# Patient Record
Sex: FEMALE | Race: WHITE | NOT HISPANIC OR LATINO | Employment: OTHER | URBAN - METROPOLITAN AREA
[De-identification: names, ages, dates, MRNs, and addresses within clinical notes are randomized per-mention and may not be internally consistent; named-entity substitution may affect disease eponyms.]

---

## 2017-01-08 ENCOUNTER — HOSPITAL ENCOUNTER (EMERGENCY)
Facility: HOSPITAL | Age: 42
Discharge: HOME | End: 2017-01-08
Attending: EMERGENCY MEDICINE | Admitting: EMERGENCY MEDICINE
Payer: COMMERCIAL

## 2017-01-08 VITALS
TEMPERATURE: 96.5 F | RESPIRATION RATE: 20 BRPM | SYSTOLIC BLOOD PRESSURE: 162 MMHG | DIASTOLIC BLOOD PRESSURE: 90 MMHG | WEIGHT: 197 LBS | OXYGEN SATURATION: 97 % | HEART RATE: 88 BPM | BODY MASS INDEX: 31.8 KG/M2

## 2017-01-08 DIAGNOSIS — H57.12 ACUTE LEFT EYE PAIN: Primary | ICD-10-CM

## 2017-01-08 PROCEDURE — 96372 THER/PROPH/DIAG INJ SC/IM: CPT

## 2017-01-08 PROCEDURE — 99283 EMERGENCY DEPT VISIT LOW MDM: CPT

## 2017-01-08 RX ORDER — ONDANSETRON 4 MG/1
4 TABLET, ORALLY DISINTEGRATING ORAL ONCE
Status: COMPLETED | OUTPATIENT
Start: 2017-01-08 | End: 2017-01-08

## 2017-01-08 RX ORDER — HYDROCODONE BITARTRATE AND ACETAMINOPHEN 5; 325 MG/1; MG/1
1 TABLET ORAL EVERY 6 HOURS PRN
Qty: 4 TABLET | Refills: 0 | Status: ON HOLD | OUTPATIENT
Start: 2017-01-08 | End: 2017-01-11

## 2017-01-08 RX ORDER — PHENYLEPHRINE HCL 2.5 %
1 DROPS OPHTHALMIC (EYE) ONCE
Status: COMPLETED | OUTPATIENT
Start: 2017-01-08 | End: 2017-01-08

## 2017-01-08 RX ORDER — TROPICAMIDE 10 MG/ML
1 SOLUTION/ DROPS OPHTHALMIC ONCE
Status: COMPLETED | OUTPATIENT
Start: 2017-01-08 | End: 2017-01-08

## 2017-01-08 RX ORDER — PROPARACAINE HYDROCHLORIDE 5 MG/ML
2 SOLUTION/ DROPS OPHTHALMIC ONCE
Status: COMPLETED | OUTPATIENT
Start: 2017-01-08 | End: 2017-01-08

## 2017-01-08 RX ORDER — IBUPROFEN 600 MG/1
600 TABLET ORAL ONCE
Status: COMPLETED | OUTPATIENT
Start: 2017-01-08 | End: 2017-01-08

## 2017-01-08 RX ORDER — ONDANSETRON 4 MG/1
4 TABLET, FILM COATED ORAL EVERY 8 HOURS PRN
Qty: 10 TABLET | Refills: 0 | Status: SHIPPED | OUTPATIENT
Start: 2017-01-08 | End: 2018-04-23

## 2017-01-08 RX ADMIN — HYDROMORPHONE HYDROCHLORIDE 1 MG: 1 INJECTION, SOLUTION INTRAMUSCULAR; INTRAVENOUS; SUBCUTANEOUS at 20:58

## 2017-01-08 RX ADMIN — PROPARACAINE HYDROCHLORIDE 2 DROP: 5 SOLUTION/ DROPS OPHTHALMIC at 20:40

## 2017-01-08 RX ADMIN — ONDANSETRON 4 MG: 4 TABLET, ORALLY DISINTEGRATING ORAL at 20:56

## 2017-01-08 RX ADMIN — TROPICAMIDE 1 DROP: 10 SOLUTION/ DROPS OPHTHALMIC at 22:06

## 2017-01-08 RX ADMIN — ONDANSETRON 4 MG: 4 TABLET, ORALLY DISINTEGRATING ORAL at 22:23

## 2017-01-08 RX ADMIN — IBUPROFEN 600 MG: 600 TABLET ORAL at 21:29

## 2017-01-08 RX ADMIN — PHENYLEPHRINE HYDROCHLORIDE 1 DROP: 25 SOLUTION/ DROPS OPHTHALMIC at 22:07

## 2017-01-08 RX ADMIN — FLUORESCEIN SODIUM 1 STRIP: 1 STRIP OPHTHALMIC at 20:40

## 2017-01-09 ENCOUNTER — HOSPITAL ENCOUNTER (INPATIENT)
Facility: HOSPITAL | Age: 42
LOS: 2 days | Discharge: HOME | DRG: 123 | End: 2017-01-11
Attending: EMERGENCY MEDICINE | Admitting: INTERNAL MEDICINE
Payer: COMMERCIAL

## 2017-01-09 DIAGNOSIS — H46.9 OPTIC NEURITIS, LEFT: Primary | ICD-10-CM

## 2017-01-09 DIAGNOSIS — Z09 FOLLOW UP: ICD-10-CM

## 2017-01-09 DIAGNOSIS — G90.521 COMPLEX REGIONAL PAIN SYNDROME OF RIGHT LOWER EXTREMITY: ICD-10-CM

## 2017-01-09 PROBLEM — J45.909 UNCOMPLICATED ASTHMA: Status: ACTIVE | Noted: 2017-01-09

## 2017-01-09 LAB
ANION GAP SERPL CALCULATED.3IONS-SCNC: 9 MMOL/L (ref 4–13)
BASOPHILS # BLD AUTO: 0.02 THOUSANDS/ΜL (ref 0–0.1)
BASOPHILS NFR BLD AUTO: 0 % (ref 0–1)
BUN SERPL-MCNC: 10 MG/DL (ref 5–25)
CALCIUM SERPL-MCNC: 9 MG/DL (ref 8.3–10.1)
CHLORIDE SERPL-SCNC: 104 MMOL/L (ref 100–108)
CO2 SERPL-SCNC: 27 MMOL/L (ref 21–32)
CREAT SERPL-MCNC: 1.04 MG/DL (ref 0.6–1.3)
EOSINOPHIL # BLD AUTO: 0.01 THOUSAND/ΜL (ref 0–0.61)
EOSINOPHIL NFR BLD AUTO: 0 % (ref 0–6)
ERYTHROCYTE [DISTWIDTH] IN BLOOD BY AUTOMATED COUNT: 13.6 % (ref 11.6–15.1)
GFR SERPL CREATININE-BSD FRML MDRD: 58.4 ML/MIN/1.73SQ M
GLUCOSE SERPL-MCNC: 100 MG/DL (ref 65–140)
HCT VFR BLD AUTO: 43 % (ref 34.8–46.1)
HGB BLD-MCNC: 13.9 G/DL (ref 11.5–15.4)
LYMPHOCYTES # BLD AUTO: 1.27 THOUSANDS/ΜL (ref 0.6–4.47)
LYMPHOCYTES NFR BLD AUTO: 17 % (ref 14–44)
MCH RBC QN AUTO: 27.7 PG (ref 26.8–34.3)
MCHC RBC AUTO-ENTMCNC: 32.3 G/DL (ref 31.4–37.4)
MCV RBC AUTO: 86 FL (ref 82–98)
MONOCYTES # BLD AUTO: 0.34 THOUSAND/ΜL (ref 0.17–1.22)
MONOCYTES NFR BLD AUTO: 5 % (ref 4–12)
NEUTROPHILS # BLD AUTO: 5.69 THOUSANDS/ΜL (ref 1.85–7.62)
NEUTS SEG NFR BLD AUTO: 78 % (ref 43–75)
PLATELET # BLD AUTO: 277 THOUSANDS/UL (ref 149–390)
PLATELET # BLD AUTO: 285 THOUSANDS/UL (ref 149–390)
PMV BLD AUTO: 10.9 FL (ref 8.9–12.7)
PMV BLD AUTO: 11 FL (ref 8.9–12.7)
POTASSIUM SERPL-SCNC: 5.2 MMOL/L (ref 3.5–5.3)
RBC # BLD AUTO: 5.01 MILLION/UL (ref 3.81–5.12)
SODIUM SERPL-SCNC: 140 MMOL/L (ref 136–145)
WBC # BLD AUTO: 7.33 THOUSAND/UL (ref 4.31–10.16)

## 2017-01-09 PROCEDURE — 36415 COLL VENOUS BLD VENIPUNCTURE: CPT | Performed by: EMERGENCY MEDICINE

## 2017-01-09 PROCEDURE — 85049 AUTOMATED PLATELET COUNT: CPT | Performed by: INTERNAL MEDICINE

## 2017-01-09 PROCEDURE — 85025 COMPLETE CBC W/AUTO DIFF WBC: CPT | Performed by: EMERGENCY MEDICINE

## 2017-01-09 PROCEDURE — 99284 EMERGENCY DEPT VISIT MOD MDM: CPT

## 2017-01-09 PROCEDURE — 80048 BASIC METABOLIC PNL TOTAL CA: CPT | Performed by: EMERGENCY MEDICINE

## 2017-01-09 RX ORDER — ONDANSETRON 2 MG/ML
4 INJECTION INTRAMUSCULAR; INTRAVENOUS EVERY 6 HOURS PRN
Status: DISCONTINUED | OUTPATIENT
Start: 2017-01-09 | End: 2017-01-11 | Stop reason: HOSPADM

## 2017-01-09 RX ORDER — HYDROCODONE BITARTRATE AND ACETAMINOPHEN 5; 325 MG/1; MG/1
1 TABLET ORAL ONCE
Status: COMPLETED | OUTPATIENT
Start: 2017-01-09 | End: 2017-01-09

## 2017-01-09 RX ORDER — TIZANIDINE 2 MG/1
4 TABLET ORAL 3 TIMES DAILY
Status: DISCONTINUED | OUTPATIENT
Start: 2017-01-09 | End: 2017-01-11 | Stop reason: HOSPADM

## 2017-01-09 RX ORDER — ALBUTEROL SULFATE 90 UG/1
2 AEROSOL, METERED RESPIRATORY (INHALATION) AS NEEDED
Status: DISCONTINUED | OUTPATIENT
Start: 2017-01-09 | End: 2017-01-11 | Stop reason: HOSPADM

## 2017-01-09 RX ORDER — ACETAMINOPHEN 325 MG/1
650 TABLET ORAL EVERY 6 HOURS PRN
Status: DISCONTINUED | OUTPATIENT
Start: 2017-01-09 | End: 2017-01-11 | Stop reason: HOSPADM

## 2017-01-09 RX ORDER — HYDROCODONE BITARTRATE AND ACETAMINOPHEN 5; 325 MG/1; MG/1
1 TABLET ORAL EVERY 6 HOURS PRN
Status: DISCONTINUED | OUTPATIENT
Start: 2017-01-09 | End: 2017-01-11 | Stop reason: HOSPADM

## 2017-01-09 RX ORDER — GABAPENTIN 300 MG/1
600 CAPSULE ORAL 3 TIMES DAILY
Status: DISCONTINUED | OUTPATIENT
Start: 2017-01-09 | End: 2017-01-11 | Stop reason: HOSPADM

## 2017-01-09 RX ORDER — PANTOPRAZOLE SODIUM 40 MG/1
40 TABLET, DELAYED RELEASE ORAL
Status: DISCONTINUED | OUTPATIENT
Start: 2017-01-10 | End: 2017-01-11 | Stop reason: HOSPADM

## 2017-01-09 RX ORDER — TRAMADOL HYDROCHLORIDE 50 MG/1
50 TABLET ORAL EVERY 6 HOURS PRN
Status: DISCONTINUED | OUTPATIENT
Start: 2017-01-09 | End: 2017-01-11 | Stop reason: HOSPADM

## 2017-01-09 RX ADMIN — HYDROMORPHONE HYDROCHLORIDE 0.5 MG: 1 INJECTION, SOLUTION INTRAMUSCULAR; INTRAVENOUS; SUBCUTANEOUS at 20:06

## 2017-01-09 RX ADMIN — GABAPENTIN 600 MG: 300 CAPSULE ORAL at 22:32

## 2017-01-09 RX ADMIN — HYDROCODONE BITARTRATE AND ACETAMINOPHEN 1 TABLET: 5; 325 TABLET ORAL at 11:28

## 2017-01-09 RX ADMIN — TRAMADOL HYDROCHLORIDE 50 MG: 50 TABLET, COATED ORAL at 18:29

## 2017-01-09 RX ADMIN — GABAPENTIN 600 MG: 300 CAPSULE ORAL at 15:24

## 2017-01-09 RX ADMIN — METHYLPREDNISOLONE SODIUM SUCCINATE 1000 MG: 1 INJECTION, POWDER, LYOPHILIZED, FOR SOLUTION INTRAMUSCULAR; INTRAVENOUS at 13:17

## 2017-01-09 RX ADMIN — TIZANIDINE 4 MG: 2 TABLET ORAL at 22:32

## 2017-01-09 RX ADMIN — ONDANSETRON 4 MG: 2 INJECTION INTRAMUSCULAR; INTRAVENOUS at 18:34

## 2017-01-09 RX ADMIN — HYDROCODONE BITARTATE AND ACETAMINOPHEN 1 TABLET: 5; 325 TABLET ORAL at 15:25

## 2017-01-09 RX ADMIN — TIZANIDINE 4 MG: 2 TABLET ORAL at 15:24

## 2017-01-10 LAB
ALBUMIN SERPL BCP-MCNC: 3.7 G/DL (ref 3.5–5)
ALP SERPL-CCNC: 87 U/L (ref 46–116)
ALT SERPL W P-5'-P-CCNC: 21 U/L (ref 12–78)
ANION GAP SERPL CALCULATED.3IONS-SCNC: 12 MMOL/L (ref 4–13)
AST SERPL W P-5'-P-CCNC: 12 U/L (ref 5–45)
BILIRUB SERPL-MCNC: 0.3 MG/DL (ref 0.2–1)
BUN SERPL-MCNC: 10 MG/DL (ref 5–25)
CALCIUM SERPL-MCNC: 8.9 MG/DL (ref 8.3–10.1)
CHLORIDE SERPL-SCNC: 105 MMOL/L (ref 100–108)
CO2 SERPL-SCNC: 23 MMOL/L (ref 21–32)
CREAT SERPL-MCNC: 1 MG/DL (ref 0.6–1.3)
ERYTHROCYTE [DISTWIDTH] IN BLOOD BY AUTOMATED COUNT: 13.2 % (ref 11.6–15.1)
GFR SERPL CREATININE-BSD FRML MDRD: >60 ML/MIN/1.73SQ M
GLUCOSE SERPL-MCNC: 163 MG/DL (ref 65–140)
HCT VFR BLD AUTO: 40.6 % (ref 34.8–46.1)
HGB BLD-MCNC: 13 G/DL (ref 11.5–15.4)
MCH RBC QN AUTO: 27 PG (ref 26.8–34.3)
MCHC RBC AUTO-ENTMCNC: 32 G/DL (ref 31.4–37.4)
MCV RBC AUTO: 84 FL (ref 82–98)
PLATELET # BLD AUTO: 288 THOUSANDS/UL (ref 149–390)
PMV BLD AUTO: 11.3 FL (ref 8.9–12.7)
POTASSIUM SERPL-SCNC: 3.9 MMOL/L (ref 3.5–5.3)
PROT SERPL-MCNC: 7.6 G/DL (ref 6.4–8.2)
RBC # BLD AUTO: 4.81 MILLION/UL (ref 3.81–5.12)
SODIUM SERPL-SCNC: 140 MMOL/L (ref 136–145)
WBC # BLD AUTO: 8.5 THOUSAND/UL (ref 4.31–10.16)

## 2017-01-10 PROCEDURE — 80053 COMPREHEN METABOLIC PANEL: CPT | Performed by: INTERNAL MEDICINE

## 2017-01-10 PROCEDURE — 85027 COMPLETE CBC AUTOMATED: CPT | Performed by: INTERNAL MEDICINE

## 2017-01-10 RX ADMIN — ONDANSETRON 4 MG: 2 INJECTION INTRAMUSCULAR; INTRAVENOUS at 08:52

## 2017-01-10 RX ADMIN — PANTOPRAZOLE SODIUM 40 MG: 40 TABLET, DELAYED RELEASE ORAL at 05:11

## 2017-01-10 RX ADMIN — SODIUM CHLORIDE 1000 MG: 0.9 INJECTION, SOLUTION INTRAVENOUS at 12:30

## 2017-01-10 RX ADMIN — TIZANIDINE 4 MG: 2 TABLET ORAL at 15:46

## 2017-01-10 RX ADMIN — ACETAMINOPHEN 650 MG: 325 TABLET ORAL at 17:20

## 2017-01-10 RX ADMIN — TIZANIDINE 4 MG: 2 TABLET ORAL at 08:52

## 2017-01-10 RX ADMIN — HYDROCODONE BITARTATE AND ACETAMINOPHEN 1 TABLET: 5; 325 TABLET ORAL at 15:27

## 2017-01-10 RX ADMIN — ACETAMINOPHEN 650 MG: 325 TABLET ORAL at 08:51

## 2017-01-10 RX ADMIN — HYDROCODONE BITARTATE AND ACETAMINOPHEN 1 TABLET: 5; 325 TABLET ORAL at 21:12

## 2017-01-10 RX ADMIN — GABAPENTIN 600 MG: 300 CAPSULE ORAL at 21:12

## 2017-01-10 RX ADMIN — TIZANIDINE 4 MG: 2 TABLET ORAL at 21:12

## 2017-01-10 RX ADMIN — HYDROCODONE BITARTATE AND ACETAMINOPHEN 1 TABLET: 5; 325 TABLET ORAL at 05:15

## 2017-01-10 RX ADMIN — GABAPENTIN 600 MG: 300 CAPSULE ORAL at 15:45

## 2017-01-10 RX ADMIN — GABAPENTIN 600 MG: 300 CAPSULE ORAL at 08:52

## 2017-01-10 RX ADMIN — ONDANSETRON 4 MG: 2 INJECTION INTRAMUSCULAR; INTRAVENOUS at 21:12

## 2017-01-11 ENCOUNTER — APPOINTMENT (INPATIENT)
Dept: RADIOLOGY | Facility: HOSPITAL | Age: 42
DRG: 123 | End: 2017-01-11
Payer: COMMERCIAL

## 2017-01-11 ENCOUNTER — APPOINTMENT (INPATIENT)
Dept: MRI IMAGING | Facility: HOSPITAL | Age: 42
DRG: 123 | End: 2017-01-11
Payer: COMMERCIAL

## 2017-01-11 VITALS
RESPIRATION RATE: 18 BRPM | HEART RATE: 93 BPM | BODY MASS INDEX: 30.29 KG/M2 | OXYGEN SATURATION: 100 % | TEMPERATURE: 97.9 F | DIASTOLIC BLOOD PRESSURE: 58 MMHG | HEIGHT: 66 IN | SYSTOLIC BLOOD PRESSURE: 109 MMHG | WEIGHT: 188.49 LBS

## 2017-01-11 LAB — ERYTHROCYTE [SEDIMENTATION RATE] IN BLOOD: 10 MM/HOUR (ref 0–20)

## 2017-01-11 PROCEDURE — 71010 HB CHEST X-RAY 1 VIEW FRONTAL: CPT

## 2017-01-11 PROCEDURE — 86618 LYME DISEASE ANTIBODY: CPT | Performed by: PSYCHIATRY & NEUROLOGY

## 2017-01-11 PROCEDURE — 74000 HB X-RAY EXAM OF ABDOMEN (SINGLE ANTEROPOSTERIOR VIEW): CPT

## 2017-01-11 PROCEDURE — 86617 LYME DISEASE ANTIBODY: CPT | Performed by: PSYCHIATRY & NEUROLOGY

## 2017-01-11 PROCEDURE — 82164 ANGIOTENSIN I ENZYME TEST: CPT | Performed by: PSYCHIATRY & NEUROLOGY

## 2017-01-11 PROCEDURE — 85652 RBC SED RATE AUTOMATED: CPT | Performed by: INTERNAL MEDICINE

## 2017-01-11 PROCEDURE — 70553 MRI BRAIN STEM W/O & W/DYE: CPT

## 2017-01-11 PROCEDURE — 83520 IMMUNOASSAY QUANT NOS NONAB: CPT | Performed by: PSYCHIATRY & NEUROLOGY

## 2017-01-11 PROCEDURE — 70543 MRI ORBT/FAC/NCK W/O &W/DYE: CPT

## 2017-01-11 PROCEDURE — 86592 SYPHILIS TEST NON-TREP QUAL: CPT | Performed by: PSYCHIATRY & NEUROLOGY

## 2017-01-11 PROCEDURE — A9585 GADOBUTROL INJECTION: HCPCS | Performed by: INTERNAL MEDICINE

## 2017-01-11 RX ORDER — HYDROCODONE BITARTRATE AND ACETAMINOPHEN 5; 325 MG/1; MG/1
1 TABLET ORAL EVERY 6 HOURS PRN
Qty: 8 TABLET | Refills: 0 | Status: SHIPPED | OUTPATIENT
Start: 2017-01-11 | End: 2017-01-14

## 2017-01-11 RX ORDER — LORAZEPAM 2 MG/ML
0.5 INJECTION INTRAMUSCULAR EVERY 6 HOURS PRN
Status: DISCONTINUED | OUTPATIENT
Start: 2017-01-11 | End: 2017-01-11 | Stop reason: HOSPADM

## 2017-01-11 RX ORDER — METHYLPREDNISOLONE 4 MG/1
6 TABLET ORAL
Status: DISCONTINUED | OUTPATIENT
Start: 2017-01-20 | End: 2017-01-11 | Stop reason: HOSPADM

## 2017-01-11 RX ORDER — PREDNISONE 10 MG/1
TABLET ORAL
Qty: 33 TABLET | Refills: 0 | Status: SHIPPED | OUTPATIENT
Start: 2017-01-11 | End: 2017-03-18

## 2017-01-11 RX ORDER — METHYLPREDNISOLONE 4 MG/1
10 TABLET ORAL
Status: DISCONTINUED | OUTPATIENT
Start: 2017-01-19 | End: 2017-01-11 | Stop reason: HOSPADM

## 2017-01-11 RX ORDER — OMEPRAZOLE 20 MG/1
20 CAPSULE, DELAYED RELEASE ORAL DAILY
Qty: 30 CAPSULE | Refills: 0 | Status: SHIPPED | OUTPATIENT
Start: 2017-01-11 | End: 2018-04-23

## 2017-01-11 RX ORDER — GABAPENTIN 600 MG/1
600 TABLET ORAL
Qty: 150 TABLET | Refills: 0 | Status: SHIPPED | OUTPATIENT
Start: 2017-01-11 | End: 2017-07-18 | Stop reason: HOSPADM

## 2017-01-11 RX ORDER — TIZANIDINE HYDROCHLORIDE 4 MG/1
4 CAPSULE, GELATIN COATED ORAL 3 TIMES DAILY
Qty: 90 CAPSULE | Refills: 0 | Status: SHIPPED | OUTPATIENT
Start: 2017-01-11 | End: 2018-04-23

## 2017-01-11 RX ADMIN — TIZANIDINE 4 MG: 2 TABLET ORAL at 08:25

## 2017-01-11 RX ADMIN — TIZANIDINE 4 MG: 2 TABLET ORAL at 15:37

## 2017-01-11 RX ADMIN — SODIUM CHLORIDE 1000 MG: 0.9 INJECTION, SOLUTION INTRAVENOUS at 08:25

## 2017-01-11 RX ADMIN — HYDROCODONE BITARTATE AND ACETAMINOPHEN 1 TABLET: 5; 325 TABLET ORAL at 08:25

## 2017-01-11 RX ADMIN — LORAZEPAM 0.5 MG: 2 INJECTION INTRAMUSCULAR; INTRAVENOUS at 11:49

## 2017-01-11 RX ADMIN — PANTOPRAZOLE SODIUM 40 MG: 40 TABLET, DELAYED RELEASE ORAL at 05:46

## 2017-01-11 RX ADMIN — GABAPENTIN 600 MG: 300 CAPSULE ORAL at 15:37

## 2017-01-11 RX ADMIN — ONDANSETRON 4 MG: 2 INJECTION INTRAMUSCULAR; INTRAVENOUS at 04:40

## 2017-01-11 RX ADMIN — TRAMADOL HYDROCHLORIDE 50 MG: 50 TABLET, COATED ORAL at 01:06

## 2017-01-11 RX ADMIN — GABAPENTIN 600 MG: 300 CAPSULE ORAL at 08:25

## 2017-01-11 RX ADMIN — GADOBUTROL 8 ML: 604.72 INJECTION INTRAVENOUS at 12:55

## 2017-01-12 LAB
ACE SERPL-CCNC: 20 U/L (ref 14–82)
B BURGDOR IGG SER IA-ACNC: 0.17
B BURGDOR IGM SER IA-ACNC: 1.16
RPR SER QL: NORMAL

## 2017-01-13 LAB
AQP4 H2O CHANNEL AB SERPL IA-ACNC: <1.5 U/ML (ref 0–3)
B BURGDOR IGG PATRN SER IB-IMP: NEGATIVE
B BURGDOR IGM PATRN SER IB-IMP: NEGATIVE
B BURGDOR18KD IGG SER QL IB: NORMAL
B BURGDOR23KD IGG SER QL IB: NORMAL
B BURGDOR23KD IGM SER QL IB: NORMAL
B BURGDOR28KD IGG SER QL IB: NORMAL
B BURGDOR30KD IGG SER QL IB: NORMAL
B BURGDOR39KD IGG SER QL IB: NORMAL
B BURGDOR39KD IGM SER QL IB: NORMAL
B BURGDOR41KD IGG SER QL IB: NORMAL
B BURGDOR41KD IGM SER QL IB: NORMAL
B BURGDOR45KD IGG SER QL IB: NORMAL
B BURGDOR58KD IGG SER QL IB: NORMAL
B BURGDOR66KD IGG SER QL IB: NORMAL
B BURGDOR93KD IGG SER QL IB: NORMAL

## 2017-01-19 ENCOUNTER — APPOINTMENT (EMERGENCY)
Dept: RADIOLOGY | Facility: HOSPITAL | Age: 42
End: 2017-01-19
Payer: COMMERCIAL

## 2017-01-19 ENCOUNTER — HOSPITAL ENCOUNTER (EMERGENCY)
Facility: HOSPITAL | Age: 42
Discharge: HOME | End: 2017-01-19
Attending: EMERGENCY MEDICINE
Payer: COMMERCIAL

## 2017-01-19 VITALS
DIASTOLIC BLOOD PRESSURE: 77 MMHG | HEIGHT: 66 IN | WEIGHT: 197 LBS | TEMPERATURE: 98.6 F | HEART RATE: 83 BPM | OXYGEN SATURATION: 97 % | RESPIRATION RATE: 18 BRPM | SYSTOLIC BLOOD PRESSURE: 135 MMHG | BODY MASS INDEX: 31.66 KG/M2

## 2017-01-19 DIAGNOSIS — G90.521 COMPLEX REGIONAL PAIN SYNDROME OF RIGHT LOWER EXTREMITY: ICD-10-CM

## 2017-01-19 DIAGNOSIS — R55 SYNCOPE: ICD-10-CM

## 2017-01-19 DIAGNOSIS — M79.661 PAIN IN RIGHT LOWER LEG: Primary | ICD-10-CM

## 2017-01-19 LAB
ATRIAL RATE: 77 BPM
P AXIS: 71 DEGREES
PR INTERVAL: 150 MS
QRS AXIS: 79 DEGREES
QRSD INTERVAL: 74 MS
QT INTERVAL: 362 MS
QTC INTERVAL: 409 MS
T WAVE AXIS: 70 DEGREES
VENTRICULAR RATE: 77 BPM

## 2017-01-19 PROCEDURE — 93005 ELECTROCARDIOGRAM TRACING: CPT | Performed by: EMERGENCY MEDICINE

## 2017-01-19 PROCEDURE — 93971 EXTREMITY STUDY: CPT

## 2017-01-19 PROCEDURE — 99284 EMERGENCY DEPT VISIT MOD MDM: CPT

## 2017-01-19 PROCEDURE — 73590 X-RAY EXAM OF LOWER LEG: CPT

## 2017-03-18 ENCOUNTER — APPOINTMENT (EMERGENCY)
Dept: RADIOLOGY | Facility: HOSPITAL | Age: 42
End: 2017-03-18
Payer: COMMERCIAL

## 2017-03-18 ENCOUNTER — HOSPITAL ENCOUNTER (EMERGENCY)
Facility: HOSPITAL | Age: 42
Discharge: HOME | End: 2017-03-18
Attending: EMERGENCY MEDICINE | Admitting: EMERGENCY MEDICINE
Payer: COMMERCIAL

## 2017-03-18 VITALS
OXYGEN SATURATION: 96 % | WEIGHT: 197 LBS | BODY MASS INDEX: 31.8 KG/M2 | HEART RATE: 74 BPM | TEMPERATURE: 97 F | DIASTOLIC BLOOD PRESSURE: 59 MMHG | SYSTOLIC BLOOD PRESSURE: 96 MMHG | RESPIRATION RATE: 18 BRPM

## 2017-03-18 DIAGNOSIS — G90.511 COMPLEX REGIONAL PAIN SYNDROME I OF RIGHT UPPER LIMB: Primary | ICD-10-CM

## 2017-03-18 PROCEDURE — 96374 THER/PROPH/DIAG INJ IV PUSH: CPT

## 2017-03-18 PROCEDURE — 96375 TX/PRO/DX INJ NEW DRUG ADDON: CPT

## 2017-03-18 PROCEDURE — 73030 X-RAY EXAM OF SHOULDER: CPT

## 2017-03-18 PROCEDURE — 99283 EMERGENCY DEPT VISIT LOW MDM: CPT

## 2017-03-18 RX ORDER — HYDROCODONE BITARTRATE AND ACETAMINOPHEN 5; 325 MG/1; MG/1
1 TABLET ORAL 2 TIMES DAILY
COMMUNITY
End: 2017-06-01

## 2017-03-18 RX ORDER — METOCLOPRAMIDE HYDROCHLORIDE 5 MG/ML
10 INJECTION INTRAMUSCULAR; INTRAVENOUS ONCE
Status: COMPLETED | OUTPATIENT
Start: 2017-03-18 | End: 2017-03-18

## 2017-03-18 RX ORDER — MORPHINE SULFATE 15 MG/1
7.5 TABLET ORAL EVERY 6 HOURS PRN
Qty: 3 TABLET | Refills: 0 | Status: SHIPPED | OUTPATIENT
Start: 2017-03-18 | End: 2017-06-01

## 2017-03-18 RX ORDER — LIDOCAINE 50 MG/G
1 PATCH TOPICAL ONCE
Status: DISCONTINUED | OUTPATIENT
Start: 2017-03-18 | End: 2017-03-18 | Stop reason: HOSPADM

## 2017-03-18 RX ORDER — KETOROLAC TROMETHAMINE 30 MG/ML
15 INJECTION, SOLUTION INTRAMUSCULAR; INTRAVENOUS ONCE
Status: COMPLETED | OUTPATIENT
Start: 2017-03-18 | End: 2017-03-18

## 2017-03-18 RX ORDER — LIDOCAINE 50 MG/G
1 PATCH TOPICAL EVERY 24 HOURS
Qty: 30 PATCH | Refills: 0 | Status: SHIPPED | OUTPATIENT
Start: 2017-03-18 | End: 2018-04-23

## 2017-03-18 RX ORDER — DICLOFENAC POTASSIUM 50 MG/1
50 TABLET, FILM COATED ORAL 2 TIMES DAILY
COMMUNITY
End: 2017-06-01

## 2017-03-18 RX ADMIN — LIDOCAINE 1 PATCH: 50 PATCH CUTANEOUS at 14:57

## 2017-03-18 RX ADMIN — METOCLOPRAMIDE 10 MG: 5 INJECTION, SOLUTION INTRAMUSCULAR; INTRAVENOUS at 16:38

## 2017-03-18 RX ADMIN — KETOROLAC TROMETHAMINE 15 MG: 30 INJECTION, SOLUTION INTRAMUSCULAR at 14:57

## 2017-03-18 RX ADMIN — HYDROMORPHONE HYDROCHLORIDE 1 MG: 1 INJECTION, SOLUTION INTRAMUSCULAR; INTRAVENOUS; SUBCUTANEOUS at 15:45

## 2017-04-13 ENCOUNTER — CONVERSION ENCOUNTER (OUTPATIENT)
Dept: RADIOLOGY | Facility: IMAGING CENTER | Age: 42
End: 2017-04-13

## 2017-04-26 ENCOUNTER — APPOINTMENT (OUTPATIENT)
Dept: OCCUPATIONAL THERAPY | Facility: HOSPITAL | Age: 42
End: 2017-04-26
Payer: MEDICARE

## 2017-04-26 ENCOUNTER — ALLSCRIPTS OFFICE VISIT (OUTPATIENT)
Dept: OTHER | Facility: OTHER | Age: 42
End: 2017-04-26

## 2017-04-26 DIAGNOSIS — M65.341 TRIGGER RING FINGER OF RIGHT HAND: ICD-10-CM

## 2017-04-26 PROCEDURE — L3806 WHFO W/JOINT(S) CUSTOM FAB: HCPCS

## 2017-04-26 PROCEDURE — G8990 OTHER PT/OT CURRENT STATUS: HCPCS

## 2017-04-26 PROCEDURE — G8991 OTHER PT/OT GOAL STATUS: HCPCS

## 2017-05-19 ENCOUNTER — GENERIC CONVERSION - ENCOUNTER (OUTPATIENT)
Dept: OTHER | Facility: OTHER | Age: 42
End: 2017-05-19

## 2017-05-29 ENCOUNTER — APPOINTMENT (EMERGENCY)
Dept: RADIOLOGY | Facility: HOSPITAL | Age: 42
End: 2017-05-29

## 2017-05-29 ENCOUNTER — HOSPITAL ENCOUNTER (EMERGENCY)
Facility: HOSPITAL | Age: 42
Discharge: HOME | End: 2017-05-29
Attending: EMERGENCY MEDICINE | Admitting: EMERGENCY MEDICINE

## 2017-05-29 VITALS
SYSTOLIC BLOOD PRESSURE: 103 MMHG | HEART RATE: 87 BPM | TEMPERATURE: 98.4 F | RESPIRATION RATE: 16 BRPM | WEIGHT: 197 LBS | BODY MASS INDEX: 32.82 KG/M2 | HEIGHT: 65 IN | OXYGEN SATURATION: 98 % | DIASTOLIC BLOOD PRESSURE: 62 MMHG

## 2017-05-29 DIAGNOSIS — R10.9 FLANK PAIN: Primary | ICD-10-CM

## 2017-05-29 LAB
ANION GAP SERPL CALCULATED.3IONS-SCNC: 8 MMOL/L (ref 4–13)
BASOPHILS # BLD AUTO: 0.04 THOUSANDS/ΜL (ref 0–0.1)
BASOPHILS NFR BLD AUTO: 0 % (ref 0–1)
BUN SERPL-MCNC: 10 MG/DL (ref 5–25)
CALCIUM SERPL-MCNC: 8.6 MG/DL (ref 8.3–10.1)
CHLORIDE SERPL-SCNC: 111 MMOL/L (ref 100–108)
CO2 SERPL-SCNC: 23 MMOL/L (ref 21–32)
CREAT SERPL-MCNC: 0.94 MG/DL (ref 0.6–1.3)
EOSINOPHIL # BLD AUTO: 0.02 THOUSAND/ΜL (ref 0–0.61)
EOSINOPHIL NFR BLD AUTO: 0 % (ref 0–6)
ERYTHROCYTE [DISTWIDTH] IN BLOOD BY AUTOMATED COUNT: 13.6 % (ref 11.6–15.1)
GFR SERPL CREATININE-BSD FRML MDRD: >60 ML/MIN/1.73SQ M
GLUCOSE SERPL-MCNC: 111 MG/DL (ref 65–140)
HCT VFR BLD AUTO: 37.8 % (ref 34.8–46.1)
HGB BLD-MCNC: 12.4 G/DL (ref 11.5–15.4)
LYMPHOCYTES # BLD AUTO: 1.84 THOUSANDS/ΜL (ref 0.6–4.47)
LYMPHOCYTES NFR BLD AUTO: 19 % (ref 14–44)
MCH RBC QN AUTO: 28.2 PG (ref 26.8–34.3)
MCHC RBC AUTO-ENTMCNC: 32.8 G/DL (ref 31.4–37.4)
MCV RBC AUTO: 86 FL (ref 82–98)
MONOCYTES # BLD AUTO: 0.62 THOUSAND/ΜL (ref 0.17–1.22)
MONOCYTES NFR BLD AUTO: 6 % (ref 4–12)
NEUTROPHILS # BLD AUTO: 7.23 THOUSANDS/ΜL (ref 1.85–7.62)
NEUTS SEG NFR BLD AUTO: 75 % (ref 43–75)
NRBC BLD AUTO-RTO: 0 /100 WBCS
PLATELET # BLD AUTO: 279 THOUSANDS/UL (ref 149–390)
PMV BLD AUTO: 10.3 FL (ref 8.9–12.7)
POTASSIUM SERPL-SCNC: 3.9 MMOL/L (ref 3.5–5.3)
RBC # BLD AUTO: 4.4 MILLION/UL (ref 3.81–5.12)
SODIUM SERPL-SCNC: 142 MMOL/L (ref 136–145)
WBC # BLD AUTO: 9.8 THOUSAND/UL (ref 4.31–10.16)

## 2017-05-29 PROCEDURE — 85025 COMPLETE CBC W/AUTO DIFF WBC: CPT | Performed by: EMERGENCY MEDICINE

## 2017-05-29 PROCEDURE — 99284 EMERGENCY DEPT VISIT MOD MDM: CPT

## 2017-05-29 PROCEDURE — 74176 CT ABD & PELVIS W/O CONTRAST: CPT

## 2017-05-29 PROCEDURE — 96374 THER/PROPH/DIAG INJ IV PUSH: CPT

## 2017-05-29 PROCEDURE — 80048 BASIC METABOLIC PNL TOTAL CA: CPT | Performed by: EMERGENCY MEDICINE

## 2017-05-29 PROCEDURE — 96375 TX/PRO/DX INJ NEW DRUG ADDON: CPT

## 2017-05-29 PROCEDURE — 96361 HYDRATE IV INFUSION ADD-ON: CPT

## 2017-05-29 PROCEDURE — 36415 COLL VENOUS BLD VENIPUNCTURE: CPT | Performed by: EMERGENCY MEDICINE

## 2017-05-29 RX ORDER — KETOROLAC TROMETHAMINE 30 MG/ML
15 INJECTION, SOLUTION INTRAMUSCULAR; INTRAVENOUS ONCE
Status: COMPLETED | OUTPATIENT
Start: 2017-05-29 | End: 2017-05-29

## 2017-05-29 RX ORDER — NAPROXEN 500 MG/1
500 TABLET ORAL 2 TIMES DAILY WITH MEALS
Qty: 14 TABLET | Refills: 0 | Status: SHIPPED | OUTPATIENT
Start: 2017-05-29 | End: 2017-06-01

## 2017-05-29 RX ADMIN — HYDROMORPHONE HYDROCHLORIDE 0.5 MG: 1 INJECTION, SOLUTION INTRAMUSCULAR; INTRAVENOUS; SUBCUTANEOUS at 18:41

## 2017-05-29 RX ADMIN — KETOROLAC TROMETHAMINE 15 MG: 30 INJECTION, SOLUTION INTRAMUSCULAR at 18:41

## 2017-05-29 RX ADMIN — SODIUM CHLORIDE 1000 ML: 0.9 INJECTION, SOLUTION INTRAVENOUS at 18:38

## 2017-06-22 ENCOUNTER — GENERIC CONVERSION - ENCOUNTER (OUTPATIENT)
Dept: OTHER | Facility: OTHER | Age: 42
End: 2017-06-22

## 2017-06-28 ENCOUNTER — ALLSCRIPTS OFFICE VISIT (OUTPATIENT)
Dept: OTHER | Facility: OTHER | Age: 42
End: 2017-06-28

## 2017-07-11 ENCOUNTER — GENERIC CONVERSION - ENCOUNTER (OUTPATIENT)
Dept: OTHER | Facility: OTHER | Age: 42
End: 2017-07-11

## 2017-07-11 RX ORDER — DULOXETIN HYDROCHLORIDE 30 MG/1
30 CAPSULE, DELAYED RELEASE ORAL 2 TIMES DAILY
COMMUNITY

## 2017-07-11 RX ORDER — BACLOFEN 10 MG/1
10 TABLET ORAL 4 TIMES DAILY
COMMUNITY

## 2017-07-17 ENCOUNTER — GENERIC CONVERSION - ENCOUNTER (OUTPATIENT)
Dept: OTHER | Facility: OTHER | Age: 42
End: 2017-07-17

## 2017-07-18 ENCOUNTER — GENERIC CONVERSION - ENCOUNTER (OUTPATIENT)
Dept: PERIOP | Facility: HOSPITAL | Age: 42
End: 2017-07-18

## 2017-07-18 ENCOUNTER — ANESTHESIA EVENT (OUTPATIENT)
Dept: PERIOP | Facility: HOSPITAL | Age: 42
End: 2017-07-18
Payer: MEDICARE

## 2017-07-18 ENCOUNTER — ANESTHESIA (OUTPATIENT)
Dept: PERIOP | Facility: HOSPITAL | Age: 42
End: 2017-07-18
Payer: MEDICARE

## 2017-07-18 ENCOUNTER — HOSPITAL ENCOUNTER (OUTPATIENT)
Facility: HOSPITAL | Age: 42
Setting detail: OUTPATIENT SURGERY
Discharge: HOME | End: 2017-07-18
Attending: NEUROLOGICAL SURGERY | Admitting: NEUROLOGICAL SURGERY
Payer: MEDICARE

## 2017-07-18 VITALS
RESPIRATION RATE: 18 BRPM | TEMPERATURE: 99.1 F | HEART RATE: 84 BPM | BODY MASS INDEX: 32.82 KG/M2 | SYSTOLIC BLOOD PRESSURE: 134 MMHG | HEIGHT: 65 IN | DIASTOLIC BLOOD PRESSURE: 74 MMHG | WEIGHT: 197 LBS | OXYGEN SATURATION: 97 %

## 2017-07-18 PROBLEM — G89.4 CHRONIC PAIN SYNDROME: Status: ACTIVE | Noted: 2017-01-09

## 2017-07-18 PROBLEM — G90.50 COMPLEX REGIONAL PAIN SYNDROME I: Status: ACTIVE | Noted: 2017-07-18

## 2017-07-18 LAB
ABO GROUP BLD: NORMAL
BLD GP AB SCN SERPL QL: NEGATIVE
RH BLD: POSITIVE
SPECIMEN EXPIRATION DATE: NORMAL

## 2017-07-18 PROCEDURE — C1787 PATIENT PROGR, NEUROSTIM: HCPCS | Performed by: NEUROLOGICAL SURGERY

## 2017-07-18 PROCEDURE — 86901 BLOOD TYPING SEROLOGIC RH(D): CPT | Performed by: NEUROLOGICAL SURGERY

## 2017-07-18 PROCEDURE — 86850 RBC ANTIBODY SCREEN: CPT | Performed by: NEUROLOGICAL SURGERY

## 2017-07-18 PROCEDURE — 86900 BLOOD TYPING SEROLOGIC ABO: CPT | Performed by: NEUROLOGICAL SURGERY

## 2017-07-18 PROCEDURE — C1820 GENERATOR NEURO RECHG BAT SY: HCPCS | Performed by: NEUROLOGICAL SURGERY

## 2017-07-18 DEVICE — NEUROSTIM RESTORESENSOR SURESCAN MRI RCHRG: Type: IMPLANTABLE DEVICE | Site: BUTTOCKS | Status: FUNCTIONAL

## 2017-07-18 DEVICE — ACCESSORY KIT NEUROSTIM OCTOPOLAR IN LINE PLUG: Type: IMPLANTABLE DEVICE | Site: BUTTOCKS | Status: FUNCTIONAL

## 2017-07-18 RX ORDER — SCOLOPAMINE TRANSDERMAL SYSTEM 1 MG/1
1 PATCH, EXTENDED RELEASE TRANSDERMAL
Status: DISCONTINUED | OUTPATIENT
Start: 2017-07-18 | End: 2017-07-18 | Stop reason: HOSPADM

## 2017-07-18 RX ORDER — HYDROCODONE BITARTRATE AND ACETAMINOPHEN 5; 325 MG/1; MG/1
2 TABLET ORAL EVERY 4 HOURS PRN
Status: DISCONTINUED | OUTPATIENT
Start: 2017-07-18 | End: 2017-07-18 | Stop reason: HOSPADM

## 2017-07-18 RX ORDER — HYDROCODONE BITARTRATE AND ACETAMINOPHEN 5; 325 MG/1; MG/1
1 TABLET ORAL EVERY 6 HOURS PRN
Status: DISCONTINUED | OUTPATIENT
Start: 2017-07-18 | End: 2017-07-18 | Stop reason: HOSPADM

## 2017-07-18 RX ORDER — HYDROCODONE BITARTRATE AND ACETAMINOPHEN 5; 325 MG/1; MG/1
1-2 TABLET ORAL EVERY 4 HOURS PRN
Qty: 20 TABLET | Refills: 0 | Status: SHIPPED | OUTPATIENT
Start: 2017-07-18 | End: 2017-07-28

## 2017-07-18 RX ORDER — FENTANYL CITRATE 50 UG/ML
INJECTION, SOLUTION INTRAMUSCULAR; INTRAVENOUS AS NEEDED
Status: DISCONTINUED | OUTPATIENT
Start: 2017-07-18 | End: 2017-07-18 | Stop reason: SURG

## 2017-07-18 RX ORDER — MIDAZOLAM HYDROCHLORIDE 1 MG/ML
INJECTION INTRAMUSCULAR; INTRAVENOUS AS NEEDED
Status: DISCONTINUED | OUTPATIENT
Start: 2017-07-18 | End: 2017-07-18 | Stop reason: SURG

## 2017-07-18 RX ORDER — FENTANYL CITRATE/PF 50 MCG/ML
25 SYRINGE (ML) INJECTION
Status: DISCONTINUED | OUTPATIENT
Start: 2017-07-18 | End: 2017-07-18 | Stop reason: HOSPADM

## 2017-07-18 RX ORDER — SODIUM CHLORIDE, SODIUM LACTATE, POTASSIUM CHLORIDE, CALCIUM CHLORIDE 600; 310; 30; 20 MG/100ML; MG/100ML; MG/100ML; MG/100ML
20 INJECTION, SOLUTION INTRAVENOUS CONTINUOUS
Status: DISCONTINUED | OUTPATIENT
Start: 2017-07-18 | End: 2017-07-18 | Stop reason: HOSPADM

## 2017-07-18 RX ORDER — METOCLOPRAMIDE HYDROCHLORIDE 5 MG/ML
10 INJECTION INTRAMUSCULAR; INTRAVENOUS ONCE AS NEEDED
Status: DISCONTINUED | OUTPATIENT
Start: 2017-07-18 | End: 2017-07-18 | Stop reason: HOSPADM

## 2017-07-18 RX ORDER — PROPOFOL 10 MG/ML
INJECTION, EMULSION INTRAVENOUS AS NEEDED
Status: DISCONTINUED | OUTPATIENT
Start: 2017-07-18 | End: 2017-07-18 | Stop reason: SURG

## 2017-07-18 RX ORDER — VANCOMYCIN HYDROCHLORIDE 1 G/200ML
1000 INJECTION, SOLUTION INTRAVENOUS
Status: COMPLETED | OUTPATIENT
Start: 2017-07-18 | End: 2017-07-18

## 2017-07-18 RX ORDER — ONDANSETRON 2 MG/ML
4 INJECTION INTRAMUSCULAR; INTRAVENOUS EVERY 6 HOURS PRN
Status: DISCONTINUED | OUTPATIENT
Start: 2017-07-18 | End: 2017-07-18 | Stop reason: HOSPADM

## 2017-07-18 RX ORDER — LANSOPRAZOLE 30 MG/1
30 CAPSULE, DELAYED RELEASE ORAL DAILY
COMMUNITY
End: 2018-03-28

## 2017-07-18 RX ORDER — ONDANSETRON 2 MG/ML
INJECTION INTRAMUSCULAR; INTRAVENOUS AS NEEDED
Status: DISCONTINUED | OUTPATIENT
Start: 2017-07-18 | End: 2017-07-18 | Stop reason: SURG

## 2017-07-18 RX ADMIN — HYDROCODONE BITARTRATE AND ACETAMINOPHEN 1 TABLET: 5; 325 TABLET ORAL at 14:48

## 2017-07-18 RX ADMIN — MIDAZOLAM HYDROCHLORIDE 2 MG: 1 INJECTION, SOLUTION INTRAMUSCULAR; INTRAVENOUS at 12:49

## 2017-07-18 RX ADMIN — FENTANYL CITRATE 100 MCG: 50 INJECTION, SOLUTION INTRAMUSCULAR; INTRAVENOUS at 12:49

## 2017-07-18 RX ADMIN — SCOPALAMINE 1 PATCH: 1 PATCH, EXTENDED RELEASE TRANSDERMAL at 12:09

## 2017-07-18 RX ADMIN — VANCOMYCIN HYDROCHLORIDE 1000 MG: 1 INJECTION, SOLUTION INTRAVENOUS at 12:01

## 2017-07-18 RX ADMIN — PROPOFOL 150 MG: 10 INJECTION, EMULSION INTRAVENOUS at 12:54

## 2017-07-18 RX ADMIN — FENTANYL CITRATE 25 MCG: 50 INJECTION, SOLUTION INTRAMUSCULAR; INTRAVENOUS at 14:33

## 2017-07-18 RX ADMIN — ONDANSETRON 4 MG: 2 INJECTION INTRAMUSCULAR; INTRAVENOUS at 13:14

## 2017-07-18 RX ADMIN — SODIUM CHLORIDE, SODIUM LACTATE, POTASSIUM CHLORIDE, AND CALCIUM CHLORIDE 20 ML/HR: .6; .31; .03; .02 INJECTION, SOLUTION INTRAVENOUS at 11:08

## 2017-07-24 ENCOUNTER — GENERIC CONVERSION - ENCOUNTER (OUTPATIENT)
Dept: OTHER | Facility: OTHER | Age: 42
End: 2017-07-24

## 2017-08-02 ENCOUNTER — ALLSCRIPTS OFFICE VISIT (OUTPATIENT)
Dept: OTHER | Facility: OTHER | Age: 42
End: 2017-08-02

## 2017-10-16 ENCOUNTER — CONVERSION ENCOUNTER (OUTPATIENT)
Dept: MAMMOGRAPHY | Facility: CLINIC | Age: 42
End: 2017-10-16

## 2017-11-14 ENCOUNTER — CONVERSION ENCOUNTER (OUTPATIENT)
Dept: RADIOLOGY | Facility: IMAGING CENTER | Age: 42
End: 2017-11-14

## 2017-11-29 PROCEDURE — G0145 SCR C/V CYTO,THINLAYER,RESCR: HCPCS | Performed by: FAMILY MEDICINE

## 2017-12-04 ENCOUNTER — LAB REQUISITION (OUTPATIENT)
Dept: LAB | Facility: HOSPITAL | Age: 42
End: 2017-12-04
Payer: MEDICARE

## 2017-12-04 DIAGNOSIS — Z01.419 ENCOUNTER FOR GYNECOLOGICAL EXAMINATION WITHOUT ABNORMAL FINDING: ICD-10-CM

## 2017-12-07 LAB
LAB AP GYN PRIMARY INTERPRETATION: NORMAL
LAB AP LMP: NORMAL
Lab: NORMAL

## 2017-12-26 ENCOUNTER — TRANSCRIBE ORDERS (OUTPATIENT)
Dept: URGENT CARE | Age: 42
End: 2017-12-26

## 2017-12-26 ENCOUNTER — APPOINTMENT (OUTPATIENT)
Dept: URGENT CARE | Age: 42
End: 2017-12-26
Payer: COMMERCIAL

## 2017-12-26 ENCOUNTER — GENERIC CONVERSION - ENCOUNTER (OUTPATIENT)
Dept: OTHER | Facility: OTHER | Age: 42
End: 2017-12-26

## 2017-12-26 ENCOUNTER — APPOINTMENT (OUTPATIENT)
Dept: RADIOLOGY | Age: 42
End: 2017-12-26
Payer: COMMERCIAL

## 2017-12-26 DIAGNOSIS — T14.90XA INJURY: Primary | ICD-10-CM

## 2017-12-26 DIAGNOSIS — T14.90XA INJURY: ICD-10-CM

## 2017-12-26 PROCEDURE — 73130 X-RAY EXAM OF HAND: CPT

## 2017-12-26 PROCEDURE — S9083 URGENT CARE CENTER GLOBAL: HCPCS

## 2017-12-26 PROCEDURE — 99203 OFFICE O/P NEW LOW 30 MIN: CPT

## 2017-12-27 ENCOUNTER — ALLSCRIPTS OFFICE VISIT (OUTPATIENT)
Dept: OTHER | Facility: OTHER | Age: 42
End: 2017-12-27

## 2017-12-28 NOTE — PROGRESS NOTES
Assessment   1  Pain of finger of left hand (729 5) (H31 855)    Plan   Pain of finger of left hand    · * MRI HAND LEFT WO CONTRAST; Status:Need Information - Financial Authorization; Requested for:[id number];     Discussion/Summary      Left middle finger injury MRI I will be ordered and may be suspicion for intrinsic muscle injury given her amount of pain it is hard to distinguish  History of Present Illness   HPI: Patient comes in today with regards to her left middle finger  She was injured at work when she was trying to catch frames from falling  It caused her fingers to be position in a hyper extension position  She had immediate pain and a popping sensation  Pain has been 9/10 since it is a dull stabbing numb tingling pain  It is at the metacarpophalangeal joint region  Of note she does have complex regional pain syndrome on the other hand  She has pain with flexion she went to [place] [address] yesterday where she had x-rays x-rays were negative she is placed in a splint and prescribed prednisone  Review of Systems        Constitutional: No fever, no chills, feels well, no tiredness, no recent weight gain or loss  Eyes: No complaints of eyesight problems, no red eyes  ENT: no loss of hearing, no nosebleeds, no sore throat  Cardiovascular: No complaints of chest pain, no palpitations, no leg claudication or lower extremity edema  Respiratory: no compliants of shortness of breath, no wheezing, no cough  Gastrointestinal: no complaints of abdominal pain, no constipation, no nausea or diarrhea, no vomiting, no bloody stools  Genitourinary: no complaints of dysuria, no incontinence  Musculoskeletal: as noted in HPI  Integumentary: no complaints of skin rash or lesion, no itching or dry skin, no skin wounds  Neurological: no complaints of headache, no confusion, no numbness or tingling, no dizziness        Endocrine: No complaints of muscle weakness, no feelings of weakness, no frequent urination, no excessive thirst       Psychiatric: No suicidal thoughts, no anxiety, no feelings of depression  ROS reviewed  Active Problems   1  Asthma (493 90) (J45 909)   2  Battery end of life of spinal cord stimulator (V53 02) (Z46 2)   3  Chronic pain syndrome (338 4) (G89 4)   4  Encounter for postoperative care (V58 49) (Z48 89)   5  Mononeuritis of upper limb, 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)   6  Post-operative state (V45 89) (Z98 890)   7  RSD (reflex sympathetic dystrophy) (337 20) (G90 50)   8  Trigger ring finger of right hand (727 03) (M65 341)   9  Vitamin B12 deficiency (266 2) (E53 8)   10  Vitamin D deficiency (268 9) (E55 9)    Past Medical History    · History of Chronic Cough     The active problems and past medical history were reviewed and updated today  Surgical History    · History of Tubal Ligation   · History of Tubal Ligation During  Section     The surgical history was reviewed and updated today  Family History   Father    · Family history of Heart Disease (V17 49)     The family history was reviewed and updated today  Social History    · Being A Social Drinker   · History of Current Every Day Smoker (305 1)   · Denied: History of Drug Use   · Former smoker (V15 82) (F24 371)  The social history was reviewed and updated today  Current Meds    1  Baclofen 10 MG Oral Tablet; Therapy: 04XRG6149 to Recorded   2  Cymbalta 30 MG Oral Capsule Delayed Release Particles (DULoxetine HCl); Therapy: 84BPD1770 to Recorded   3  EpiPen 0 3 MG/0 3ML OSIRIS; Therapy: 49TJZ7484 to (Last KK:98DQI4970)  Requested for: 15ENY4385 Ordered   4  Gabapentin 600 MG Oral Tablet; Therapy: 61UTF8228 to Recorded   5  Ibuprofen 800 MG Oral Tablet; Therapy: 54RLY4570 to Recorded   6  Prevacid 30 MG Oral Capsule Delayed Release (Lansoprazole); TAKE 1 CAPSULE     TWICE DAILY;      Therapy: 49KBN9310 to Recorded     The medication list was reviewed and updated today  Allergies   1  Codeine Sulfate TABS   2  Demerol SOLN   3  Iodine Crystals   4  Latex Gloves MISC   5  Penicillins   6  Percocet TABS   7  Shellfish-derived Products  8  Bee sting    Vitals    Recorded: 34VJM6630 10:06AM   Heart Rate 85   Systolic 596   Diastolic 92   Height 5 ft 5 in   Weight 196 lb 2 0 oz   BMI Calculated 32 64   BSA Calculated 1 96     Physical Exam        Constitutional - General appearance: Normal       Musculoskeletal - Upper extremity compartments: Normal       Cardiovascular - Pulses: Normal       Lymphatic - Palpation of lymph nodes in other areas: Normal       Skin - Skin and subcutaneous tissue: Normal       Neurologic - Sensation: Normal -- Upper extremity peripheral neuro exam: Normal       Psychiatric - Orientation to person, place, and time: Normal -- Mood and affect: Normal       Eyes      Conjunctiva and lids: Normal        Results/Data   * XR HAND 3+ VIEW LEFT 57Etu2998 11:04AM [facility]      Test Name Result Flag Reference   XR HAND 3+ VW LEFT (Report)     LEFT HAND           INDICATION: T14 90XA: Injury, unspecified, initial encounter  History taken directly from the electronic ordering system  Injury 3rd MCP joint            COMPARISON: None           VIEWS: 3           IMAGES: 3           For the purposes of institution wide universal language the following terms will apply: (thumb=1st digit/finger, index finger=2nd digit/finger, long finger=3rd digit/finger, ring=4th digit/finger and small finger=5th digit/finger)           FINDINGS:           There is no acute fracture or dislocation  No degenerative changes  No lytic or blastic lesions are seen  Soft tissues are unremarkable  IMPRESSION:           No acute osseous abnormality                  Workstation performed: [id number]           Signed by:      Jen Will MD      12/26/17      I personally reviewed the films/images/results in the office today  My interpretation follows  X-ray Review No osseous deformity  Future Appointments      Date/Time Provider Specialty Site   02/15/2018 08:00 AM JUSTA Tsang   Neurosurgery [facility] NEUROSURGICAL [facility]     Signatures    Electronically signed by : Chris Singh DO; Dec 27 2017 10:45AM EST                       (Author)

## 2018-01-03 ENCOUNTER — TRANSCRIBE ORDERS (OUTPATIENT)
Dept: ADMINISTRATIVE | Facility: HOSPITAL | Age: 43
End: 2018-01-03

## 2018-01-03 DIAGNOSIS — S69.92XA INJURY OF LEFT HAND, INITIAL ENCOUNTER: ICD-10-CM

## 2018-01-03 DIAGNOSIS — R52 PAIN: Primary | ICD-10-CM

## 2018-01-03 LAB
ALBUMIN SERPL-MCNC: 4.5 G/DL (ref 3.6–5.1)
ALBUMIN/GLOB SERPL: 1.5 (CALC) (ref 1–2.5)
ALP SERPL-CCNC: 97 U/L (ref 33–115)
ALT SERPL-CCNC: 16 U/L (ref 6–29)
APPEARANCE UR: CLEAR
AST SERPL-CCNC: 15 U/L (ref 10–30)
BASOPHILS # BLD AUTO: 76 CELLS/UL (ref 0–200)
BASOPHILS NFR BLD AUTO: 0.6 %
BILIRUB SERPL-MCNC: 0.4 MG/DL (ref 0.2–1.2)
BILIRUB UR QL STRIP: NEGATIVE
BUN SERPL-MCNC: 16 MG/DL (ref 7–25)
BUN/CREAT SERPL: NORMAL (CALC) (ref 6–22)
CALCIUM SERPL-MCNC: 10 MG/DL (ref 8.6–10.2)
CHLORIDE SERPL-SCNC: 103 MMOL/L (ref 98–110)
CHOLEST SERPL-MCNC: 220 MG/DL
CHOLEST/HDLC SERPL: 2.8 (CALC)
CO2 SERPL-SCNC: 23 MMOL/L (ref 20–31)
COLOR UR: YELLOW
CREAT SERPL-MCNC: 1.09 MG/DL (ref 0.5–1.1)
EOSINOPHIL # BLD AUTO: 140 CELLS/UL (ref 15–500)
EOSINOPHIL NFR BLD AUTO: 1.1 %
ERYTHROCYTE [DISTWIDTH] IN BLOOD BY AUTOMATED COUNT: 13.4 % (ref 11–15)
GLOBULIN SER CALC-MCNC: 3.1 G/DL (CALC) (ref 1.9–3.7)
GLUCOSE SERPL-MCNC: 94 MG/DL (ref 65–99)
GLUCOSE UR QL STRIP: NEGATIVE
HCT VFR BLD AUTO: 42 % (ref 35–45)
HDLC SERPL-MCNC: 80 MG/DL
HGB BLD-MCNC: 13.8 G/DL (ref 11.7–15.5)
HGB UR QL STRIP: NEGATIVE
KETONES UR QL STRIP: NEGATIVE
LDLC SERPL CALC-MCNC: 105 MG/DL (CALC)
LEUKOCYTE ESTERASE UR QL STRIP: NEGATIVE
LYMPHOCYTES # BLD AUTO: 3035 CELLS/UL (ref 850–3900)
LYMPHOCYTES NFR BLD AUTO: 23.9 %
MAGNESIUM SERPL-MCNC: 2.3 MG/DL (ref 1.5–2.5)
MCH RBC QN AUTO: 27.4 PG (ref 27–33)
MCHC RBC AUTO-ENTMCNC: 32.9 G/DL (ref 32–36)
MCV RBC AUTO: 83.5 FL (ref 80–100)
MONOCYTES # BLD AUTO: 826 CELLS/UL (ref 200–950)
MONOCYTES NFR BLD AUTO: 6.5 %
NEUTROPHILS # BLD AUTO: 8623 CELLS/UL (ref 1500–7800)
NEUTROPHILS NFR BLD AUTO: 67.9 %
NITRITE UR QL STRIP: NEGATIVE
NONHDLC SERPL-MCNC: 140 MG/DL (CALC)
PH UR STRIP: 6.5 [PH] (ref 5–8)
PLATELET # BLD AUTO: 320 THOUSAND/UL (ref 140–400)
PMV BLD REES-ECKER: 12 FL (ref 7.5–12.5)
POTASSIUM SERPL-SCNC: 4.6 MMOL/L (ref 3.5–5.3)
PROT SERPL-MCNC: 7.6 G/DL (ref 6.1–8.1)
PROT UR QL STRIP: NEGATIVE
RBC # BLD AUTO: 5.03 MILLION/UL (ref 3.8–5.1)
SL AMB EGFR AFRICAN AMERICAN: 73 ML/MIN/1.73M2
SL AMB EGFR NON AFRICAN AMERICAN: 63 ML/MIN/1.73M2
SODIUM SERPL-SCNC: 140 MMOL/L (ref 135–146)
SP GR UR STRIP: 1.02 (ref 1–1.03)
TRIGL SERPL-MCNC: 228 MG/DL
TSH SERPL-ACNC: 0.93 MIU/L
WBC # BLD AUTO: 12.7 THOUSAND/UL (ref 3.8–10.8)

## 2018-01-11 NOTE — MISCELLANEOUS
Message  Pt reports she is noticing green on her incision  Questioned her if she is having drainage, and she reports very little  She reports no other s/s of infection with the exception of the area being tender  This is her new IPG insertion site  Pt sent pictures that were reviewed by Jana Ying  Incision looks great with minimal redness, and drainage mentioned, appears to be more like a serous drainage that has dried on the staples  Pt notified and instructed to clean to area well with soap and water and pat dry  She was encouraged to call with any future concerns        Signatures   Electronically signed by : Pili Louise, ; Jul 24 2017  2:02PM EST                       (Author)

## 2018-01-11 NOTE — MISCELLANEOUS
Message   Recorded as Task   Date: 02/04/2016 11:11 AM, Created By: Marti Max   Task Name: Miscellaneous   Assigned To: Edin Kidd   Regarding Patient: Lamar Luke, Status: In Progress   Comment:    Alexandra Rodriguez - 04 Feb 2016 11:11 AM     TASK CREATED  Caller: Self; Other; [phone] (Home); [phone] (Work)  Patient called, would like a call back  Interested in education on stim  Renate Moore - 05 Feb 2016 10:37 AM     TASK EDITED  Pt is not a pt here at [facility]  I believe she would need to have a consult with one of our providers  Tiburcio Yost - 05 Feb 2016 11:09 AM     TASK IN PROGRESS   Andreia Busby - 05 Feb 2016 11:14 AM     TASK REASSIGNED: Previously Assigned To CARLTON Koo - 05 Feb 2016 11:18 AM     TASK EDITED  Spoke with pt and she is signed up for the 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)   3  Vitamin B12 deficiency (266 2) (E53 8)   4  Vitamin D deficiency (268 9) (E55 9)    Current Meds   1  Benzonatate 100 MG Oral Capsule; Therapy: 82Rma7899 to (Last Rx:71Hkd6372)  Requested for: 05Nme5905 Ordered   2  EpiPen 0 3 MG/0 3ML OSIRIS; Therapy: 62VSQ8507 to (Last JY:07OWX3475)  Requested for: 11QYL2471 Ordered   3  Fluticasone Propionate 50 MCG/ACT Nasal Suspension; Therapy: 16Vtz0394 to (Last Rx:01Wjm3686)  Requested for: 12Bdn7907 Ordered   4  Ketoconazole 200 MG Oral Tablet; Therapy: 57SHP1590 to (Last Artist Itzel)  Requested for: 09Vis2829 Ordered   5  Oxistat 1 % External Cream;   Therapy: 11Akd4846 to (Last Rx:81Dll3077)  Requested for: 40RPB6862 Ordered   6  PredniSONE 10 MG Oral Tablet; Therapy: 87USX7322 to (Last Rx:08Jun2011)  Requested for: 31ZVT7361 Ordered   7  Sulfamethoxazole-TMP -160 MG TABS;    Therapy: 25TWV7057 to (Last IE:24MWK2606) Requested for: 67QYU2203 Ordered    Allergies    1  Codeine Sulfate TABS   2  Demerol SOLN   3  Iodine Crystals   4  Latex Gloves MISC   5  Penicillins   6  Percocet TABS    7   Bee sting    Signatures   Electronically signed by : Sakshi Johnson, ; Feb 5 2016 11:19AM EST                       (Author)

## 2018-01-12 NOTE — MISCELLANEOUS
Message  S/w pt on telephone POD 2  She reports that she is doing very well overall and she denies any incisional issues or fevers  She has some incisional pain, which she expected  She is slowly increasing the time in between her pain medication doses  She is very happy overall and wanted me to tell Dr Dragan Matias that "he made her year"  Verified date/time/location of her upcoming POV and advised her to call the office with any further questions or concerns, or if any incisional issues or fevers would arise  Went over incision care with patient and she had no further questions at this time  She was appreciative for the call        Signatures   Electronically signed by : Kiki Cameron RN; Jul 20 2017  1:37PM EST                       (Author)

## 2018-01-12 NOTE — MISCELLANEOUS
Message   Recorded as Task   Date: 07/17/2017 11:53 AM, Created By: Yudelka Bonner   Task Name: Care Coordination   Assigned To: Yudelka Bonner   Regarding Patient: Leo Tim, Status: Active   CommentLevonnaden Racer - 17 Jul 2017 11:53 AM     TASK CREATED  LMOM for patient to call back to go over pre-op instructions  Yudelka Bonner - 17 Jul 2017 1:39 PM     TASK EDITED  S/w patient that is scheduled for surgery tomorrow 7/18/17  Verified allergies and went over pre-op instructions, including bathing and NPO status  Patient currently denies taking any blood thinning medications  Answered any questions she may have had at this time  Confirmed pharmacy on file and sent over post-op antibiotic and explained to patient when to start taking it  She was appreciative  Due to patient's percocet allergy, OK to prescribe morphine sulfate per Dr [facility]  Patient has taken this in the past without reaction          Signatures   Electronically signed by : Marlys Dewitt RN; Jul 17 2017  1:40PM EST                       (Author)

## 2018-01-13 VITALS
DIASTOLIC BLOOD PRESSURE: 84 MMHG | SYSTOLIC BLOOD PRESSURE: 121 MMHG | HEART RATE: 109 BPM | BODY MASS INDEX: 32.51 KG/M2 | HEIGHT: 65 IN | WEIGHT: 195.13 LBS

## 2018-01-13 VITALS
HEART RATE: 93 BPM | BODY MASS INDEX: 31.65 KG/M2 | HEIGHT: 65 IN | WEIGHT: 190 LBS | DIASTOLIC BLOOD PRESSURE: 87 MMHG | SYSTOLIC BLOOD PRESSURE: 116 MMHG

## 2018-01-14 VITALS
RESPIRATION RATE: 96 BRPM | HEIGHT: 65 IN | SYSTOLIC BLOOD PRESSURE: 118 MMHG | DIASTOLIC BLOOD PRESSURE: 81 MMHG | WEIGHT: 196.13 LBS | BODY MASS INDEX: 32.68 KG/M2

## 2018-01-15 NOTE — MISCELLANEOUS
Message   Recorded as Task   Date: 07/11/2017 10:44 AM, Created By: Asif Bishop   Task Name: Call Back   Assigned To: Barton First   Regarding Patient: Leo Gill, Status: Active   Comment:    Triny Martin - 11 Jul 2017 10:44 AM     TASK CREATED  Pt questions if she may take a newly prescribed medication, Cymbalta prior to surgery  She did not see it on the list to hold    She may take        Signatures   Electronically signed by : Delphine Frost, ; Jul 11 2017 10:45AM EST                       (Author)

## 2018-01-16 ENCOUNTER — GENERIC CONVERSION - ENCOUNTER (OUTPATIENT)
Dept: OTHER | Facility: OTHER | Age: 43
End: 2018-01-16

## 2018-01-16 ENCOUNTER — HOSPITAL ENCOUNTER (OUTPATIENT)
Dept: RADIOLOGY | Facility: HOSPITAL | Age: 43
Discharge: HOME | End: 2018-01-16
Payer: OTHER MISCELLANEOUS

## 2018-01-16 DIAGNOSIS — S69.92XA INJURY OF LEFT HAND, INITIAL ENCOUNTER: ICD-10-CM

## 2018-01-16 DIAGNOSIS — R52 PAIN: ICD-10-CM

## 2018-01-16 PROCEDURE — 73218 MRI UPPER EXTREMITY W/O DYE: CPT

## 2018-01-18 NOTE — PROGRESS NOTES
Assessment    1  Encounter for postoperative care (V58 49) (Z48 89)   2  RSD (reflex sympathetic dystrophy) (337 20) (G90 50)   3  Chronic pain syndrome (338 4) (G89 4)   4  Battery end of life of spinal cord stimulator (V53 02) (Z46 2)    Discussion/Summary    Very pleasant 43year old female returns for two-week postoperative follow-up, status post IPG generator change right buttocks  she reports overall she is very pleased with surgical outcome, she reports her stimulator is operational and providing pain relief  She will also meet with the representative/technician for further programming and training following visit today  Wound care was reviewed with the patient, specifically she is to observe for redness, swelling, increased pain, drainage or fever, should these be observed she understands she is too call and/or return immediately for reassessment  Additionally the patient understands she may shower, wash with soap and water, pat wound dry, she also understands she is to avoid immersion in water such as swimming, hot tub use or tub bath, and may resume immersion in water in approximately 2 weeks  Activity levels were also reviewed with the patient in detail, she is to lift no greater than 10 pounds, she is advised she may occasionally bend, ambulation is encouraged as tolerated, she is advised she may gradually return to all usual activities over the next 2-3 weeks  She also understands should there be any significant change in her neurologic status; she is to call and/or return immediately for reassessment  Continue follow-up with her pain management physician Dr Karan Orozco is also advised as needed for any new programming issues, or other pain management issues  These findings, impressions and recommendations are reviewed in great detail with the patient, she expressed understanding and agreement, her questions were answered completely and to hier satisfaction   Follow up has been scheduled  The patient was counseled regarding instructions for management, patient and family education, importance of compliance with treatment  The patient has the current Goals: Continued control of her chronic pain syndrome  Patient is able to Self-Care  Chief Complaint  Operative follow-up, 2 weeks, status post IPG generator change      Post-Op  Post-Op Lumbar/Sacral Spine:   Sushil Muñiz is status post on 7/18/17   1  Removal of a right back implantable pulse generator Â   2  Placement of a new right buttock implantable pulse generator through separate incision  3  Electronic analysis complex programming spinal cord stimulator system postoperative period approximately 1 hour      History of Present Illness: The patient reports nausea, lower extremity weakness, lower extremity numbness, lower extremity pain and walking/standing/sitting intolerance, but no fever, no back pain and normal bowel and bladder function  Physical Examination:   Surgical incision site is clean, dry and intact (the incision site had intact staples and had no drainage )  The surrounding skin findings included normal appearance and Trace of erythema at the puncture sites from the surgical clips, no obvious signs of infection  (Surgical sites healing nicely)  Evaluation of the back demonstrates no warmth, no erythema, no swelling, no induration, no ecchymosis and no tenderness  The gait was wide-based ataxic  Lower Extremity DVT Assessment: no signs or symptoms suggestive of deep vein thrombosis, no calf swelling and no palpable cord in calf  Assessment:   Post-op, the patient is doing well, with good pain control, without signs of an infection and without evidence of a cerebrospinal fluid leak  Plan: To Do For Next Visit:  Further follow-up as needed  Review of Systems    Constitutional: feeling tired     Eyes: No complaints of eye pain, no red eyes, no eyesight problems, no discharge, no dry eyes, no itching of eyes  ENT: no complaints of earache, no loss of hearing, no nose bleeds, no nasal discharge, no sore throat, no hoarseness  Cardiovascular: No complaints of slow heart rate, no fast heart rate, no chest pain, no palpitations, no leg claudication, no lower extremity edema  Respiratory: No complaints of shortness of breath, no wheezing, no cough, no SOB on exertion, no orthopnea, no PND  Gastrointestinal: No complaints of abdominal pain, no constipation, no nausea or vomiting, no diarrhea, no bloody stools  Genitourinary: No complaints of dysuria, no incontinence, no pelvic pain, no dysmenorrhea, no vaginal discharge or bleeding  Musculoskeletal: arthralgias, limb pain and joint stiffness  Integumentary: skin wound and incisions  Neurological: numbness, tingling, limb weakness and difficulty walking, but as noted in HPI  Psychiatric: depression  Endocrine: muscle weakness  Hematologic/Lymphatic: No complaints of swollen glands, no swollen glands in the neck, does not bleed easily, does not bruise easily  ROS reviewed  Active Problems     1  Asthma (493 90) (J45 909)   2  Battery end of life of spinal cord stimulator (V53 02) (Z46 2)   3  Mononeuritis of upper limb, 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)   4  Post-operative state (V45 89) (Z98 890)   5  RSD (reflex sympathetic dystrophy) (337 20) (G90 50)   6  Trigger ring finger of right hand (727 03) (M65 341)   7  Vitamin B12 deficiency (266 2) (E53 8)   8  Vitamin D deficiency (268 9) (E55 9)    Chronic pain syndrome (338 4) (G89 4)          Social History    · Being A Social Drinker   · History of Current Every Day Smoker (305 1)   · Denied: History of Drug Use   · Former smoker (V15 82) (H92 424)    Current Meds   1  Baclofen 10 MG Oral Tablet; Therapy: 11ATW7009 to Recorded   2  Cymbalta 30 MG Oral Capsule Delayed Release Particles; Therapy: 23IOK0694 to Recorded   3  EpiPen 0 3 MG/0 3ML OSIRIS;    Therapy: 13GOV0461 to (Last CI:23CQM9020)  Requested for: 16NTA8929 Ordered   4  Gabapentin 600 MG Oral Tablet; Therapy: 31VGZ8571 to Recorded   5  Ibuprofen 800 MG Oral Tablet; Therapy: 72TKU6208 to Recorded   6  Prevacid 30 MG Oral Capsule Delayed Release; TAKE 1 CAPSULE TWICE DAILY; Therapy: 74ZYZ5831 to Recorded    Allergies    1  Codeine Sulfate TABS   2  Demerol SOLN   3  Iodine Crystals   4  Latex Gloves MISC   5  Penicillins   6  Percocet TABS   7  Shellfish-derived Products    8  Bee sting    Vitals   Recorded: 60Mqd6619 09:50AM   Temperature 98 5 F   Heart Rate 92   Respiration 16   Systolic 991   Diastolic 77   Height 5 ft 5 in   Weight 193 lb 6 4 oz   BMI Calculated 32 18   BSA Calculated 1 95     Physical Exam     Respiratory Respiratory effort: Normal   Auscultation of lungs: Clear to auscultation bilaterally  Cardiovascular Auscultation of heart: Rate is regular  Rhythm is regular  No heart murmur appreciated  Procedure  Procedure: suture removal  The wound was located on the right buttock and left buttock (Right buttocks Ã2 (5 + 5))  The wound was Combined approximately 10 cm in length  Wound Exam: well healed with no sign of infection  Procedure Note: Approximately 18 staples were removed  Patient Status:  the patient tolerated the procedure well  Complications:  there were no complications  Signatures   Electronically signed by : LUZ MARAI Martinez;  Aug  2 2017 10:25AM EST                       (Author)    Electronically signed by : JUSTA Perez ; Aug  8 2017 10:33AM EST                       (Author)

## 2018-01-18 NOTE — MISCELLANEOUS
Message   Recorded as Task   Date: 05/19/2017 03:26 PM, Created By: [facility]   Task Name: Miscellaneous   Assigned To: Andreia Redmond   Regarding Patient: Hernan Cain, Status: In Progress   Comment:    Andreia Redmond - 19 May 2017 3:26 PM     TASK CREATED    Received complaint from Britt Alcantara, she received a call from patient:  Kely Moraleseting that she is having all sorts of problems  It took her since March to find a doctor that accepts her insurance  Dr Yumi Lorenzo referred her to Dr Declan Rodrigez  Told her he was the best    She does not think so  She wants to know how someone can look at a chart and say, I cant help you  She feels she is being discriminated, treated very poorly and she wants to file an official complaint  Before hanging up, she started to cry  I did review her intake and documents we received from [facility]  There were multiple converstaions from staff that the patient was nasty and obnoxious when calling the office  Dr Declan Rodrigez did not want patient scheduled  I spoke with pt, listened to her concerns, that she felt she was being discriminated against due to her diagnosis and that she did not ask for this and feels she is being pushed aside due to her condition  I advised that we will not be seeing her due to her behavior  Pt does not understand how we can take their word against her  States she was always nice to them, never raised her voice or cursed  Was very respectful  States she is raising her son to be respectful and she would never do that  States she always followed their policies and when she went to the ER always had the doctors contact them  I again advised that we were not going to be seeing her  She stated that she is going to tyrone Dr Declan Rodrigez and "Yamini Dempsey goes to f  h " Pt was screaming this as she hung up  Andreia Redmond - 19 May 2017 3:26 PM     TASK IN PROGRESS        Active Problems    1   Asthma (493 90) (J45 909)   2  Mononeuritis of upper limb, 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)   3  RSD (reflex sympathetic dystrophy) (337 20) (G90 50)   4  Trigger ring finger of right hand (727 03) (M65 341)   5  Vitamin B12 deficiency (266 2) (E53 8)   6  Vitamin D deficiency (268 9) (E55 9)    Current Meds   1  Benzonatate 100 MG Oral Capsule; Therapy: 46Lkr2707 to (Last Rx:23Emj6363)  Requested for: 30Bks1492 Ordered   2  EpiPen 0 3 MG/0 3ML OSIRIS; Therapy: 38DPA2270 to (Last TH:14UHZ7103)  Requested for: 21TUE1504 Ordered   3  Fluticasone Propionate 50 MCG/ACT Nasal Suspension; Therapy: 86Xqf8040 to (Last Rx:34Fuh6503)  Requested for: 55Eme7448 Ordered   4  Ketoconazole 200 MG Oral Tablet; Therapy: 89OVM3777 to (Last Dhruv Coad)  Requested for: 2011 Ordered   5  MethylPREDNISolone 4 MG Oral Tablet Therapy Pack; TAKE as instructed; Therapy: 20BQC0955 to (Last Rx:2017) Ordered   6  Oxistat 1 % External Cream (Oxiconazole Nitrate); Therapy: 12BVS4202 to (Last Dhruv Coad)  Requested for: 2011 Ordered   7  PredniSONE 10 MG Oral Tablet; Therapy: 50XJL0940 to (Last Rx:2011)  Requested for: 13GQN4060 Ordered   8  Sulfamethoxazole-TMP -160 MG TABS; Therapy: 76CMW9349 to (Last C45RPB7851)  Requested for: 74NFW2069 Ordered    Allergies    1  Codeine Sulfate TABS   2  Demerol SOLN   3  Iodine Crystals   4  Latex Gloves MISC   5  Penicillins   6  Percocet TABS    7  Bee sting    Signatures   Electronically signed by :  Brit Espinosa, ; May 19 2017  3:26PM EST                       (Author)

## 2018-01-19 ENCOUNTER — GENERIC CONVERSION - ENCOUNTER (OUTPATIENT)
Dept: OTHER | Facility: OTHER | Age: 43
End: 2018-01-19

## 2018-01-20 DIAGNOSIS — M71.30: ICD-10-CM

## 2018-01-20 DIAGNOSIS — M79.645 PAIN OF FINGER OF LEFT HAND: ICD-10-CM

## 2018-01-22 VITALS
WEIGHT: 184 LBS | SYSTOLIC BLOOD PRESSURE: 141 MMHG | HEART RATE: 109 BPM | RESPIRATION RATE: 18 BRPM | BODY MASS INDEX: 30.66 KG/M2 | HEIGHT: 65 IN | DIASTOLIC BLOOD PRESSURE: 95 MMHG | TEMPERATURE: 99.2 F

## 2018-01-22 VITALS
TEMPERATURE: 98.5 F | HEART RATE: 92 BPM | HEIGHT: 65 IN | DIASTOLIC BLOOD PRESSURE: 77 MMHG | RESPIRATION RATE: 16 BRPM | SYSTOLIC BLOOD PRESSURE: 124 MMHG | BODY MASS INDEX: 32.22 KG/M2 | WEIGHT: 193.4 LBS

## 2018-01-22 VITALS
WEIGHT: 196.13 LBS | DIASTOLIC BLOOD PRESSURE: 92 MMHG | HEIGHT: 65 IN | HEART RATE: 85 BPM | BODY MASS INDEX: 32.68 KG/M2 | SYSTOLIC BLOOD PRESSURE: 132 MMHG

## 2018-01-24 VITALS
WEIGHT: 196 LBS | DIASTOLIC BLOOD PRESSURE: 84 MMHG | BODY MASS INDEX: 32.65 KG/M2 | HEIGHT: 65 IN | HEART RATE: 88 BPM | SYSTOLIC BLOOD PRESSURE: 119 MMHG

## 2018-01-25 ENCOUNTER — HOSPITAL ENCOUNTER (OUTPATIENT)
Dept: RADIOLOGY | Facility: HOSPITAL | Age: 43
Discharge: HOME | End: 2018-01-25
Attending: ORTHOPAEDIC SURGERY
Payer: OTHER MISCELLANEOUS

## 2018-01-25 DIAGNOSIS — M71.30: ICD-10-CM

## 2018-01-25 DIAGNOSIS — M79.645 PAIN OF FINGER OF LEFT HAND: ICD-10-CM

## 2018-01-25 PROCEDURE — 20604 DRAIN/INJ JOINT/BURSA W/US: CPT

## 2018-01-25 PROCEDURE — 76881 US COMPL JOINT R-T W/IMG: CPT

## 2018-01-25 RX ORDER — LIDOCAINE HYDROCHLORIDE 10 MG/ML
3 INJECTION, SOLUTION INFILTRATION; PERINEURAL ONCE
Status: COMPLETED | OUTPATIENT
Start: 2018-01-25 | End: 2018-01-25

## 2018-01-25 RX ADMIN — LIDOCAINE HYDROCHLORIDE 3 ML: 10 INJECTION, SOLUTION INFILTRATION; PERINEURAL at 10:27

## 2018-01-29 ENCOUNTER — TELEPHONE (OUTPATIENT)
Dept: OBGYN CLINIC | Facility: HOSPITAL | Age: 43
End: 2018-01-29

## 2018-01-29 NOTE — TELEPHONE ENCOUNTER
Patient states she has some swelling and is tender to touch at the base of the finger and finger is numb  Patient advised that it is normal to have some numbness  Advised ice and elevation 30 min on 30 min off and NSAIDS  No redness or drainage notes  Has good cap refill  F/U is scheduled for 2/26  Please advise

## 2018-01-29 NOTE — PROGRESS NOTES
Ms Sandra Hillman is a pleasant 36 y/o female who presented to [place] [address] on 1/25/18 to under go a left 3rd finger ganglion cyst aspiration  The patient had a successful left 3rd finger ganglion cyst aspiration  The patient called today due to persistent numbness of the left hand  Upon reviewing the patient's procedure notes, there were no procedure complications during or post aspiration  The patient was discharged home in stable condition on 1/25/18  Dr Yamilex Hanley spoke to the patient  From her report since the left 3rd finger ganglion cyst aspiration she has had persistent numbness of her left hand from the, "knucle of the 3rd finger through her palm " She stated she has not experienced these symptoms in the past with her RSD  She believes her symptoms stated post aspiration  She does not feel that her left hand numbness has improved since the aspiration  Dr Yamilex Hanley reviewed the patient's procedure  He discussed with the patient that at this time the affects of the local anesthesia would have worn off  Since the digital nerves are located along the sides of the finger and the injection site was at the volar aspect of the proximal phalanx, he did not feel that there was any nerve involvement during the procedure  Dr Yamilex Hanley  felt that if there was any nerve compromised it would be distal from the site of the injection, not located proximal to the injection site  He discussed in detail with the patient that this did not appear to be a mechanical injury related to the ganglion cyst aspiration  Due to the patient's underlying history of RSD, Dr Yamilex Hanley recommended that the patient be evaluated by her physician ( Dr Margi Joshi ) to determine if her symptoms of numbness along the alignment of the third left finger could possibly be an acute flare of her RSD related to her recent aspiration  The patient's questions and concerns were answered to her satisfaction   The patient verbalized her understanding and concurred that she should speak with   [address] [place] [address] to further investigate the cause of her symptoms  The patient was asked to call us with any additional questions or concerns  The report of the encounter was documented by Kelsy Navarrete PA-C for Dr Tanja Rossi

## 2018-01-29 NOTE — TELEPHONE ENCOUNTER
Patient sees Dr Eric Goodwin    Patient had a ultrasound/fluid removal procedure on 1/25/18 & she is stating that her middle finger, knuckle down is still numb

## 2018-02-23 ENCOUNTER — TELEPHONE (OUTPATIENT)
Dept: OBGYN CLINIC | Facility: HOSPITAL | Age: 43
End: 2018-02-23

## 2018-02-23 NOTE — TELEPHONE ENCOUNTER
Caller: Patients insurance company  Caller is leaving a message for Jeanette Velasquez   As of January 1st, [facility] does not require a referral to see a specialist

## 2018-02-26 ENCOUNTER — OFFICE VISIT (OUTPATIENT)
Dept: OBGYN CLINIC | Facility: CLINIC | Age: 43
End: 2018-02-26
Payer: COMMERCIAL

## 2018-02-26 VITALS
SYSTOLIC BLOOD PRESSURE: 114 MMHG | HEART RATE: 91 BPM | HEIGHT: 65 IN | DIASTOLIC BLOOD PRESSURE: 74 MMHG | WEIGHT: 205 LBS | BODY MASS INDEX: 34.16 KG/M2

## 2018-02-26 DIAGNOSIS — M67.442 GANGLION CYST OF FLEXOR TENDON SHEATH OF FINGER OF LEFT HAND: Primary | ICD-10-CM

## 2018-02-26 DIAGNOSIS — R22.31 MASS OF RIGHT ELBOW: ICD-10-CM

## 2018-02-26 PROCEDURE — 99213 OFFICE O/P EST LOW 20 MIN: CPT | Performed by: ORTHOPAEDIC SURGERY

## 2018-02-26 RX ORDER — IBUPROFEN 800 MG/1
TABLET ORAL EVERY 6 HOURS PRN
COMMUNITY
End: 2019-02-06 | Stop reason: ALTCHOICE

## 2018-02-26 NOTE — PROGRESS NOTES
ASSESSMENT/PLAN:    There are no diagnoses linked to this encounter  Assessment:   37year old female with Volar ganglion cyst  Left long finger flexor tendon - resolved  Lipoma    Plan:   Pt presents today after successful ultra sound guided aspiration of her left long finger tendon sheath ganglion cyst in January  MRI from last month also shows no evidence of nodules or tenosynovitis  Today on exam there is no palpable nodule and no triggering or locking  Besides some subjective tenderness and numbness of the long finger there are no other abnormalities and full ROM  It was also explained to her that a small soft nontender mass on her right elbow is consistent with a lipoma  She was given an order to return to work with no restrictions from a tendon standpoint  She was told otherwise to Resume activities as tolerated    Follow Up:  PRN      _____________________________________________________  CHIEF COMPLAINT:  Chief Complaint   Patient presents with    Left Middle Finger - Pain, Numbness         SUBJECTIVE:  June Javier is a 37y o  year old female who presents for follow up regarding  Left long finger tendon sheath ganglion cyst   She was referred to this office by Dr Jhon Vitale, who saw her for this problem as result of an injury she ha worked attempting After her initial referral to this office, she was sent for ultrasound guided  Aspiration of the cyst which was successfully performed  We she also had MRI of her left hand which was unremarkable for any nodules or tenosynovitis  She still complains today of pain over left long finger MCP joint, numbness all aspects of left long finger, and a subjective sense of her fingers  Locking while trying to extend  She cannot identify any provocative or alleviating factors  She has also noticed a soft, painless, small mass behind her right elbow   And cannot recall exactly when this 1st occurred  She denies any new injury to this hand or finger    She denies any worsening or change in her symptoms       PAST MEDICAL HISTORY:  Past Medical History:   Diagnosis Date    Anxiety     Asthma     Back pain     Chronic narcotic dependence (HCC)     CRPS (complex regional pain syndrome type I)     Depression     Fracture     2015 right knee fracture    Latex allergy     Limb alert care status     RUE, RLE    Mobility impaired     uses cane    Optic neuritis, left     PONV (postoperative nausea and vomiting)     RSD (reflex sympathetic dystrophy)     CRPS RUE/RLE    Shortness of breath     Wears glasses        PAST SURGICAL HISTORY:  Past Surgical History:   Procedure Laterality Date    IMPLANTATION / PLACEMENT EPIDURAL NEUROSTIMULATOR ELECTRODES Right     KNEE ARTHROSCOPY Right     VT IMPLANT SPINAL NEUROSTIM/ Right 7/18/2017    Procedure: PLACEMENT OF NEW SPINAL COLUMN STIMULATOR IN THE RIGHT BUTTOCK THROUGH SEPERATE INCISION WITH TUNNELING OF LEAD; POSSIBLE PLACEMENT OF EXTENSION;  Surgeon: Quinn Hatchet, MD;  Location: [facility];  Service: Neurosurgery    VT REVISE/REMOVE SPINAL NEUROSTIM/ Right 7/18/2017    Procedure: REMOVAL RIGHT FLANK SPINAL COLUMN STIMULATOR IPG;  Surgeon: Quinn Hatchet, MD;  [facility];  Service: Neurosurgery    VT SURG IMPLNT   Lamar Tong [address] N/A 7/14/2016    Procedure: LAMINECTOMY  T10 -6 WITH  PLACEMENT OF  SPINAL CORD STIMULATOR AND PULSE GEBNERATOR  IMPULSE MONITORING ;  Surgeon: Everardo Zarate MD;  Location[facility];  Service: Orthopedics    TUBAL LIGATION      WISDOM TOOTH EXTRACTION         FAMILY HISTORY:  Family History   Problem Relation Age of Onset    Adopted: Yes       SOCIAL HISTORY:  Social History   Substance Use Topics    Smoking status: Former Smoker     Packs/day: 1 00     Years: 5 00     Quit date: 6/22/2010    Smokeless tobacco: Never Used    Alcohol use Yes      Comment: occ       MEDICATIONS:    Current Outpatient Prescriptions:     albuterol (PROVENTIL HFA,VENTOLIN HFA) 90 mcg/act inhaler, Inhale 2 puffs as needed for wheezing , Disp: , Rfl:     baclofen 10 mg tablet, Take 10 mg by mouth 2 (two) times a day, Disp: , Rfl:     DULoxetine (CYMBALTA) 30 mg delayed release capsule, Take 20 mg by mouth daily, Disp: , Rfl:     EPINEPHrine (EPIPEN 2-HERNAN) 0 3 mg/0 3 mL SOAJ, Inject 0 3 mg into the shoulder, thigh, or buttocks once, Disp: , Rfl:     GABAPENTIN PO, Take 1,200 mg by mouth 3 (three) times a day, Disp: , Rfl:     ibuprofen (MOTRIN) 800 mg tablet, Take by mouth every 6 (six) hours as needed for mild pain, Disp: , Rfl:     lansoprazole (PREVACID) 30 mg capsule, Take 30 mg by mouth daily, Disp: , Rfl:     lidocaine (LIDODERM) 5 %, Place 1 patch on the skin every 24 hours for 30 days Remove & Discard patch within 12 hours or as directed by MD, Disp: 30 patch, Rfl: 0    omeprazole (PriLOSEC) 20 mg delayed release capsule, Take 1 capsule by mouth daily for 30 days, Disp: 30 capsule, Rfl: 0    ondansetron (ZOFRAN) 4 mg tablet, Take 1 tablet by mouth every 8 (eight) hours as needed for nausea or vomiting for up to 3 days, Disp: 10 tablet, Rfl: 0    TiZANidine (ZANAFLEX) 4 MG capsule, Take 1 capsule by mouth 3 (three) times a day for 30 days (Patient taking differently: Take 4 mg by mouth 2 (two) times a day  ), Disp: 90 capsule, Rfl: 0    traMADol (ULTRAM) 50 mg tablet, Take 50 mg by mouth every 8 (eight) hours as needed for moderate pain Pt takes 2 tabs=100mg/3x a day, Disp: , Rfl:     ALLERGIES:  Allergies   Allergen Reactions    Bee Venom Anaphylaxis    Codeine Anaphylaxis     Reaction Date: 26Apr2011;   Possible other narcotics; pt may take Narco    Iodinated Diagnostic Agents Anaphylaxis    Latex Anaphylaxis    Other Anaphylaxis     Narcotics  the patient states, "I can only take Dilaudid and Toradol"    Penicillins Anaphylaxis     Anaphylaxis    Robaxin [Methocarbamol] Other (See Comments)     Stroke symptoms    Shellfish-Derived Products Anaphylaxis    Hydrocodone-Guaifenesin     Iodides     Iodine      Reaction Date: 26Apr2011;     Meperidine      Reaction Date: 26Apr2011;     Oxycodone Hcl      Reaction Date: 26Apr2011;     Percocet [Oxycodone-Acetaminophen]      From pre-op orders       REVIEW OF SYSTEMS:  Pertinent items are noted in HPI  A comprehensive review of systems was negative except for:   General: Weight Gain    LABS:  HgA1c: No results found for: HGBA1C  BMP:   Lab Results   Component Value Date    GLUCOSE 111 05/29/2017    CALCIUM 8 6 05/29/2017     05/29/2017    K 3 9 05/29/2017    CO2 23 05/29/2017     (H) 05/29/2017    BUN 10 05/29/2017    CREATININE 0 94 05/29/2017           _____________________________________________________  PHYSICAL EXAMINATION:  General: well developed and well nourished, alert, oriented times 3 and appears comfortable  Psychiatric: Mild mood lability  HEENT: Trachea Midline, No torticollis  Cardiovascular: No discernable arrhythmia  Pulmonary: No wheezing or stridor  Skin: No masses, erthema, lacerations, fluctation, ulcerations  Neurovascular:  Motor Intact to the Median, Ulnar, Radial Nerve, Pulses Intact and sensation intact in left hand except for all aspects of left long finger    MUSCULOSKELETAL EXAMINATION:  LEFT SIDE:  Finger:  No instability, Full ROM, No Triggering  long finger, No evidence of Nodules  long finger, Tenderness over volar aspect of left long finger MCPJ and no palpable recurrence of ganglion cyst  Right elbow: 1-1/2 by 1 cm fatty mass posterior lateral to triceps tendon  _____________________________________________________  STUDIES REVIEWED:  No Studies to review      PROCEDURES PERFORMED:  Procedures  No Procedures performed today

## 2018-03-28 ENCOUNTER — OFFICE VISIT (OUTPATIENT)
Dept: NEUROSURGERY | Facility: CLINIC | Age: 43
End: 2018-03-28
Payer: COMMERCIAL

## 2018-03-28 VITALS
HEART RATE: 100 BPM | BODY MASS INDEX: 34.42 KG/M2 | TEMPERATURE: 99.4 F | HEIGHT: 65 IN | DIASTOLIC BLOOD PRESSURE: 90 MMHG | WEIGHT: 206.6 LBS | SYSTOLIC BLOOD PRESSURE: 138 MMHG | RESPIRATION RATE: 16 BRPM

## 2018-03-28 DIAGNOSIS — M54.16 LUMBAR RADICULOPATHY: ICD-10-CM

## 2018-03-28 DIAGNOSIS — G90.511 COMPLEX REGIONAL PAIN SYNDROME TYPE 1 OF RIGHT UPPER EXTREMITY: ICD-10-CM

## 2018-03-28 DIAGNOSIS — G89.4 CHRONIC PAIN SYNDROME: Primary | ICD-10-CM

## 2018-03-28 PROCEDURE — 99213 OFFICE O/P EST LOW 20 MIN: CPT | Performed by: NEUROLOGICAL SURGERY

## 2018-03-28 RX ORDER — GABAPENTIN 600 MG/1
1200 TABLET ORAL 3 TIMES DAILY
Refills: 0 | COMMUNITY
Start: 2018-03-10 | End: 2018-04-23

## 2018-03-28 NOTE — PROGRESS NOTES
Assessment/Plan:    No problem-specific Assessment & Plan notes found for this encounter  Diagnoses and all orders for this visit:    Chronic pain syndrome    Complex regional pain syndrome type 1 of right upper extremity    Lumbar radiculopathy    Other orders  -     gabapentin (NEURONTIN) 600 MG tablet; Take 1,200 mg by mouth 3 (three) times a day        Summary: This is a 44 yo female with CRPS right side including arm and leg  She has a single Medtronic right sided gutter cervical perc entering at upper thoracic connecting to a right flank IPG without extension  She is now 8 months from replacement with repositioning secondary to malfunction and pain at site  She is doing well with this  She also has a Medtronic tripole paddle covering T9 and T10 with a left buttock IPG  She is reporting features of complete malfunction of this IPG  The device is not holding charge and has difficulty charging to full capacity  She also has pain at the IPG site  After discussion of options we will proceed with replacement with repositioning of the left sided IPG to a more inferior/lateral position  The risks and benefits of surgery were reviewed with the patient and family and they wish to proceed  These risks include, but are not limited to bleeding, infection, device malfunction, and malpositioning  All questions were answered and appropriate contact information was given in case questions arise in the future  They will undergo preoperative clearance and be scheduled for surgery in the near future  Subjective:      Patient ID: Mere Courtney is a 37 y o  female  HPI:    She presents now approximately 8 months from her last generator replacement for her cervical spinal cord stimulator  She is reporting that she has pain at the left lower back generator site that is connected to her thoracic stimulator  She also reports that she is having difficulty charging this device    The device is not maintaining a charge and can take several hours to charge device  Her present pain physician is Dr Raquel Li  From previous office note:   She has full right arm CRPS  She has had a perc cervical SCS system placed in 2014 by Dr Nick Rodríguez with a right flank IPG  She reports that the IPG was turning ON and off when first placed and she also was not obtaining relief with the stimulation  She therefore turned it off for one year  When she turned it back on she had similar problems, but was able to get relief  She has had one year of charging difficulty and it turned off  She reports sharp pain at the IPG site  She also had a Thoracic Tripole paddle placed with left buttock IPG in 2016 by Dr River Merida which works well for her right knee pain  Uses cane at baseline  In review from our previous visit several years ago, This is a pleasant 46yo female with a one year history of progressively worsening right upper extremity CRPS/RSD  She reports it started with tingling in her hand that progressively worsened into a sharp pain involving her hand and forearm most significant on the lateral aspect  More recently she reports the progression up to her shoulder  She has intermittent swelling and redness of her forearm and hand  She has lost function of her 4th and 5th digits  She has no history of trauma  She underwent a successful SCS trial with Dr Madhu Varghese with the lead placed at C3-4 with greater than 70% pain relief  Her primary concerns include taking care of her young son  The following portions of the patient's history were reviewed and updated as appropriate: allergies, current medications, past family history, past medical history, past social history and past surgical history  Review of Systems   Constitutional: Positive for fatigue  HENT: Negative  Eyes: Negative  Respiratory: Negative  ASthma   Cardiovascular: Negative  Gastrointestinal: Negative  Endocrine: Negative  Genitourinary: Negative  Musculoskeletal: Positive for back pain (Left LBP radiating to right) and gait problem  Skin: Negative  Allergic/Immunologic: Negative  Neurological: Positive for tremors (right arm tremor, CRPS)  Hematological: Negative  Psychiatric/Behavioral: Positive for dysphoric mood  The patient is nervous/anxious  Objective:      /90 (BP Location: Left arm, Patient Position: Sitting, Cuff Size: Standard)   Pulse 100   Temp 99 4 °F (37 4 °C) (Tympanic)   Resp 16   Ht 5' 5" (1 651 m)   Wt 93 7 kg (206 lb 9 6 oz)   BMI 34 38 kg/m²          Physical Exam   Constitutional: She is oriented to person, place, and time  She appears well-developed  HENT:   Head: Normocephalic  Eyes: Pupils are equal, round, and reactive to light  Neck: Normal range of motion  Pulmonary/Chest: Effort normal    Musculoskeletal: Normal range of motion  Neurological: She is alert and oriented to person, place, and time  She has normal strength and normal reflexes  No sensory deficit  Well-healed incisions  Thoracic spinal cord stimulator in left flank  Tender to palpation  Psychiatric: She has a normal mood and affect   Her behavior is normal

## 2018-04-10 LAB
ALBUMIN SERPL-MCNC: 4.4 G/DL (ref 3.6–5.1)
ALBUMIN/GLOB SERPL: 1.5 (CALC) (ref 1–2.5)
ALP SERPL-CCNC: 93 U/L (ref 33–115)
ALT SERPL-CCNC: 20 U/L (ref 6–29)
APTT PPP: 28 SEC (ref 22–34)
AST SERPL-CCNC: 19 U/L (ref 10–30)
B-HCG SERPL-ACNC: <2 MIU/ML
BASOPHILS # BLD AUTO: 51 CELLS/UL (ref 0–200)
BASOPHILS NFR BLD AUTO: 0.6 %
BILIRUB SERPL-MCNC: 0.4 MG/DL (ref 0.2–1.2)
BUN SERPL-MCNC: 13 MG/DL (ref 7–25)
BUN/CREAT SERPL: NORMAL (CALC) (ref 6–22)
CALCIUM SERPL-MCNC: 9.4 MG/DL (ref 8.6–10.2)
CHLORIDE SERPL-SCNC: 104 MMOL/L (ref 98–110)
CO2 SERPL-SCNC: 23 MMOL/L (ref 20–31)
COLOR UR: NORMAL
CREAT SERPL-MCNC: 1 MG/DL (ref 0.5–1.1)
EOSINOPHIL # BLD AUTO: 136 CELLS/UL (ref 15–500)
EOSINOPHIL NFR BLD AUTO: 1.6 %
ERYTHROCYTE [DISTWIDTH] IN BLOOD BY AUTOMATED COUNT: 14 % (ref 11–15)
EST. AVERAGE GLUCOSE BLD GHB EST-MCNC: 114 (CALC)
EST. AVERAGE GLUCOSE BLD GHB EST-SCNC: 6.3 (CALC)
GLOBULIN SER CALC-MCNC: 2.9 G/DL (CALC) (ref 1.9–3.7)
GLUCOSE SERPL-MCNC: 88 MG/DL (ref 65–99)
HBA1C MFR BLD: 5.6 % OF TOTAL HGB
HCT VFR BLD AUTO: 40.9 % (ref 35–45)
HGB BLD-MCNC: 12.9 G/DL (ref 11.7–15.5)
INR PPP: 1
LYMPHOCYTES # BLD AUTO: 2142 CELLS/UL (ref 850–3900)
LYMPHOCYTES NFR BLD AUTO: 25.2 %
MCH RBC QN AUTO: 25.6 PG (ref 27–33)
MCHC RBC AUTO-ENTMCNC: 31.5 G/DL (ref 32–36)
MCV RBC AUTO: 81.2 FL (ref 80–100)
MONOCYTES # BLD AUTO: 510 CELLS/UL (ref 200–950)
MONOCYTES NFR BLD AUTO: 6 %
NEUTROPHILS # BLD AUTO: 5661 CELLS/UL (ref 1500–7800)
NEUTROPHILS NFR BLD AUTO: 66.6 %
PLATELET # BLD AUTO: 345 THOUSAND/UL (ref 140–400)
PMV BLD REES-ECKER: 12.2 FL (ref 7.5–12.5)
POTASSIUM SERPL-SCNC: 4.3 MMOL/L (ref 3.5–5.3)
PROT SERPL-MCNC: 7.3 G/DL (ref 6.1–8.1)
PROTHROMBIN TIME: 9.9 SEC (ref 9–11.5)
RBC # BLD AUTO: 5.04 MILLION/UL (ref 3.8–5.1)
SL AMB EGFR AFRICAN AMERICAN: 80 ML/MIN/1.73M2
SL AMB EGFR NON AFRICAN AMERICAN: 69 ML/MIN/1.73M2
SODIUM SERPL-SCNC: 139 MMOL/L (ref 135–146)
WBC # BLD AUTO: 8.5 THOUSAND/UL (ref 3.8–10.8)

## 2018-04-11 ENCOUNTER — PREP FOR PROCEDURE (OUTPATIENT)
Dept: NEUROSURGERY | Facility: CLINIC | Age: 43
End: 2018-04-11

## 2018-04-23 NOTE — PRE-PROCEDURE INSTRUCTIONS
Pre-Surgery Instructions:   Medication Instructions    albuterol (PROVENTIL HFA,VENTOLIN HFA) 90 mcg/act inhaler Instructed patient per Anesthesia Guidelines   baclofen 10 mg tablet Instructed patient per Anesthesia Guidelines   DULoxetine (CYMBALTA) 30 mg delayed release capsule Instructed patient per Anesthesia Guidelines   EPINEPHrine (EPIPEN 2-HERNAN) 0 3 mg/0 3 mL SOAJ Instructed patient per Anesthesia Guidelines   GABAPENTIN PO Instructed patient per Anesthesia Guidelines   ibuprofen (MOTRIN) 800 mg tablet Instructed patient per Anesthesia Guidelines

## 2018-04-24 ENCOUNTER — DOCUMENTATION (OUTPATIENT)
Dept: NEUROSURGERY | Facility: CLINIC | Age: 43
End: 2018-04-24

## 2018-04-24 ENCOUNTER — ANESTHESIA EVENT (OUTPATIENT)
Dept: PERIOP | Facility: HOSPITAL | Age: 43
End: 2018-04-24
Payer: COMMERCIAL

## 2018-04-24 DIAGNOSIS — Z98.890 POST-OPERATIVE STATE: Primary | ICD-10-CM

## 2018-04-24 RX ORDER — SULFAMETHOXAZOLE AND TRIMETHOPRIM 800; 160 MG/1; MG/1
1 TABLET ORAL EVERY 12 HOURS SCHEDULED
Qty: 14 TABLET | Refills: 0 | OUTPATIENT
Start: 2018-04-24 | End: 2018-05-01

## 2018-04-24 RX ORDER — MORPHINE SULFATE 15 MG/1
15 TABLET ORAL EVERY 6 HOURS PRN
Qty: 15 TABLET | Refills: 0 | Status: SHIPPED | OUTPATIENT
Start: 2018-04-24 | End: 2018-05-03 | Stop reason: SDUPTHER

## 2018-04-24 NOTE — TELEPHONE ENCOUNTER
When I called patient to go over pre-op instructions and inform her of post op pain medication that we were going to prescribe, patient mentioned that she was allergic to percocet, but that she can tolerate dilaudid  Per Dr Cristobal Alcala, okay to prescribe morphine rather than dilaudid since patient cannot tolerate percocet

## 2018-04-24 NOTE — TELEPHONE ENCOUNTER
S/w patient that is scheduled for surgery tomorrow 4/25/18  Verified allergies and went over pre-op instructions, including bathing and NPO status  Patient currently denies taking any blood thinning medications  Answered any questions  she may have had at this time  Confirmed pharmacy on file and sent over post-op antibiotic and explained to patient when to start taking it  Patient has allergy to PCN so I was going to call in Clindamycin but patient states that she has taken that in the past and it has caused hives  I added this to her allergy list and I called in Bactrim to her pharmacy on file  Patient states that she cannot tolerate percocet but she has taken dilaudid before  I informed patient that I will s/w Dr Niraj Carlin about this and see if he is willing to prescribe dilaudid for post op pain

## 2018-04-25 ENCOUNTER — HOSPITAL ENCOUNTER (OUTPATIENT)
Facility: HOSPITAL | Age: 43
Setting detail: OUTPATIENT SURGERY
Discharge: HOME | End: 2018-04-25
Attending: NEUROLOGICAL SURGERY | Admitting: NEUROLOGICAL SURGERY
Payer: COMMERCIAL

## 2018-04-25 ENCOUNTER — APPOINTMENT (OUTPATIENT)
Dept: RADIOLOGY | Facility: HOSPITAL | Age: 43
End: 2018-04-25
Payer: COMMERCIAL

## 2018-04-25 ENCOUNTER — ANESTHESIA (OUTPATIENT)
Dept: PERIOP | Facility: HOSPITAL | Age: 43
End: 2018-04-25
Payer: COMMERCIAL

## 2018-04-25 VITALS
HEIGHT: 65 IN | HEART RATE: 96 BPM | OXYGEN SATURATION: 95 % | RESPIRATION RATE: 16 BRPM | WEIGHT: 209 LBS | DIASTOLIC BLOOD PRESSURE: 82 MMHG | BODY MASS INDEX: 34.82 KG/M2 | TEMPERATURE: 99 F | SYSTOLIC BLOOD PRESSURE: 131 MMHG

## 2018-04-25 PROCEDURE — C1820 GENERATOR NEURO RECHG BAT SY: HCPCS | Performed by: NEUROLOGICAL SURGERY

## 2018-04-25 PROCEDURE — 63688 REV/RMV IMP SP NPG/R DTCH CN: CPT | Performed by: NEUROLOGICAL SURGERY

## 2018-04-25 PROCEDURE — C1787 PATIENT PROGR, NEUROSTIM: HCPCS | Performed by: NEUROLOGICAL SURGERY

## 2018-04-25 PROCEDURE — 86850 RBC ANTIBODY SCREEN: CPT | Performed by: NEUROLOGICAL SURGERY

## 2018-04-25 PROCEDURE — 95972 ALYS CPLX SP/PN NPGT W/PRGRM: CPT | Performed by: NEUROLOGICAL SURGERY

## 2018-04-25 PROCEDURE — 86901 BLOOD TYPING SEROLOGIC RH(D): CPT | Performed by: NEUROLOGICAL SURGERY

## 2018-04-25 PROCEDURE — 86900 BLOOD TYPING SEROLOGIC ABO: CPT | Performed by: NEUROLOGICAL SURGERY

## 2018-04-25 PROCEDURE — 63685 INS/RPLC SPI NPG/RCVR POCKET: CPT | Performed by: NEUROLOGICAL SURGERY

## 2018-04-25 DEVICE — NEUROSTIM INTELLIS SURESCAN MRI W/ ADAPTIVESTIM: Type: IMPLANTABLE DEVICE | Site: BUTTOCKS | Status: FUNCTIONAL

## 2018-04-25 RX ORDER — SODIUM CHLORIDE, SODIUM LACTATE, POTASSIUM CHLORIDE, CALCIUM CHLORIDE 600; 310; 30; 20 MG/100ML; MG/100ML; MG/100ML; MG/100ML
50 INJECTION, SOLUTION INTRAVENOUS CONTINUOUS
Status: DISCONTINUED | OUTPATIENT
Start: 2018-04-25 | End: 2018-04-25 | Stop reason: HOSPADM

## 2018-04-25 RX ORDER — ALBUTEROL SULFATE 2.5 MG/3ML
2.5 SOLUTION RESPIRATORY (INHALATION) ONCE AS NEEDED
Status: DISCONTINUED | OUTPATIENT
Start: 2018-04-25 | End: 2018-04-25 | Stop reason: HOSPADM

## 2018-04-25 RX ORDER — CHLORHEXIDINE GLUCONATE 0.12 MG/ML
15 RINSE ORAL EVERY 12 HOURS SCHEDULED
Status: DISCONTINUED | OUTPATIENT
Start: 2018-04-25 | End: 2018-04-25 | Stop reason: HOSPADM

## 2018-04-25 RX ORDER — ONDANSETRON 2 MG/ML
INJECTION INTRAMUSCULAR; INTRAVENOUS AS NEEDED
Status: DISCONTINUED | OUTPATIENT
Start: 2018-04-25 | End: 2018-04-25 | Stop reason: SURG

## 2018-04-25 RX ORDER — ONDANSETRON 2 MG/ML
4 INJECTION INTRAMUSCULAR; INTRAVENOUS ONCE AS NEEDED
Status: DISCONTINUED | OUTPATIENT
Start: 2018-04-25 | End: 2018-04-25 | Stop reason: HOSPADM

## 2018-04-25 RX ORDER — METOCLOPRAMIDE HYDROCHLORIDE 5 MG/ML
10 INJECTION INTRAMUSCULAR; INTRAVENOUS ONCE AS NEEDED
Status: DISCONTINUED | OUTPATIENT
Start: 2018-04-25 | End: 2018-04-25 | Stop reason: HOSPADM

## 2018-04-25 RX ORDER — LIDOCAINE HYDROCHLORIDE 10 MG/ML
INJECTION, SOLUTION INFILTRATION; PERINEURAL AS NEEDED
Status: DISCONTINUED | OUTPATIENT
Start: 2018-04-25 | End: 2018-04-25 | Stop reason: SURG

## 2018-04-25 RX ORDER — FENTANYL CITRATE/PF 50 MCG/ML
50 SYRINGE (ML) INJECTION
Status: DISCONTINUED | OUTPATIENT
Start: 2018-04-25 | End: 2018-04-25 | Stop reason: HOSPADM

## 2018-04-25 RX ORDER — LIDOCAINE HYDROCHLORIDE AND EPINEPHRINE 10; 10 MG/ML; UG/ML
INJECTION, SOLUTION INFILTRATION; PERINEURAL AS NEEDED
Status: DISCONTINUED | OUTPATIENT
Start: 2018-04-25 | End: 2018-04-25 | Stop reason: HOSPADM

## 2018-04-25 RX ORDER — VANCOMYCIN HYDROCHLORIDE 1 G/200ML
1000 INJECTION, SOLUTION INTRAVENOUS ONCE
Status: DISCONTINUED | OUTPATIENT
Start: 2018-04-25 | End: 2018-04-25

## 2018-04-25 RX ORDER — VANCOMYCIN HYDROCHLORIDE 1 G/200ML
1000 INJECTION, SOLUTION INTRAVENOUS ONCE
Status: COMPLETED | OUTPATIENT
Start: 2018-04-25 | End: 2018-04-25

## 2018-04-25 RX ORDER — SODIUM CHLORIDE, SODIUM LACTATE, POTASSIUM CHLORIDE, CALCIUM CHLORIDE 600; 310; 30; 20 MG/100ML; MG/100ML; MG/100ML; MG/100ML
INJECTION, SOLUTION INTRAVENOUS CONTINUOUS PRN
Status: DISCONTINUED | OUTPATIENT
Start: 2018-04-25 | End: 2018-04-25 | Stop reason: SURG

## 2018-04-25 RX ORDER — ONDANSETRON 2 MG/ML
4 INJECTION INTRAMUSCULAR; INTRAVENOUS EVERY 6 HOURS PRN
Status: DISCONTINUED | OUTPATIENT
Start: 2018-04-25 | End: 2018-04-25 | Stop reason: HOSPADM

## 2018-04-25 RX ORDER — PROMETHAZINE HYDROCHLORIDE 25 MG/ML
12.5 INJECTION, SOLUTION INTRAMUSCULAR; INTRAVENOUS ONCE AS NEEDED
Status: DISCONTINUED | OUTPATIENT
Start: 2018-04-25 | End: 2018-04-25 | Stop reason: HOSPADM

## 2018-04-25 RX ORDER — MORPHINE SULFATE 2 MG/ML
2 INJECTION, SOLUTION INTRAMUSCULAR; INTRAVENOUS
Status: DISCONTINUED | OUTPATIENT
Start: 2018-04-25 | End: 2018-04-25 | Stop reason: HOSPADM

## 2018-04-25 RX ORDER — FENTANYL CITRATE 50 UG/ML
INJECTION, SOLUTION INTRAMUSCULAR; INTRAVENOUS AS NEEDED
Status: DISCONTINUED | OUTPATIENT
Start: 2018-04-25 | End: 2018-04-25 | Stop reason: SURG

## 2018-04-25 RX ORDER — PROPOFOL 10 MG/ML
INJECTION, EMULSION INTRAVENOUS AS NEEDED
Status: DISCONTINUED | OUTPATIENT
Start: 2018-04-25 | End: 2018-04-25 | Stop reason: SURG

## 2018-04-25 RX ORDER — PROPOFOL 10 MG/ML
INJECTION, EMULSION INTRAVENOUS CONTINUOUS PRN
Status: DISCONTINUED | OUTPATIENT
Start: 2018-04-25 | End: 2018-04-25 | Stop reason: SURG

## 2018-04-25 RX ORDER — MORPHINE SULFATE 15 MG/1
15 TABLET ORAL ONCE
Status: COMPLETED | OUTPATIENT
Start: 2018-04-25 | End: 2018-04-25

## 2018-04-25 RX ORDER — MIDAZOLAM HYDROCHLORIDE 1 MG/ML
INJECTION INTRAMUSCULAR; INTRAVENOUS AS NEEDED
Status: DISCONTINUED | OUTPATIENT
Start: 2018-04-25 | End: 2018-04-25 | Stop reason: SURG

## 2018-04-25 RX ORDER — DIPHENHYDRAMINE HYDROCHLORIDE 50 MG/ML
INJECTION INTRAMUSCULAR; INTRAVENOUS AS NEEDED
Status: DISCONTINUED | OUTPATIENT
Start: 2018-04-25 | End: 2018-04-25 | Stop reason: SURG

## 2018-04-25 RX ADMIN — FENTANYL CITRATE 25 MCG: 50 INJECTION, SOLUTION INTRAMUSCULAR; INTRAVENOUS at 08:40

## 2018-04-25 RX ADMIN — VANCOMYCIN HYDROCHLORIDE 400 MG: 1 INJECTION, SOLUTION INTRAVENOUS at 08:10

## 2018-04-25 RX ADMIN — ONDANSETRON 4 MG: 2 INJECTION INTRAMUSCULAR; INTRAVENOUS at 10:17

## 2018-04-25 RX ADMIN — PROPOFOL 50 MG: 10 INJECTION, EMULSION INTRAVENOUS at 08:27

## 2018-04-25 RX ADMIN — DEXAMETHASONE SODIUM PHOSPHATE 10 MG: 10 INJECTION INTRAMUSCULAR; INTRAVENOUS at 08:27

## 2018-04-25 RX ADMIN — CHLORHEXIDINE GLUCONATE 15 ML: 1.2 RINSE ORAL at 07:23

## 2018-04-25 RX ADMIN — PROPOFOL 100 MCG/KG/MIN: 10 INJECTION, EMULSION INTRAVENOUS at 08:29

## 2018-04-25 RX ADMIN — ONDANSETRON 4 MG: 2 INJECTION INTRAMUSCULAR; INTRAVENOUS at 08:35

## 2018-04-25 RX ADMIN — PROPOFOL 50 MG: 10 INJECTION, EMULSION INTRAVENOUS at 08:29

## 2018-04-25 RX ADMIN — PROPOFOL 50 MG: 10 INJECTION, EMULSION INTRAVENOUS at 08:25

## 2018-04-25 RX ADMIN — MORPHINE SULFATE 15 MG: 15 TABLET ORAL at 10:51

## 2018-04-25 RX ADMIN — LIDOCAINE HYDROCHLORIDE 30 MG: 10 INJECTION, SOLUTION INFILTRATION; PERINEURAL at 08:25

## 2018-04-25 RX ADMIN — FENTANYL CITRATE 25 MCG: 50 INJECTION, SOLUTION INTRAMUSCULAR; INTRAVENOUS at 08:30

## 2018-04-25 RX ADMIN — FENTANYL CITRATE 50 MCG: 50 INJECTION, SOLUTION INTRAMUSCULAR; INTRAVENOUS at 09:35

## 2018-04-25 RX ADMIN — MIDAZOLAM HYDROCHLORIDE 2 MG: 1 INJECTION, SOLUTION INTRAMUSCULAR; INTRAVENOUS at 08:13

## 2018-04-25 RX ADMIN — SODIUM CHLORIDE, SODIUM LACTATE, POTASSIUM CHLORIDE, AND CALCIUM CHLORIDE: .6; .31; .03; .02 INJECTION, SOLUTION INTRAVENOUS at 08:04

## 2018-04-25 RX ADMIN — DIPHENHYDRAMINE HYDROCHLORIDE 25 MG: 50 INJECTION, SOLUTION INTRAMUSCULAR; INTRAVENOUS at 08:24

## 2018-04-25 NOTE — INTERIM OP NOTE
Removal of left flank spinal cord stimulator generator, placement of new spinal cord stimulator generator in left buttock through separate incision  Postoperative Note  PATIENT NAME: Demario Davidson  : 1975  MRN: [id number]  [facility] OR ROOM 09    Surgery Date: 2018    Preop Diagnosis:  Lumbar radiculopathy [M54 16]  Chronic pain syndrome [G89 4]    Post-Op Diagnosis Codes:     * Lumbar radiculopathy [M54 16]     * Chronic pain syndrome [G89 4]    Procedure(s) (LRB):  Removal of left flank spinal cord stimulator generator, placement of new spinal cord stimulator generator in left buttock through separate incision (N/A)    Surgeon(s) and Role:     * Marsha Woodard MD - Primary    Specimens:  * No specimens in log *    Estimated Blood Loss:   Minimal    Anesthesia Type:   IV Sedation with Anesthesia     Findings:    Impedances WNL    Complications:   None    SIGNATURE: Marsha Woodard MD   DATE: 2018   TIME: 9:01 AM

## 2018-04-25 NOTE — H&P (VIEW-ONLY)
Assessment/Plan:    No problem-specific Assessment & Plan notes found for this encounter  Diagnoses and all orders for this visit:    Chronic pain syndrome    Complex regional pain syndrome type 1 of right upper extremity    Lumbar radiculopathy    Other orders  -     gabapentin (NEURONTIN) 600 MG tablet; Take 1,200 mg by mouth 3 (three) times a day        Summary: This is a 44 yo female with CRPS right side including arm and leg  She has a single Medtronic right sided gutter cervical perc entering at upper thoracic connecting to a right flank IPG without extension  She is now 8 months from replacement with repositioning secondary to malfunction and pain at site  She is doing well with this  She also has a Medtronic tripole paddle covering T9 and T10 with a left buttock IPG  She is reporting features of complete malfunction of this IPG  The device is not holding charge and has difficulty charging to full capacity  She also has pain at the IPG site  After discussion of options we will proceed with replacement with repositioning of the left sided IPG to a more inferior/lateral position  The risks and benefits of surgery were reviewed with the patient and family and they wish to proceed  These risks include, but are not limited to bleeding, infection, device malfunction, and malpositioning  All questions were answered and appropriate contact information was given in case questions arise in the future  They will undergo preoperative clearance and be scheduled for surgery in the near future  Subjective:      Patient ID: Fox Ross is a 37 y o  female  HPI:    She presents now approximately 8 months from her last generator replacement for her cervical spinal cord stimulator  She is reporting that she has pain at the left lower back generator site that is connected to her thoracic stimulator  She also reports that she is having difficulty charging this device    The device is not maintaining a charge and can take several hours to charge device  Her present pain physician is Dr Preethi Hatch  From previous office note:   She has full right arm CRPS  She has had a perc cervical SCS system placed in 2014 by Dr Genet Astudillo with a right flank IPG  She reports that the IPG was turning ON and off when first placed and she also was not obtaining relief with the stimulation  She therefore turned it off for one year  When she turned it back on she had similar problems, but was able to get relief  She has had one year of charging difficulty and it turned off  She reports sharp pain at the IPG site  She also had a Thoracic Tripole paddle placed with left buttock IPG in 2016 by Dr Laurie Huggins which works well for her right knee pain  Uses cane at baseline  In review from our previous visit several years ago, This is a pleasant 44yo female with a one year history of progressively worsening right upper extremity CRPS/RSD  She reports it started with tingling in her hand that progressively worsened into a sharp pain involving her hand and forearm most significant on the lateral aspect  More recently she reports the progression up to her shoulder  She has intermittent swelling and redness of her forearm and hand  She has lost function of her 4th and 5th digits  She has no history of trauma  She underwent a successful SCS trial with Dr Beronica Rondon with the lead placed at C3-4 with greater than 70% pain relief  Her primary concerns include taking care of her young son  The following portions of the patient's history were reviewed and updated as appropriate: allergies, current medications, past family history, past medical history, past social history and past surgical history  Review of Systems   Constitutional: Positive for fatigue  HENT: Negative  Eyes: Negative  Respiratory: Negative  ASthma   Cardiovascular: Negative  Gastrointestinal: Negative  Endocrine: Negative  Genitourinary: Negative  Musculoskeletal: Positive for back pain (Left LBP radiating to right) and gait problem  Skin: Negative  Allergic/Immunologic: Negative  Neurological: Positive for tremors (right arm tremor, CRPS)  Hematological: Negative  Psychiatric/Behavioral: Positive for dysphoric mood  The patient is nervous/anxious  Objective:      /90 (BP Location: Left arm, Patient Position: Sitting, Cuff Size: Standard)   Pulse 100   Temp 99 4 °F (37 4 °C) (Tympanic)   Resp 16   Ht 5' 5" (1 651 m)   Wt 93 7 kg (206 lb 9 6 oz)   BMI 34 38 kg/m²          Physical Exam   Constitutional: She is oriented to person, place, and time  She appears well-developed  HENT:   Head: Normocephalic  Eyes: Pupils are equal, round, and reactive to light  Neck: Normal range of motion  Pulmonary/Chest: Effort normal    Musculoskeletal: Normal range of motion  Neurological: She is alert and oriented to person, place, and time  She has normal strength and normal reflexes  No sensory deficit  Well-healed incisions  Thoracic spinal cord stimulator in left flank  Tender to palpation  Psychiatric: She has a normal mood and affect   Her behavior is normal

## 2018-04-25 NOTE — OP NOTE
OPERATIVE REPORT  PATIENT NAME: Lorne Hernández    :  1975  MRN: [id number]  Pt Location: [facility] OR ROOM 09    SURGERY DATE: 2018    Surgeon(s) and Role:     * Dipak Santana MD - Primary    Preop Diagnosis:  Lumbar radiculopathy [M54 16]  Chronic pain syndrome [G89 4]    Post-Op Diagnosis Codes:     * Lumbar radiculopathy [M54 16]     * Chronic pain syndrome [G89 4]      Specimen(s):  * No specimens in log *    Estimated Blood Loss:   Minimal    Drains:       Anesthesia Type:   IV Sedation with Anesthesia    Operative Indications:  Lumbar radiculopathy [M54 16]  Chronic pain syndrome [G19 3]      Complications:   None    Procedure and Technique:  1  Removal of a left flank implantable pulse generator    2  Placement of a new left buttock implantable pulse generator through separate incision  3  Electronic analysis complex programming spinal cord stimulator system postoperative period approximately 1 hour     Operative Findings:  Impedances WNL     Implants:  1  [employer] Intellis rechargeable Generator [id number]     Indications for procedure; This is a 49-year-old female with a long-standing history of chronic pain involving the lower back and lower extremities  Previous placed SCS system that delivers efficacy and now presents at EOL of IPG  He also has pain at the current IPG site and would like it repositioned  The risks and benefits of the procedure for replacement reviewed with the patient and family and they wished to proceed      Description of procedure; The patient was identified and brought to the operating room where they underwent the induction of MAC anesthesia  Placed lateral on the operating room table with chest rolls  Prepped and draped in the usual sterile fashion  1g of Vanco was administered as antibiotic prophylaxis-stopped early for hives  Bilateral lower extremity SCDs were placed for DVT prophylaxis   A timeout was then performed      Attention placed on the left flank incision which was anesthetized with 1% lidocaine with epinephrine and incised with a 10 blade  Bovie electrocautery dissected the subcutaneous tissue  Then using sharp and blunt dissection the IPG was freed from the underlying pocket and  from the distal portion of the electrode  The electrode was then freed from the underlying tissue      Attention placed on the new left buttock incision which was anesthetized with 1% lidocaine with epinephrine and incised with a 10 blade inferior and lateral to the previous incision  Bovie electrocautery dissected the subcutaneous tissue  Then using sharp and blunt dissection a pocket was created in the inferior direction  Electrode then tunneled to this incision  The distal portion of the electrode was then connected to the new generator and secured  Both the excess lead and generator were then placed into the pocket and secured with a 2-0 silk suture  System was interrogated and working within normal limits with normal impedances      Incisions copiously irrigated with antibiotic-induced solution  The left flank incision had the pocket closed upon itself with an interrupted 2-0 Vicryl plus suture  The skin was approximated with interrupted inverted 2-0 Vicryl plus suture with stainless steel staples for the skin  The new left buttock incision had the pocket closed over the generator with an interrupted 2-0 Vicryl plus suture  The skin was approximated with interrupted inverted 2-0 Vicryl plus suture with stainless steel staples for the skin  Routine sterile dressings were then applied      At the end of the case all counts were reported to be correct  There was no breaks in surgical technique or complications encountered during the procedure   The patient woke from anesthesia in stable condition being taken to the recovery room      In the postoperative period the patient underwent electronic analysis and complex programming of the spinal cord stimulator system for approximately 1 hour  The final settings used bipole configuration middle on the tripole electrode  This was run at a pulse width of 350 and a rate of 40 Hz       I was present for the entire procedure    Patient Disposition:  PACU     SIGNATURE: Artie Ulrich MD  DATE: April 25, 2018  TIME: 9:01 AM

## 2018-04-25 NOTE — DISCHARGE INSTRUCTIONS
Follow Up Dr Anny Rosen 2 weeks  Remove Dressing 3 days  Antibiotics prescription at pharmacy  Morphine prescription at [address]    What happens when I go home after the Spinal Cord Stimulator? Surgical site: The dressing may be removed after 3 days and left off  There is no special care for your incision  Activity:   Resume normal activity level, but limit bending, lifting, and twisting for 2 weeks  Bathing: You may shower after 3 days  Driving: You may resume driving in one week  The stimulator must be turned off when driving  If riding as a passenger, it is okay to leave the stimulator on  Pain Medicine: If you were given a prescription for a new pain medicine, it can be taken in addition to any pain medicine you are already taking  The new medicine should only be needed for about one week to help with any   surgical pain  Take the new medicine only as needed  Please call the office at [phone] if you have any of the followin  Redness, swelling, heat, or unusual drainage (green, yellow, white, smelly)   from the surgical site  2  Heavy bleeding from the surgical site  3  Chills or fever above 101 degrees   4  Pain not relieved by medicine  5  Questions or concerns about your surgery

## 2018-04-25 NOTE — ANESTHESIA POSTPROCEDURE EVALUATION
Post-Op Assessment Note      CV Status:  Stable    Mental Status:  Awake    Hydration Status:  Euvolemic    PONV Controlled:  Controlled    Airway Patency:  Patent    Post Op Vitals Reviewed: Yes          Staff: CRNA           BP   133/70   Temp   98 1   Pulse  95   Resp   21   SpO2   98 FMO2 6

## 2018-04-25 NOTE — ANESTHESIA PREPROCEDURE EVALUATION
Review of Systems/Medical History  Patient summary reviewed  Chart reviewed  History of anesthetic complications     Cardiovascular  EKG reviewed,   Comment: Normal sinus rhythm  Normal ECG  When compared with ECG of 26-APR-2011 15:27,  No significant change since prior tracing    Confirmed by Farnaz Chávez MD, Demetris Line ([id number]) on 1/19/2017 3:25:46 PM    Specimen Collected: 01/19/17 13:33  Last Resulted: 01/19/17 15:25      ,  Pulmonary  Asthma: ,        GI/Hepatic            Endo/Other     GYN       Hematology   Musculoskeletal       Neurology   Psychology   Anxiety, Depression ,              Physical Exam    Airway    Mallampati score: II  TM Distance: >3 FB  Neck ROM: full     Dental   No notable dental hx     Cardiovascular  Cardiovascular exam normal    Pulmonary  Pulmonary exam normal Breath sounds clear to auscultation,     Other Findings        Anesthesia Plan  ASA Score- 2     Anesthesia Type- IV sedation with anesthesia with ASA Monitors  Additional Monitors:   Airway Plan:         Plan Factors- Patient instructed to abstain from smoking on day of procedure       Induction- intravenous  Postoperative Plan-     Informed Consent- Anesthetic plan and risks discussed with patient  I personally reviewed this patient with the CRNA  Discussed and agreed on the Anesthesia Plan with the CRNA             Lab Results   Component Value Date    WBC 9 80 05/29/2017    HGB 12 9 04/09/2018    HCT 40 9 04/09/2018    MCV 81 2 04/09/2018     04/09/2018     Lab Results   Component Value Date    GLUCOSE 111 05/29/2017    CALCIUM 9 4 04/09/2018     05/29/2017    K 3 9 05/29/2017    CO2 23 05/29/2017     (H) 05/29/2017    BUN 13 04/09/2018    CREATININE 1 00 04/09/2018     Lab Results   Component Value Date    INR 1 0 04/09/2018    INR 0 94 06/22/2016    PROTIME 12 6 06/22/2016     Lab Results   Component Value Date    PTT 30 06/22/2016     Type and Screen:  ROSHAN DOMINGUEZ, Dr Meng Reyes, the attending physician, have personally seen and evaluated the patient prior to anesthetic care  I have reviewed the pre-anesthetic record, and other medical records if appropriate to the anesthetic care  If a CRNA is involved in the case, I have reviewed the CRNA assessment, if present, and agree  The patient is in a suitable condition to proceed with my formulated anesthetic plan

## 2018-04-27 ENCOUNTER — TELEPHONE (OUTPATIENT)
Dept: NEUROSURGERY | Facility: CLINIC | Age: 43
End: 2018-04-27

## 2018-04-30 ENCOUNTER — TELEPHONE (OUTPATIENT)
Dept: NEUROSURGERY | Facility: CLINIC | Age: 43
End: 2018-04-30

## 2018-04-30 NOTE — TELEPHONE ENCOUNTER
Patient telephoned reports she has pain in left  buttock, is unable to lie on left side  secondary to pain, B/P elevated  138/90 , HR  110, BL B/p 109-110/70 , and Hr 70-80  S/P surgery 4/25 Removal of left flank spinal cord stimulator generator, placement of new spinal cord stimulator generator in left buttock through separate incision (N/A Head)  Explained surgery , removal of IPG for replacement , scar tissue adherent to prior generator , surgical process will result in operative edema and swelling and resultant pain  Morphine IR ordered 15 mg every 6 hours , she reported Friday taking 15 mg in am and dose in PM   Advised to take as prescribed  For the next 48-72 hours  Position self to avoid direct pressure on operative area, use body pillow etc    Contact PCP regarding elevated B/P and HR  Inform then she is post op and has contact [facility] regarding pain  Management, need to take medication as prescribed  Reports she feels better after talking to me and reassuring her

## 2018-05-02 ENCOUNTER — TELEPHONE (OUTPATIENT)
Dept: NEUROSURGERY | Facility: CLINIC | Age: 43
End: 2018-05-02

## 2018-05-02 DIAGNOSIS — Z98.890 POST-OPERATIVE STATE: ICD-10-CM

## 2018-05-02 NOTE — TELEPHONE ENCOUNTER
Reports continued postoperative pain in left buttock s/p surgery 5/24/2014, request refill Morphine 15 mg ,  will p/u script tomorrow  She yrn not sound appropriate over phone questioned if something was wrong, she reports hot water turned off today  Informed me [facility] will turn  back on if a medical necessity note received  She provided phone number  And account number for [facility]   Telephoned [facility] , they lenora fax form I need to complete and return  [facility] reports they have 24 hours to turn hot  water back on  Returned call to patient with update

## 2018-05-03 ENCOUNTER — DOCUMENTATION (OUTPATIENT)
Dept: NEUROSURGERY | Facility: CLINIC | Age: 43
End: 2018-05-03

## 2018-05-03 RX ORDER — MORPHINE SULFATE 15 MG/1
15 TABLET ORAL EVERY 6 HOURS PRN
Qty: 15 TABLET | Refills: 0 | Status: SHIPPED | OUTPATIENT
Start: 2018-05-03 | End: 2018-09-02

## 2018-05-03 NOTE — TELEPHONE ENCOUNTER
Telephoned patient this morning update papers faxed to [facility] last evening, they report have 24 hours to act on  Healthcare request to resume utility  Services  She will call [facility] and verify  Discussed with patient her pain ,  not picking up script until today   She is s/p replacement IPG buttock ---she insist her pain is not relieved by tylenol, at its worse is 8/10   Pain medication effective for  relief 3/10,duration about 5 hours then pain starts again    Refill script today  --son will pick-up

## 2018-05-07 ENCOUNTER — TELEPHONE (OUTPATIENT)
Dept: NEUROSURGERY | Facility: CLINIC | Age: 43
End: 2018-05-07

## 2018-05-07 NOTE — TELEPHONE ENCOUNTER
Patient called stating that she pulled out one of her stapled when she was taking off her shirt  She denies any bleeding/drainage/dehiscence of incision  Patient is scheduled to come in on Wednesday for her 2 week POV anyway and I informed her that at that point we will be removing the rest of her staples anyway  I informed her that in the meantime, she may cover the area with a dressing so that no other staples get pulled out  She was appreciative for the information and will call the office if anything changes in the meantime

## 2018-05-09 ENCOUNTER — OFFICE VISIT (OUTPATIENT)
Dept: NEUROSURGERY | Facility: CLINIC | Age: 43
End: 2018-05-09

## 2018-05-09 VITALS
HEIGHT: 65 IN | TEMPERATURE: 97.6 F | RESPIRATION RATE: 18 BRPM | WEIGHT: 209 LBS | SYSTOLIC BLOOD PRESSURE: 116 MMHG | DIASTOLIC BLOOD PRESSURE: 74 MMHG | BODY MASS INDEX: 34.82 KG/M2 | HEART RATE: 76 BPM

## 2018-05-09 DIAGNOSIS — M54.16 LUMBAR RADICULOPATHY: ICD-10-CM

## 2018-05-09 DIAGNOSIS — Z48.89 AFTERCARE FOLLOWING SURGERY: ICD-10-CM

## 2018-05-09 DIAGNOSIS — G89.4 CHRONIC PAIN SYNDROME: Primary | ICD-10-CM

## 2018-05-09 PROCEDURE — 99024 POSTOP FOLLOW-UP VISIT: CPT | Performed by: NURSE PRACTITIONER

## 2018-05-09 NOTE — PROGRESS NOTES
Assessment/Plan:  Problem List Items Addressed This Visit        Nervous and Auditory    Lumbar radiculopathy - Primary       Other    Aftercare following surgery    Chronic pain syndrome            Subjective:      Patient ID: Britney Dinh is a 37 y o  female  Reports she is here for removal of staples     HPI   She presents for 2 week postoperative visit for examination, staple removal and to meet with product REP  She is s/p surgery with DR Billie Oro on 4/25 for Removal of left flank spinal cord stimulator generator, placement of new spinal cord stimulator generator in left buttock through separate incision  She  Has a longstanding history of chronic pain involving the lower back and lower extremities  She has a previously implanted SCS system taht delivers efficacy  But has IPG EOL  She also complaint of pain and location of previous IPG and wanted repositioning  She reports efficacy for chronic pain relief  at 80 %  Reports she is taking a Morphine IR about one every 3 days  She has 2 operative incisions left flank incision all staples are missing  She explains she was covered with a couchette blanket, all twisted up, on removal it was caught -up in the staples then pulled pulled all the staples out  This incision shows no signs of trauma  Staples are removed from left lower lateral buttock  Incision  (9), she tolerated procedure well   Both incisions are clean , dry and intact, without erythema, dehiscence, drainage or s/s of infection, well healed with well approximated wound edges  The following instructions are reviewed in detail and continue thru next 4 weeks (6 week post op)    At 2 weeks postop can resume restricted medications such as ASA, products containing ASA, NSAID, fish oils, and OTC products or as previously directed  Resume driving 2 week postoperatively    Continue to observe incisions for redness, drainage, swelling dehiscence, increased pain, fever >/= 101, warmth to touch incision or skin surrounding, if occur call or report to office immediately for reassessment  Continue showers using clean towel and wash cloth with OTC antibacterial bodywash for an additional 2 weeks  Do not apply lotions, creams, powder, or ointments to surgical incisions  Activity as tolerated, no bending, lifting  greater than 10 lbs, turning, or stretching,  ambulation as tolerated  No Immersion in water such as swimming, hot tub, or tub bath  Informed of follow-up appointment in 4 week with Dr Diana Radford if there is any significant change in his/her neurologic status, call and/or return to the office immediately for reassessment   She met with product rep for device programing and teaching  Rep reports after session with patient all involved chronic pain areas  are covered with SCS  system  Explained chronic pain relief may not be immediate after reprogramming can take  Up to 72 hours to feel effect   Contact Rep immediately if there is any change in efficacy or concern over SCS system  Contact office if unable to reach Rep or if the reps   intervention does  not resolve issue  These findings, impressions and recommendations are reviewed in great detail with the patient  Patient verbalizes an understanding of information  She reports the right buttock IPG  Is nearing EOL readings will need replacement soon  She is scheduled for an appointment at the end of this 6 week postoperative cycle for consult with Dr Andressa Ramirez, a surgery date will be scheduled at that time  The following portions of the patient's history were reviewed and updated as appropriate:   She  has a past medical history of Anxiety; Asthma; Back pain; Chronic narcotic dependence ([address] ); CRPS (complex regional pain syndrome type I); Depression; Fracture; Latex allergy; Limb alert care status;  Mobility impaired; Optic neuritis, left; PONV (postoperative nausea and vomiting); RSD (reflex sympathetic dystrophy); and Wears glasses  She   Patient Active Problem List    Diagnosis Date Noted    Aftercare following surgery 05/10/2018    Lumbar radiculopathy 03/28/2018    Complex regional pain syndrome I 07/18/2017    Optic neuritis, left 01/09/2017    Chronic pain syndrome 77/44/1907    Uncomplicated asthma 92/81/8051     She  has a past surgical history that includes Knee arthroscopy (Right); Implantation / placement epidural neurostimulator electrodes (Right); Tubal ligation; [place] tooth extraction; pr surg implnt neuroelect,epidural (N/A, 7/14/2016); pr revise/remove spinal neurostim/ (Right, 7/18/2017); pr implant spinal neurostim/ (Right, 7/18/2017); and pr implant spinal neurostim/ (N/A, 4/25/2018)  Her family history is not on file  She was adopted  She  reports that she quit smoking about 7 years ago  She has a 5 00 pack-year smoking history  She has never used smokeless tobacco  She reports that she does not drink alcohol or use drugs  Current Outpatient Prescriptions   Medication Sig Dispense Refill    albuterol (PROVENTIL HFA,VENTOLIN HFA) 90 mcg/act inhaler Inhale 2 puffs as needed for wheezing   baclofen 10 mg tablet Take 10 mg by mouth 4 (four) times a day        DULoxetine (CYMBALTA) 30 mg delayed release capsule Take by mouth 2 (two) times a day        EPINEPHrine (EPIPEN 2-HERNAN) 0 3 mg/0 3 mL SOAJ Inject 0 3 mg into the shoulder, thigh, or buttocks once      GABAPENTIN PO Take 1,200 mg by mouth 3 (three) times a day      morphine (MSIR) 15 mg tablet Take 1 tablet (15 mg total) by mouth every 6 (six) hours as needed for severe pain Max Daily Amount: 60 mg 15 tablet 0    ibuprofen (MOTRIN) 800 mg tablet Take by mouth every 6 (six) hours as needed for mild pain       No current facility-administered medications for this visit        She is allergic to bee venom; codeine; iodinated diagnostic agents; latex; other; penicillins; robaxin [methocarbamol]; shellfish-derived products; hydrocodone-guaifenesin; iodides; iodine; meperidine; oxycodone hcl; percocet [oxycodone-acetaminophen]; and clindamycin       Review of Systems   HENT: Negative  Eyes: Negative  Cardiovascular: Negative  Gastrointestinal: Positive for diarrhea (x 1 5 days , taking Pepto Bismol)  Negative for abdominal distention, abdominal pain, anal bleeding, blood in stool, constipation, nausea, rectal pain and vomiting  Endocrine: Negative  Genitourinary: Negative  Musculoskeletal: Positive for back pain (radiates to right leg)  Negative for arthralgias, gait problem, joint swelling, myalgias, neck pain and neck stiffness  Skin: Negative  Allergic/Immunologic: Negative  Neurological: Positive for weakness (right hand), numbness (right leg) and headaches  Negative for dizziness, tremors, seizures, syncope, facial asymmetry, speech difficulty and light-headedness  Hematological: Negative  Psychiatric/Behavioral: Negative            Objective:  /74   Pulse 76   Temp 97 6 °F (36 4 °C) (Tympanic)   Resp 18   Ht 5' 5" (1 651 m)   Wt 94 8 kg (209 lb)   BMI 34 78 kg/m²          Physical Exam

## 2018-05-10 PROBLEM — Z48.89 AFTERCARE FOLLOWING SURGERY: Status: ACTIVE | Noted: 2018-05-10

## 2018-05-19 ENCOUNTER — HOSPITAL ENCOUNTER (EMERGENCY)
Facility: HOSPITAL | Age: 43
Discharge: HOME | End: 2018-05-19
Attending: EMERGENCY MEDICINE
Payer: COMMERCIAL

## 2018-05-19 VITALS
RESPIRATION RATE: 18 BRPM | DIASTOLIC BLOOD PRESSURE: 85 MMHG | WEIGHT: 210 LBS | BODY MASS INDEX: 34.95 KG/M2 | OXYGEN SATURATION: 96 % | TEMPERATURE: 97.7 F | SYSTOLIC BLOOD PRESSURE: 132 MMHG | HEART RATE: 93 BPM

## 2018-05-19 DIAGNOSIS — H10.9 CONJUNCTIVITIS: Primary | ICD-10-CM

## 2018-05-19 PROCEDURE — 99283 EMERGENCY DEPT VISIT LOW MDM: CPT

## 2018-05-19 RX ORDER — PROPARACAINE HYDROCHLORIDE 5 MG/ML
1 SOLUTION/ DROPS OPHTHALMIC ONCE
Status: COMPLETED | OUTPATIENT
Start: 2018-05-19 | End: 2018-05-19

## 2018-05-19 RX ADMIN — PROPARACAINE HYDROCHLORIDE 1 DROP: 5 SOLUTION/ DROPS OPHTHALMIC at 23:02

## 2018-05-19 RX ADMIN — FLUORESCEIN SODIUM 1 STRIP: 0.6 STRIP OPHTHALMIC at 23:02

## 2018-05-20 NOTE — ED PROVIDER NOTES
History  Chief Complaint   Patient presents with    Eye Pain     "long medical hx, I had optic neuritis, so as soon as I have pain I come to the ED right away"       44-year-old female with history of CP ROS presents to the emergency department with complaint of right eye pain, itching, tearing  Patient states she had optic neuritis last January which was treated and she was discharged, chart review states she did not have optic neuritis it was simply eye irritation  Patient states this feels exactly like her previous episode which was not her left  Patient also endorses 2 day history of right-sided headache which has gotten worse since her symptoms started this morning  She denies any discharge however her conjunctiva is injected  Patient denies any recent fevers or chills does not feel sick she states she has not had problems with her nerve stimulators for her CPRS  Patient denies any nausea or vomiting no chest pain or shortness of breath patient states her vision is slightly blurry on the right-hand side however her visual fields are intact has normal extraocular motion limited by pain which she had to rest for  Remaining ROS negative  History provided by:  Patient   used: No    Eye Problem   Location:  Right eye  Quality:  Aching  Severity:  Mild  Onset quality:  Sudden  Timing:  Constant  Progression:  Unchanged  Chronicity:  New  Context: not foreign body and not scratch    Relieved by:  None tried  Worsened by:  Nothing  Ineffective treatments:  None tried  Associated symptoms: blurred vision, itching and redness    Associated symptoms: no nausea and no vomiting        Prior to Admission Medications   Prescriptions Last Dose Informant Patient Reported? Taking?    DULoxetine (CYMBALTA) 30 mg delayed release capsule   Yes No   Sig: Take by mouth 2 (two) times a day     EPINEPHrine (EPIPEN 2-HERNAN) 0 3 mg/0 3 mL SOAJ   Yes No   Sig: Inject 0 3 mg into the shoulder, thigh, or buttocks once   GABAPENTIN PO   Yes No   Sig: Take 1,200 mg by mouth 3 (three) times a day   albuterol (PROVENTIL HFA,VENTOLIN HFA) 90 mcg/act inhaler  Self Yes No   Sig: Inhale 2 puffs as needed for wheezing     baclofen 10 mg tablet  Self Yes No   Sig: Take 10 mg by mouth 4 (four) times a day     ibuprofen (MOTRIN) 800 mg tablet   Yes No   Sig: Take by mouth every 6 (six) hours as needed for mild pain   morphine (MSIR) 15 mg tablet   No No   Sig: Take 1 tablet (15 mg total) by mouth every 6 (six) hours as needed for severe pain Max Daily Amount: 60 mg      Facility-Administered Medications: None       Past Medical History:   Diagnosis Date    Anxiety     Asthma     Back pain     Chronic narcotic dependence (HCC)     CRPS (complex regional pain syndrome type I)     Depression     Fracture     2015 right knee fracture    Latex allergy     Limb alert care status     RUE, RLE    Mobility impaired     uses cane    Optic neuritis, left     PONV (postoperative nausea and vomiting)     RSD (reflex sympathetic dystrophy)     CRPS RUE/RLE    Wears glasses        Past Surgical History:   Procedure Laterality Date    IMPLANTATION / PLACEMENT EPIDURAL NEUROSTIMULATOR ELECTRODES Right     KNEE ARTHROSCOPY Right     SC IMPLANT SPINAL NEUROSTIM/ Right 7/18/2017    Procedure: PLACEMENT OF NEW SPINAL COLUMN STIMULATOR IN THE RIGHT BUTTOCK THROUGH SEPERATE INCISION WITH TUNNELING OF LEAD; POSSIBLE PLACEMENT OF EXTENSION;  Surgeon: Екатерина Beach MD;  [facility];  Service: Neurosurgery    SC IMPLANT SPINAL NEUROSTIM/ N/A 4/25/2018    Procedure: Removal of left flank spinal cord stimulator generator, placement of new spinal cord stimulator generator in left buttock through separate incision;  Surgeon: Екатерина Beach MD;  [facility];  Service: Neurosurgery    SC REVISE/REMOVE SPINAL NEUROSTIM/ Right 7/18/2017    Procedure: REMOVAL RIGHT FLANK SPINAL COLUMN STIMULATOR IPG;  Surgeon: Zhang Ro MD;  Location:  MAIN OR;  Service: Neurosurgery    NC SURG IMPLNT [address] N/A 7/14/2016    Procedure: LAMINECTOMY  T10 -6 WITH  PLACEMENT OF  SPINAL CORD STIMULATOR AND PULSE GEBNERATOR  IMPULSE MONITORING ;  Surgeon: Melisa Valadez MD;  [facility];  Service: Orthopedics    TUBAL LIGATION      WISDOM TOOTH EXTRACTION         Family History   Problem Relation Age of Onset    Adopted: Yes     I have reviewed and agree with the history as documented  Social History   Substance Use Topics    Smoking status: Former Smoker     Packs/day: 1 00     Years: 5 00     Quit date: 6/22/2010    Smokeless tobacco: Never Used    Alcohol use No        Review of Systems   Constitutional: Negative for chills and fever  HENT: Negative for sore throat  Eyes: Positive for blurred vision, pain, redness and itching  Negative for visual disturbance  Respiratory: Negative for chest tightness, shortness of breath and wheezing  Cardiovascular: Negative for chest pain  Gastrointestinal: Negative for abdominal pain, blood in stool, constipation, diarrhea, nausea and vomiting  Genitourinary: Negative for dysuria and hematuria  Musculoskeletal: Negative for arthralgias and myalgias  Skin: Negative for color change  Neurological: Negative for light-headedness  Hematological: Negative for adenopathy  Psychiatric/Behavioral: Negative for agitation and behavioral problems  All other systems reviewed and are negative  Physical Exam  ED Triage Vitals [05/19/18 2135]   Temperature Pulse Respirations Blood Pressure SpO2   97 7 °F (36 5 °C) 93 18 132/85 96 %      Temp Source Heart Rate Source Patient Position - Orthostatic VS BP Location FiO2 (%)   Oral Monitor -- -- --      Pain Score       9           Orthostatic Vital Signs  Vitals:    05/19/18 2135   BP: 132/85   Pulse: 93       Physical Exam   Constitutional: She is oriented to person, place, and time   She appears well-developed and well-nourished  No distress  HENT:   Head: Normocephalic and atraumatic  Eyes: EOM and lids are normal  Lids are everted and swept, no foreign bodies found  Right conjunctiva is injected  No scleral icterus  Normal slit-lamp exam including fluorescein staining   Neck: Normal range of motion  Neck supple  Cardiovascular: Normal rate, regular rhythm and normal heart sounds  No murmur heard  Pulmonary/Chest: Effort normal and breath sounds normal  No respiratory distress  Abdominal: Soft  Bowel sounds are normal  There is no tenderness  Musculoskeletal: Normal range of motion  Neurological: She is alert and oriented to person, place, and time  Skin: Skin is warm and dry  Psychiatric: She has a normal mood and affect  Her behavior is normal    Nursing note and vitals reviewed  ED Medications  Medications   proparacaine (ALCAINE) 0 5 % ophthalmic solution 1 drop (1 drop Right Eye Given 5/19/18 2302)   fluorescein sodium sterile ophthalmic strip 1 strip (1 strip Right Eye Given 5/19/18 2302)       Diagnostic Studies  Results Reviewed     None                 No orders to display         Procedures  Procedures      Phone Consults  ED Phone Contact    ED Course  ED Course as of May 20 0123   Sat May 19, 2018   2341 Spoke with Dr Nedra Hernandez, optho, who stated patient could followup with him tomorrow if symptoms persist                                MDM  Number of Diagnoses or Management Options  Conjunctivitis: new and requires workup  Diagnosis management comments:  49-year-old female presenting with conjunctiva injection  Patient had normal exam besides injected conjunctiva discussed patient with her   Ophthalmologist Dr Nedra Hernandez who agreed to plan of follow-up in the office this week         Amount and/or Complexity of Data Reviewed  Review and summarize past medical records: yes  Discuss the patient with other providers: yes  Independent visualization of images, tracings, or specimens: yes      CritCare Time    Disposition  Final diagnoses:   Conjunctivitis     Time reflects when diagnosis was documented in both MDM as applicable and the Disposition within this note     Time User Action Codes Description Comment    5/19/2018 11:33 PM Justin Graves Add [H10 9] Conjunctivitis       ED Disposition     ED Disposition Condition Comment    Discharge  Devinside discharge to home/self care  Condition at discharge: Stable        Follow-up Information     Follow up With Specialties Details Why Contact Info Additional Information    Sukhdev Richardson MD Ophthalmology Schedule an appointment as soon as possible for a visit  [facility] [address]  [address] [address] Emergency Department Emergency Medicine Go to If symptoms worsen [address]  [phone]  ED, [address], [place], [address] [facility] [place], [address]          Discharge Medication List as of 5/19/2018 11:34 PM      CONTINUE these medications which have NOT CHANGED    Details   albuterol (PROVENTIL HFA,VENTOLIN HFA) 90 mcg/act inhaler Inhale 2 puffs as needed for wheezing , Until Discontinued, Historical Med      baclofen 10 mg tablet Take 10 mg by mouth 4 (four) times a day  , Historical Med      DULoxetine (CYMBALTA) 30 mg delayed release capsule Take by mouth 2 (two) times a day  , Historical Med      EPINEPHrine (EPIPEN 2-HERNAN) 0 3 mg/0 3 mL SOAJ Inject 0 3 mg into the shoulder, thigh, or buttocks once, Historical Med      GABAPENTIN PO Take 1,200 mg by mouth 3 (three) times a day, Historical Med      ibuprofen (MOTRIN) 800 mg tablet Take by mouth every 6 (six) hours as needed for mild pain, Historical Med      morphine (MSIR) 15 mg tablet Take 1 tablet (15 mg total) by mouth every 6 (six) hours as needed for severe pain Max Daily Amount: 60 mg, Starting Thu 5/3/2018, Print           No discharge procedures on file      ED Provider  Attending physically available and evaluated Augusta Miranda I managed the patient along with the ED Attending      Electronically Signed by         Remo Soulier, MD  05/20/18 9729

## 2018-05-20 NOTE — ED ATTENDING ATTESTATION
Gilmar Torres MD, saw and evaluated the patient  I have discussed the patient with the resident/non-physician practitioner and agree with the resident's/non-physician practitioner's findings, Plan of Care, and MDM as documented in the resident's/non-physician practitioner's note, except where noted  All available labs and Radiology studies were reviewed  At this point I agree with the current assessment done in the Emergency Department  I have conducted an independent evaluation of this patient a history and physical is as follows:   Pt has history admission for evaluation  of optic neuritis in the past  Pt was seen by ophtho  At that time and because she was so uncomfortable was sent to hospital for admission for evaluation of optic neuritis  She was seen by neurology and mri that was negative and she ws felt to not have optic neuritis  she ws told it was an eye infection  Pt has had pain and itching below r eye Pt has had ha on r side for 2 days no fever s no chills no nv no sob   PE: alert perrl eomi slit lamp no abrasion + conjunctival erythema with mucoid discharge pressure 13 heart reg lungs yuri neuro nonfocla MDM; will discuss with ophtho     Critical Care Time  CritCare Time    Procedures

## 2018-05-20 NOTE — DISCHARGE INSTRUCTIONS

## 2018-06-07 ENCOUNTER — TELEPHONE (OUTPATIENT)
Dept: NEUROSURGERY | Facility: CLINIC | Age: 43
End: 2018-06-07

## 2018-09-02 ENCOUNTER — OFFICE VISIT (OUTPATIENT)
Dept: URGENT CARE | Age: 43
End: 2018-09-02
Payer: COMMERCIAL

## 2018-09-02 VITALS
DIASTOLIC BLOOD PRESSURE: 74 MMHG | HEIGHT: 65 IN | BODY MASS INDEX: 31.65 KG/M2 | TEMPERATURE: 98.2 F | WEIGHT: 190 LBS | SYSTOLIC BLOOD PRESSURE: 119 MMHG | RESPIRATION RATE: 16 BRPM | HEART RATE: 82 BPM | OXYGEN SATURATION: 97 %

## 2018-09-02 DIAGNOSIS — R11.2 NON-INTRACTABLE VOMITING WITH NAUSEA, UNSPECIFIED VOMITING TYPE: Primary | ICD-10-CM

## 2018-09-02 PROCEDURE — 99213 OFFICE O/P EST LOW 20 MIN: CPT | Performed by: NURSE PRACTITIONER

## 2018-09-02 RX ORDER — ONDANSETRON 4 MG/1
4 TABLET, ORALLY DISINTEGRATING ORAL ONCE
Status: COMPLETED | OUTPATIENT
Start: 2018-09-02 | End: 2018-09-02

## 2018-09-02 RX ORDER — ONDANSETRON 4 MG/1
4 TABLET, ORALLY DISINTEGRATING ORAL EVERY 6 HOURS PRN
Qty: 20 TABLET | Refills: 0 | Status: SHIPPED | OUTPATIENT
Start: 2018-09-02 | End: 2018-10-15 | Stop reason: SDUPTHER

## 2018-09-02 RX ADMIN — ONDANSETRON 4 MG: 4 TABLET, ORALLY DISINTEGRATING ORAL at 11:36

## 2018-09-02 NOTE — PROGRESS NOTES
[facility]        NAME: Kate Shukla is a 37 y o  female  : 1975    MRN: [id number]  DATE: 2018  TIME: 11:46 AM    Assessment and Plan   Non-intractable vomiting with nausea, unspecified vomiting type [R11 2]  1  Non-intractable vomiting with nausea, unspecified vomiting type  ondansetron (ZOFRAN-ODT) dispersible tablet 4 mg    ondansetron (ZOFRAN-ODT) 4 mg disintegrating tablet         Patient Instructions     Medication as prescribed / discussed  Maintain hydration  Continue to monitor  Proceed to ED with worsening symptoms or severe abdominal pain  Follow up with PCP in 3-5 days  Proceed to  ER if symptoms worsen  Chief Complaint     Chief Complaint   Patient presents with    Vomiting     since am   no diarrhea; chills with hot flash; No symptoms yesterday         History of Present Illness       42-year-old female presenting today with c/o vomiting starting a few hours ago when she awoke this morning  No fevers but reports chills  +nausea  No diarrhea or constipation  No abdominal pain  Ate [place] food for dinner last night, unsure if this is cause of symptoms - no one else that ate it is sick  Vomiting    Associated symptoms include chills  Pertinent negatives include no abdominal pain or diarrhea  Review of Systems   Review of Systems   Constitutional: Positive for chills  HENT: Negative  Respiratory: Negative  Cardiovascular: Negative  Gastrointestinal: Positive for nausea and vomiting  Negative for abdominal pain, constipation and diarrhea           Current Medications       Current Outpatient Prescriptions:     DULoxetine (CYMBALTA) 30 mg delayed release capsule, Take by mouth 2 (two) times a day  , Disp: , Rfl:     GABAPENTIN PO, Take 1,200 mg by mouth 3 (three) times a day, Disp: , Rfl:     albuterol (PROVENTIL HFA,VENTOLIN HFA) 90 mcg/act inhaler, Inhale 2 puffs as needed for wheezing , Disp: , Rfl:     baclofen 10 mg tablet, Take 10 mg by mouth 4 (four) times a day  , Disp: , Rfl:     EPINEPHrine (EPIPEN 2-HERNAN) 0 3 mg/0 3 mL SOAJ, Inject 0 3 mg into the shoulder, thigh, or buttocks once, Disp: , Rfl:     gabapentin (NEURONTIN) 600 MG tablet, 3 (three) times a day, Disp: , Rfl:     ibuprofen (MOTRIN) 800 mg tablet, Take by mouth every 6 (six) hours as needed for mild pain, Disp: , Rfl:     Influenza Virus Vacc Split PF 0 5 ML ZULAY, inject 0 5 milliliter intramuscularly, Disp: , Rfl:     mometasone-formoterol (DULERA) 100-5 MCG/ACT inhaler, Every 12 hours, Disp: , Rfl:     ondansetron (ZOFRAN-ODT) 4 mg disintegrating tablet, Take 1 tablet (4 mg total) by mouth every 6 (six) hours as needed for nausea or vomiting, Disp: 20 tablet, Rfl: 0  No current facility-administered medications for this visit       Current Allergies     Allergies as of 09/02/2018 - Reviewed 09/02/2018   Allergen Reaction Noted    Bee venom Anaphylaxis 06/22/2016    Codeine Anaphylaxis 04/22/2012    Iodinated diagnostic agents Anaphylaxis 06/22/2016    Latex Anaphylaxis 06/22/2016    Other Anaphylaxis 12/25/2015    Penicillins Anaphylaxis 10/04/2012    Robaxin [methocarbamol] Other (See Comments) 01/09/2017    Shellfish-derived products Anaphylaxis 06/22/2016    Hydrocodone-guaifenesin  04/28/2015    Iodides  04/28/2015    Iodine  04/22/2012    Meperidine  04/22/2012    Oxycodone hcl  04/22/2012    Percocet [oxycodone-acetaminophen]  07/13/2017    Clindamycin Rash 04/24/2018            The following portions of the patient's history were reviewed and updated as appropriate: allergies, current medications, past family history, past medical history, past social history, past surgical history and problem list      Past Medical History:   Diagnosis Date    Anxiety     Asthma     Back pain     Chronic narcotic dependence ([facility] [address] )     CRPS (complex regional pain syndrome type I)     Depression     Fracture     2015 right knee fracture    Latex allergy     Limb alert care status     RUE, RLE    Mobility impaired     uses cane    Optic neuritis, left     PONV (postoperative nausea and vomiting)     RSD (reflex sympathetic dystrophy)     CRPS RUE/RLE    Wears glasses        Past Surgical History:   Procedure Laterality Date    IMPLANTATION / PLACEMENT EPIDURAL NEUROSTIMULATOR ELECTRODES Right     KNEE ARTHROSCOPY Right     DE IMPLANT SPINAL NEUROSTIM/ Right 7/18/2017    Procedure: PLACEMENT OF NEW SPINAL COLUMN STIMULATOR IN THE RIGHT BUTTOCK THROUGH SEPERATE INCISION WITH TUNNELING OF LEAD; POSSIBLE PLACEMENT OF EXTENSION;  Surgeon: Tonio Hernandez MD;  [facility];  Service: Neurosurgery    DE IMPLANT SPINAL NEUROSTIM/ N/A 4/25/2018    Procedure: Removal of left flank spinal cord stimulator generator, placement of new spinal cord stimulator generator in left buttock through separate incision;  Surgeon: Tonio Hernandez MD;  [facility];  Service: Neurosurgery    DE REVISE/REMOVE SPINAL NEUROSTIM/ Right 7/18/2017    Procedure: REMOVAL RIGHT FLANK SPINAL COLUMN STIMULATOR IPG;  Surgeon: Tonio Hernandez MD;  [facility];  Service: Neurosurgery    DE SURG IMPLNT [address] N/A 7/14/2016    Procedure: LAMINECTOMY  T10 -6 WITH  PLACEMENT OF  SPINAL CORD STIMULATOR AND PULSE GEBNERATOR  IMPULSE MONITORING ;  Surgeon: Gabriella Kennedy MD;  [facility];  Service: Orthopedics    TUBAL LIGATION      WISDOM TOOTH EXTRACTION         Family History   Problem Relation Age of Onset    Adopted: Yes         Medications have been verified  Objective   /74   Pulse 82   Temp 98 2 °F (36 8 °C)   Resp 16   Ht 5' 5" (1 651 m)   Wt 86 2 kg (190 lb)   SpO2 97%   BMI 31 62 kg/m²        Physical Exam     Physical Exam   Constitutional: She is oriented to person, place, and time  She appears well-developed and well-nourished  Cardiovascular: Normal rate, regular rhythm and normal heart sounds  Pulmonary/Chest: Effort normal and breath sounds normal    Abdominal: Soft  Bowel sounds are normal  She exhibits no distension  There is no tenderness  Neurological: She is alert and oriented to person, place, and time  Skin: Skin is warm and dry  Nursing note and vitals reviewed

## 2018-09-02 NOTE — PATIENT INSTRUCTIONS
Medication as prescribed / discussed  Maintain hydration  Continue to monitor  Proceed to ED with worsening symptoms or severe abdominal pain  Acute Nausea and Vomiting   AMBULATORY CARE:   Acute nausea and vomiting  starts suddenly, gets worse quickly, and lasts a short time  Common causes include pregnancy, alcohol, infection, and medicines  A head injury, heart attack, or inner ear imbalance can also cause acute nausea and vomiting  Seek care immediately if:   · You see blood in your vomit or your bowel movements  · You have sudden, severe pain in your chest and upper abdomen after hard vomiting or retching  · You have swelling in your neck and chest      · You are dizzy, cold, and thirsty and your eyes and mouth are dry  · You are urinating very little or not at all  · You have muscle weakness, leg cramps, and trouble breathing  · Your heart is beating much faster than normal      · You continue to vomit for more than 48 hours  Contact your healthcare provider if:   · You have frequent dry heaves (vomiting but nothing comes out)  · Your nausea and vomiting does not get better or go away after you use medicine  · You have questions or concerns about your condition or treatment  Treatment for acute nausea and vomiting  may include medicines to calm your stomach and stop the vomiting  You may need IV fluids if you are dehydrated  Prevent or manage acute nausea and vomiting:   · Do not drink alcohol  Alcohol may upset or irritate your stomach  Too much alcohol can also cause acute nausea and vomiting  · Control stress  Headaches due to stress may cause nausea and vomiting  Find ways to relax and manage your stress  Get more rest and sleep  · Drink more liquids as directed  Vomiting can lead to dehydration  It is important to drink more liquids to help replace lost body fluids  Ask your healthcare provider how much liquid to drink each day and which liquids are best for you  Your provider may recommend that you drink an oral rehydration solution (ORS)  ORS contains water, salts, and sugar that are needed to replace the lost body fluids  Ask what kind of ORS to use, how much to drink, and where to get it  · Eat smaller meals, more often  Eat small amounts of food every 2 to 3 hours, even if you are not hungry  Food in your stomach may decrease your nausea  · Talk to your healthcare provider before you take over-the-counter (OTC) medicines  These medicines can cause serious problems if you use certain other medicines, or you have a medical condition  You may have problems if you use too much or use them for longer than the label says  Follow directions on the label carefully  Follow up with your healthcare provider as directed:  Write down your questions so you remember to ask them during your visits  © 2017 [employer] Information is for End User's use only and may not be sold, redistributed or otherwise used for commercial purposes  All illustrations and images included in CareNotes® are the copyrighted property of [employer]  or Aleksey Mosley  The above information is an  only  It is not intended as medical advice for individual conditions or treatments  Talk to your doctor, nurse or pharmacist before following any medical regimen to see if it is safe and effective for you

## 2018-10-15 ENCOUNTER — OFFICE VISIT (OUTPATIENT)
Dept: FAMILY MEDICINE CLINIC | Facility: CLINIC | Age: 43
End: 2018-10-15
Payer: COMMERCIAL

## 2018-10-15 VITALS
HEART RATE: 82 BPM | HEIGHT: 65 IN | WEIGHT: 180 LBS | TEMPERATURE: 98.4 F | BODY MASS INDEX: 29.99 KG/M2 | SYSTOLIC BLOOD PRESSURE: 120 MMHG | DIASTOLIC BLOOD PRESSURE: 80 MMHG | OXYGEN SATURATION: 99 %

## 2018-10-15 DIAGNOSIS — T78.2XXA ANAPHYLAXIS, INITIAL ENCOUNTER: ICD-10-CM

## 2018-10-15 DIAGNOSIS — Z00.01 ENCOUNTER FOR ROUTINE ADULT HEALTH EXAMINATION WITH ABNORMAL FINDINGS: Primary | ICD-10-CM

## 2018-10-15 DIAGNOSIS — F32.0 CURRENT MILD EPISODE OF MAJOR DEPRESSIVE DISORDER WITHOUT PRIOR EPISODE (HCC): ICD-10-CM

## 2018-10-15 DIAGNOSIS — Z12.31 ENCOUNTER FOR MAMMOGRAM TO ESTABLISH BASELINE MAMMOGRAM: ICD-10-CM

## 2018-10-15 PROBLEM — R52 DEAFFERENTATION PAIN: Status: ACTIVE | Noted: 2018-10-15

## 2018-10-15 PROBLEM — G90.50 RSD (REFLEX SYMPATHETIC DYSTROPHY): Status: ACTIVE | Noted: 2017-01-09

## 2018-10-15 PROCEDURE — G0438 PPPS, INITIAL VISIT: HCPCS | Performed by: FAMILY MEDICINE

## 2018-10-15 RX ORDER — EPINEPHRINE 0.3 MG/.3ML
0.3 INJECTION SUBCUTANEOUS ONCE
Qty: 0.3 ML | Refills: 0 | Status: SHIPPED | OUTPATIENT
Start: 2018-10-15 | End: 2019-06-12 | Stop reason: SDUPTHER

## 2018-10-15 RX ORDER — IBUPROFEN 800 MG/1
TABLET ORAL EVERY 6 HOURS PRN
Qty: 30 TABLET | Refills: 0 | Status: CANCELLED | OUTPATIENT
Start: 2018-10-15

## 2018-10-15 NOTE — PATIENT INSTRUCTIONS

## 2018-10-15 NOTE — PROGRESS NOTES
[facility] HEALTH MAINTENANCE OFFICE VISIT  [facility] [place] PRIMARY CARE [facility]    NAME: Neo Hudson  AGE: 37 y o   SEX: female  : 1975     DATE: 10/15/2018    Assessment and Plan     Problem List Items Addressed This Visit     BMI 29 0-29 9,adult     Increased activity totally minute a day 5 days a week will hydration portion control discussed with the patient         Relevant Orders    CBC and differential    Comprehensive metabolic panel    Lipid panel    TSH baseline    Vitamin D 25 hydroxy    Current mild episode of major depressive disorder without prior episode (HCC)     Abnormal depression screening patient had multiple risk factor for depression will refer her to see psychotherapist         Relevant Orders    CBC and differential    Comprehensive metabolic panel    Lipid panel    TSH baseline    Vitamin D 25 hydroxy    Ambulatory referral to Psychiatry      Other Visit Diagnoses     Encounter for routine adult health examination with abnormal findings    -  Primary    Increased activity healthy diet portion control well hydration immunization appropriate for the age discussed with the patient    Relevant Orders    CBC and differential    Comprehensive metabolic panel    Lipid panel    TSH baseline    Vitamin D 25 hydroxy    Encounter for mammogram to establish baseline mammogram        Relevant Orders    Mammo screening bilateral w 3d & cad    Anaphylaxis, initial encounter        Relevant Medications    EPINEPHrine (EPIPEN 2-HERNAN) 0 3 mg/0 3 mL SOAJ    Other Relevant Orders    CBC and differential    Comprehensive metabolic panel    Lipid panel    TSH baseline    Vitamin D 25 hydroxy            · Patient Counseling:   · Nutrition: Stressed importance of a well balanced diet, moderation of sodium/saturated fat, caloric balance and sufficient intake of fiber  · Exercise: Stressed the importance of regular exercise with a goal of 150 minutes per week  · Dental Health: Discussed daily flossing and brushing and regular dental visits     · Immunizations reviewed  Patient is up-to-date with her immunization  · Discussed benefits of screening  The patient did have her Pap smear almost 1 year ago was Normal patient due for her mammogram  · Discussed the patient's BMI with her  The BMI is above average; BMI management plan is completed     Return in about 4 weeks (around 2018)  Chief Complaint     Chief Complaint   Patient presents with    Physical Exam       History of Present Illness     Patient is a new in my office and here for establish care and deny any chest pain short of breath no palpitation no headache no blurred vision no weakness or lateralized of the symptom no abdomen pain nausea vomiting or diarrhea no renal problem no rash no fever no change in the weight patient did quit to smoke more than 10 years ago and she does do regular diet do some exercise in daily basis and she is up-to-date with her Pap smear she did not have a mammogram for while        Well Adult Physical   Patient here for a comprehensive physical exam       Diet and Physical Activity  Diet:  Regular diet  Weight concerns: Patient is overweight (BMI 25 0-29  9)  Exercise: daily      Depression Screen  PHQ-9 Depression Screening    PHQ-9:    Frequency of the following problems over the past two weeks:       Little interest or pleasure in doing things:  0 - not at all  Feeling down, depressed, or hopeless:  1 - several days  Trouble falling or staying asleep, or sleeping too much:  0 - not at all  Feeling tired or having little energy:  1 - several days  Poor appetite or overeatin - not at all  Feeling bad about yourself - or that you are a failure or have let yourself or your family down:  1 - several days  Trouble concentrating on things, such as reading the newspaper or watching television:  1 - several days  Moving or speaking so slowly that other people could have noticed  Or the opposite - being so fidgety or restless that you have been moving around a lot more than usual:  0 - not at all  Thoughts that you would be better off dead, or of hurting yourself in some way:  0 - not at all  PHQ-2 Score:  1  PHQ-9 Score:  4          General Health  Hearing: Normal:  bilateral  Vision: wears glasses  Dental: no dental visits for >1 year          The following portions of the patient's history were reviewed and updated as appropriate: allergies, current medications, past family history, past medical history, past social history, past surgical history and problem list     Review of Systems     Review of Systems   Constitutional: Negative for fatigue and fever  HENT: Negative for ear pain, sinus pain, sinus pressure and sore throat  Eyes: Negative for pain and redness  Respiratory: Negative for cough, chest tightness and shortness of breath  Cardiovascular: Negative for chest pain, palpitations and leg swelling  Gastrointestinal: Negative for abdominal pain, blood in stool, constipation, diarrhea and nausea  Genitourinary: Negative for flank pain, frequency and hematuria  Musculoskeletal: Negative for back pain and joint swelling  Skin: Negative for rash  Neurological: Negative for dizziness, numbness and headaches  Hematological: Does not bruise/bleed easily  Psychiatric/Behavioral: Negative for agitation, behavioral problems and self-injury         Past Medical History     Past Medical History:   Diagnosis Date    Anxiety     Asthma     Back pain     Chronic narcotic dependence ([facility] [address] )     CRPS (complex regional pain syndrome type I)     Depression     Fracture     2015 right knee fracture    Latex allergy     Limb alert care status     RUE, RLE    Mobility impaired     uses cane    Optic neuritis, left     PONV (postoperative nausea and vomiting)     RSD (reflex sympathetic dystrophy)     CRPS RUE/RLE    Wears glasses        Past Surgical History     Past Surgical History:   Procedure Laterality Date    IMPLANTATION / PLACEMENT EPIDURAL NEUROSTIMULATOR ELECTRODES Right     KNEE ARTHROSCOPY Right     KNEE SURGERY Right 2000    VT IMPLANT SPINAL NEUROSTIM/ Right 7/18/2017    Procedure: PLACEMENT OF NEW SPINAL COLUMN STIMULATOR IN THE RIGHT BUTTOCK THROUGH SEPERATE INCISION WITH TUNNELING OF LEAD; POSSIBLE PLACEMENT OF EXTENSION;  Surgeon: Migue Lozano MD;  Location: [facility];  Service: Neurosurgery    VT IMPLANT SPINAL NEUROSTIM/ N/A 4/25/2018    Procedure: Removal of left flank spinal cord stimulator generator, placement of new spinal cord stimulator generator in left buttock through separate incision;  Surgeon: Migue Lozano MD;  [facility];  Service: Neurosurgery    VT REVISE/REMOVE SPINAL NEUROSTIM/ Right 7/18/2017    Procedure: REMOVAL RIGHT FLANK SPINAL COLUMN STIMULATOR IPG;  Surgeon: Migue Lozano MD;  [facility];  Service: Neurosurgery    VT SURG IMPLNT [address] N/A 7/14/2016    Procedure: LAMINECTOMY  T10 -6 WITH  PLACEMENT OF  SPINAL CORD STIMULATOR AND PULSE GEBNERATOR  IMPULSE MONITORING ;  Surgeon: Cristina Ruby MD;  Location: [facility];  Service: Orthopedics    TUBAL LIGATION Bilateral 2003    WISDOM TOOTH EXTRACTION         Social History     Social History     Social History    Marital status: /Civil Union     Spouse name: N/A    Number of children: N/A    Years of education: N/A     Social History Main Topics    Smoking status: Former Smoker     Packs/day: 0 50     Years: 5 00     Types: Cigarettes     Quit date: 6/22/2010    Smokeless tobacco: Former User      Comment: no passive smoke exposure    Alcohol use Yes    Drug use: No    Sexual activity: Not Asked     Other Topics Concern    None     Social History Narrative    None       Family History     Family History   Problem Relation Age of Onset    Adopted: Yes       Current Medications Current Outpatient Prescriptions:     albuterol (PROVENTIL HFA,VENTOLIN HFA) 90 mcg/act inhaler, Inhale 2 puffs as needed for wheezing , Disp: , Rfl:     baclofen 10 mg tablet, Take 10 mg by mouth 4 (four) times a day  , Disp: , Rfl:     DULoxetine (CYMBALTA) 30 mg delayed release capsule, Take by mouth 2 (two) times a day  , Disp: , Rfl:     EPINEPHrine (EPIPEN 2-HERNAN) 0 3 mg/0 3 mL SOAJ, Inject 0 3 mL (0 3 mg total) into a muscle once for 1 dose, Disp: 0 3 mL, Rfl: 0    GABAPENTIN PO, Take 1,200 mg by mouth 3 (three) times a day, Disp: , Rfl:     ibuprofen (MOTRIN) 800 mg tablet, Take by mouth every 6 (six) hours as needed for mild pain, Disp: , Rfl:     mometasone-formoterol (DULERA) 100-5 MCG/ACT inhaler, Every 12 hours, Disp: , Rfl:      Allergies     Allergies   Allergen Reactions    Bee Venom Anaphylaxis    Codeine Anaphylaxis     Reaction Date: 26Apr2011;   Possible other narcotics; pt may take Narco    Iodinated Diagnostic Agents Anaphylaxis    Latex Anaphylaxis    Other Anaphylaxis     Narcotics  the patient states, "I can only take Dilaudid and Toradol"    Penicillins Anaphylaxis     Anaphylaxis    Robaxin [Methocarbamol] Other (See Comments)     Stroke symptoms    Shellfish-Derived Products Anaphylaxis    Hydrocodone-Guaifenesin     Iodides     Iodine      Reaction Date: 26Apr2011;     Meperidine      Reaction Date: 26Apr2011;     Oxycodone Hcl      Reaction Date: 26Apr2011;     Percocet [Oxycodone-Acetaminophen]      From pre-op orders    Clindamycin Rash       Objective     /80   Pulse 82   Temp 98 4 °F (36 9 °C) (Oral)   Ht 5' 5" (1 651 m)   Wt 81 6 kg (180 lb)   LMP 09/25/2018 (Exact Date)   SpO2 99%   Breastfeeding? No   BMI 29 95 kg/m²      Physical Exam   Constitutional: She is oriented to person, place, and time  She appears well-developed and well-nourished  HENT:   Head: Normocephalic     Right Ear: External ear normal    Left Ear: External ear normal  Eyes: Conjunctivae and EOM are normal  Right eye exhibits no discharge  Left eye exhibits no discharge  Neck: No JVD present  Cardiovascular: Normal rate, regular rhythm and normal heart sounds  Exam reveals no gallop  No murmur heard  Pulmonary/Chest: Effort normal  No respiratory distress  She has no wheezes  She has no rales  She exhibits no tenderness  Abdominal: She exhibits no mass  There is no tenderness  There is no rebound  Musculoskeletal: She exhibits no edema or tenderness  Neurological: She is alert and oriented to person, place, and time  Skin: No rash noted  No erythema           Health Maintenance     Health Maintenance   Topic Date Due    MAMMOGRAM  1975    DTaP,Tdap,and Td Vaccines (1 - Tdap) 07/24/2012    INFLUENZA VACCINE  07/01/2018    PAP SMEAR  11/29/2019     Immunization History   Administered Date(s) Administered    Influenza 09/01/2011, 10/30/2014, 10/01/2015, 08/18/2016, 08/26/2017, 10/06/2018    Influenza Split 11/11/2013    Influenza TIV (IM) 10/08/2014    Pneumococcal Conjugate 13-Valent 09/24/2017    Td (adult), Unspecified 07/23/2012       Maritza Peña MD  [address] [place] [address]

## 2018-10-16 NOTE — ASSESSMENT & PLAN NOTE
Increased activity totally minute a day 5 days a week will hydration portion control discussed with the patient

## 2018-10-16 NOTE — ASSESSMENT & PLAN NOTE
Abnormal depression screening patient had multiple risk factor for depression will refer her to see psychotherapist

## 2019-01-31 ENCOUNTER — TRANSCRIBE ORDERS (OUTPATIENT)
Dept: RADIOLOGY | Facility: HOSPITAL | Age: 44
End: 2019-01-31

## 2019-01-31 ENCOUNTER — OFFICE VISIT (OUTPATIENT)
Dept: NEUROSURGERY | Facility: CLINIC | Age: 44
End: 2019-01-31
Payer: COMMERCIAL

## 2019-01-31 ENCOUNTER — HOSPITAL ENCOUNTER (OUTPATIENT)
Dept: RADIOLOGY | Facility: HOSPITAL | Age: 44
Discharge: HOME | End: 2019-01-31
Attending: NEUROLOGICAL SURGERY
Payer: COMMERCIAL

## 2019-01-31 VITALS
SYSTOLIC BLOOD PRESSURE: 118 MMHG | RESPIRATION RATE: 16 BRPM | HEART RATE: 80 BPM | DIASTOLIC BLOOD PRESSURE: 79 MMHG | HEIGHT: 65 IN | TEMPERATURE: 98.1 F | BODY MASS INDEX: 29.32 KG/M2 | WEIGHT: 176 LBS

## 2019-01-31 DIAGNOSIS — G89.4 CHRONIC PAIN SYNDROME: ICD-10-CM

## 2019-01-31 DIAGNOSIS — G90.511 COMPLEX REGIONAL PAIN SYNDROME TYPE 1 OF RIGHT UPPER EXTREMITY: ICD-10-CM

## 2019-01-31 DIAGNOSIS — G89.4 CHRONIC PAIN SYNDROME: Primary | ICD-10-CM

## 2019-01-31 PROCEDURE — 72040 X-RAY EXAM NECK SPINE 2-3 VW: CPT

## 2019-01-31 PROCEDURE — 72100 X-RAY EXAM L-S SPINE 2/3 VWS: CPT

## 2019-01-31 PROCEDURE — 72070 X-RAY EXAM THORAC SPINE 2VWS: CPT

## 2019-01-31 PROCEDURE — 99215 OFFICE O/P EST HI 40 MIN: CPT | Performed by: NEUROLOGICAL SURGERY

## 2019-01-31 RX ORDER — CHLORHEXIDINE GLUCONATE 0.12 MG/ML
15 RINSE ORAL ONCE
Status: CANCELLED | OUTPATIENT
Start: 2019-01-31 | End: 2019-01-31

## 2019-01-31 RX ORDER — VANCOMYCIN HYDROCHLORIDE 1 G/200ML
1000 INJECTION, SOLUTION INTRAVENOUS ONCE
Status: CANCELLED | OUTPATIENT
Start: 2019-01-31 | End: 2019-01-31

## 2019-01-31 NOTE — PROGRESS NOTES
Assessment/Plan:    No problem-specific Assessment & Plan notes found for this encounter  Patient with CRPS and cervical percutaneous and thoracic spinal cord stimulator she wished to have her system removed due to no efficacy and wished to have an MRI in the future if needed  She also has discomfort from the system  I discuss the risks of the procedure including neurologic injury failure to remove the system and in that event she will be unable to get an MRI as well as no treatment of her pain symptoms or worsening pain  She stated she understood these risks and agreed to proceed  Diagnoses and all orders for this visit:    Chronic pain syndrome  -     XR spine cervical 2 or 3 vw injury; Future  -     X-ray thoracic spine 2 views; Future  -     X-ray lumbar spine 2 or 3 views; Future  -     Case request operating room: REMOVAL OF IMPLANTABLE PULSE GENERATOR (IPG) DORSAL SPINAL COLUMN STIMULATOR  (DCS) AND GENERATOR OF CERVICAL AND THORACIC; Standing  -     Case request operating room: REMOVAL OF IMPLANTABLE PULSE GENERATOR (IPG) DORSAL SPINAL COLUMN STIMULATOR  (DCS) AND GENERATOR OF CERVICAL AND THORACIC    Complex regional pain syndrome type 1 of right upper extremity    Other orders  -     Diet NPO; Sips with meds; Standing  -     Void on call to OR; Standing  -     Insert peripheral IV; Standing  -     Nursing Communication Use 2 CHG cloths, have the patient wash his/her body from the neck down or have staff wash entire body (from neck down) if patient is unable; Standing  -     chlorhexidine (PERIDEX) 0 12 % oral rinse 15 mL; Swish and spit 15 mL once   -     vancomycin (VANCOCIN) IVPB (premix) 1,000 mg; Infuse 200 mL (1,000 mg total) into a venous catheter once           Subjective:      Patient ID: Naa Arteaga is a 40 y o  female  Patient with a cervical and thoracic spinal cord stimulator for CRPS of the right and upper and lower extremity   She reports that he system worked initially but now she does not have any efficacy  She has discomfort from the generator and she would like to be able to obtain MRIs in the future if possible  She also states she has had multiple programming sessions  The following portions of the patient's history were reviewed and updated as appropriate:   She  has a past medical history of Anxiety; Asthma; Back pain; Chronic narcotic dependence ([facility] [address] ); CRPS (complex regional pain syndrome type I); Depression; Fracture; Latex allergy; Limb alert care status; Mobility impaired; Optic neuritis, left; PONV (postoperative nausea and vomiting); RSD (reflex sympathetic dystrophy); and Wears glasses  She   Patient Active Problem List    Diagnosis Date Noted    Deafferentation pain 10/15/2018    BMI 29 0-29 9,adult 10/15/2018    Current mild episode of major depressive disorder without prior episode ([facility] [address] ) 10/15/2018    Aftercare following surgery 05/10/2018    Lumbar radiculopathy 03/28/2018    Complex regional pain syndrome type I 07/18/2017    Optic neuritis 01/09/2017    RSD (reflex sympathetic dystrophy) 01/09/2017    Asthma 01/09/2017    Anxiety 09/18/2015    Pityriasis rosea 06/23/2014    Vitamin D deficiency 06/23/2014    Vitamin B12 deficiency 10/08/2012     She  has a past surgical history that includes Knee arthroscopy (Right); Implantation / placement epidural neurostimulator electrodes (Right); Tubal ligation (Bilateral, 2003); [place] tooth extraction; pr surg implnt neuroelect,epidural (N/A, 7/14/2016); pr revise/remove spinal neurostim/ (Right, 7/18/2017); pr implant spinal neurostim/ (Right, 7/18/2017); pr implant spinal neurostim/ (N/A, 4/25/2018); and Knee surgery (Right, 2000)  Her family history is not on file  She was adopted  She  reports that she quit smoking about 8 years ago  Her smoking use included Cigarettes  She has a 2 50 pack-year smoking history   She has quit using smokeless tobacco  She reports that she drinks alcohol  She reports that she does not use drugs  Current Outpatient Prescriptions on File Prior to Visit   Medication Sig    albuterol (PROVENTIL HFA,VENTOLIN HFA) 90 mcg/act inhaler Inhale 2 puffs as needed for wheezing   baclofen 10 mg tablet Take 10 mg by mouth 4 (four) times a day      DULoxetine (CYMBALTA) 30 mg delayed release capsule Take by mouth 2 (two) times a day      EPINEPHrine (EPIPEN 2-HERNAN) 0 3 mg/0 3 mL SOAJ Inject 0 3 mL (0 3 mg total) into a muscle once for 1 dose    [DISCONTINUED] GABAPENTIN PO Take 1,200 mg by mouth 3 (three) times a day    ibuprofen (MOTRIN) 800 mg tablet Take by mouth every 6 (six) hours as needed for mild pain    [DISCONTINUED] mometasone-formoterol (DULERA) 100-5 MCG/ACT inhaler Every 12 hours     No current facility-administered medications on file prior to visit       Review of Systems   Constitutional: Negative  HENT: Negative  Eyes: Negative  Respiratory:        Asthma   Cardiovascular: Negative  Gastrointestinal: Negative  Endocrine: Negative  Genitourinary: Negative  Musculoskeletal:        Discuss removal of cervical and thoracic SCS   Allergic/Immunologic: Negative  Neurological: Negative  Hematological: Negative  Psychiatric/Behavioral: Negative  Objective:      /79 (BP Location: Left arm)   Pulse 80   Temp 98 1 °F (36 7 °C) (Tympanic)   Resp 16   Ht 5' 5" (1 651 m)   Wt 79 8 kg (176 lb)   BMI 29 29 kg/m²          Physical Exam   Constitutional: She is oriented to person, place, and time  She appears well-developed  HENT:   Head: Normocephalic and atraumatic  Pulmonary/Chest: Effort normal    Neurological: She is alert and oriented to person, place, and time  She has normal strength  No cranial nerve deficit

## 2019-01-31 NOTE — H&P (VIEW-ONLY)
Assessment/Plan:    No problem-specific Assessment & Plan notes found for this encounter  Patient with CRPS and cervical percutaneous and thoracic spinal cord stimulator she wished to have her system removed due to no efficacy and wished to have an MRI in the future if needed  She also has discomfort from the system  I discuss the risks of the procedure including neurologic injury failure to remove the system and in that event she will be unable to get an MRI as well as no treatment of her pain symptoms or worsening pain  She stated she understood these risks and agreed to proceed  Diagnoses and all orders for this visit:    Chronic pain syndrome  -     XR spine cervical 2 or 3 vw injury; Future  -     X-ray thoracic spine 2 views; Future  -     X-ray lumbar spine 2 or 3 views; Future  -     Case request operating room: REMOVAL OF IMPLANTABLE PULSE GENERATOR (IPG) DORSAL SPINAL COLUMN STIMULATOR  (DCS) AND GENERATOR OF CERVICAL AND THORACIC; Standing  -     Case request operating room: REMOVAL OF IMPLANTABLE PULSE GENERATOR (IPG) DORSAL SPINAL COLUMN STIMULATOR  (DCS) AND GENERATOR OF CERVICAL AND THORACIC    Complex regional pain syndrome type 1 of right upper extremity    Other orders  -     Diet NPO; Sips with meds; Standing  -     Void on call to OR; Standing  -     Insert peripheral IV; Standing  -     Nursing Communication Use 2 CHG cloths, have the patient wash his/her body from the neck down or have staff wash entire body (from neck down) if patient is unable; Standing  -     chlorhexidine (PERIDEX) 0 12 % oral rinse 15 mL; Swish and spit 15 mL once   -     vancomycin (VANCOCIN) IVPB (premix) 1,000 mg; Infuse 200 mL (1,000 mg total) into a venous catheter once           Subjective:      Patient ID: Sebastián Sheffield is a 40 y o  female  Patient with a cervical and thoracic spinal cord stimulator for CRPS of the right and upper and lower extremity   She reports that he system worked initially but now she does not have any efficacy  She has discomfort from the generator and she would like to be able to obtain MRIs in the future if possible  She also states she has had multiple programming sessions  The following portions of the patient's history were reviewed and updated as appropriate:   She  has a past medical history of Anxiety; Asthma; Back pain; Chronic narcotic dependence ([facility] [address] ); CRPS (complex regional pain syndrome type I); Depression; Fracture; Latex allergy; Limb alert care status; Mobility impaired; Optic neuritis, left; PONV (postoperative nausea and vomiting); RSD (reflex sympathetic dystrophy); and Wears glasses  She   Patient Active Problem List    Diagnosis Date Noted    Deafferentation pain 10/15/2018    BMI 29 0-29 9,adult 10/15/2018    Current mild episode of major depressive disorder without prior episode ([facility] [address] ) 10/15/2018    Aftercare following surgery 05/10/2018    Lumbar radiculopathy 03/28/2018    Complex regional pain syndrome type I 07/18/2017    Optic neuritis 01/09/2017    RSD (reflex sympathetic dystrophy) 01/09/2017    Asthma 01/09/2017    Anxiety 09/18/2015    Pityriasis rosea 06/23/2014    Vitamin D deficiency 06/23/2014    Vitamin B12 deficiency 10/08/2012     She  has a past surgical history that includes Knee arthroscopy (Right); Implantation / placement epidural neurostimulator electrodes (Right); Tubal ligation (Bilateral, 2003); [place] tooth extraction; pr surg implnt neuroelect,epidural (N/A, 7/14/2016); pr revise/remove spinal neurostim/ (Right, 7/18/2017); pr implant spinal neurostim/ (Right, 7/18/2017); pr implant spinal neurostim/ (N/A, 4/25/2018); and Knee surgery (Right, 2000)  Her family history is not on file  She was adopted  She  reports that she quit smoking about 8 years ago  Her smoking use included Cigarettes  She has a 2 50 pack-year smoking history   She has quit using smokeless tobacco  She reports that she drinks alcohol  She reports that she does not use drugs  Current Outpatient Prescriptions on File Prior to Visit   Medication Sig    albuterol (PROVENTIL HFA,VENTOLIN HFA) 90 mcg/act inhaler Inhale 2 puffs as needed for wheezing   baclofen 10 mg tablet Take 10 mg by mouth 4 (four) times a day      DULoxetine (CYMBALTA) 30 mg delayed release capsule Take by mouth 2 (two) times a day      EPINEPHrine (EPIPEN 2-HERNAN) 0 3 mg/0 3 mL SOAJ Inject 0 3 mL (0 3 mg total) into a muscle once for 1 dose    [DISCONTINUED] GABAPENTIN PO Take 1,200 mg by mouth 3 (three) times a day    ibuprofen (MOTRIN) 800 mg tablet Take by mouth every 6 (six) hours as needed for mild pain    [DISCONTINUED] mometasone-formoterol (DULERA) 100-5 MCG/ACT inhaler Every 12 hours     No current facility-administered medications on file prior to visit       Review of Systems   Constitutional: Negative  HENT: Negative  Eyes: Negative  Respiratory:        Asthma   Cardiovascular: Negative  Gastrointestinal: Negative  Endocrine: Negative  Genitourinary: Negative  Musculoskeletal:        Discuss removal of cervical and thoracic SCS   Allergic/Immunologic: Negative  Neurological: Negative  Hematological: Negative  Psychiatric/Behavioral: Negative  Objective:      /79 (BP Location: Left arm)   Pulse 80   Temp 98 1 °F (36 7 °C) (Tympanic)   Resp 16   Ht 5' 5" (1 651 m)   Wt 79 8 kg (176 lb)   BMI 29 29 kg/m²          Physical Exam   Constitutional: She is oriented to person, place, and time  She appears well-developed  HENT:   Head: Normocephalic and atraumatic  Pulmonary/Chest: Effort normal    Neurological: She is alert and oriented to person, place, and time  She has normal strength  No cranial nerve deficit

## 2019-02-01 ENCOUNTER — LAB (OUTPATIENT)
Dept: LAB | Age: 44
End: 2019-02-01
Payer: COMMERCIAL

## 2019-02-01 DIAGNOSIS — G90.511 COMPLEX REGIONAL PAIN SYNDROME TYPE 1 OF RIGHT UPPER EXTREMITY: ICD-10-CM

## 2019-02-01 DIAGNOSIS — Z00.01 ENCOUNTER FOR ROUTINE ADULT HEALTH EXAMINATION WITH ABNORMAL FINDINGS: ICD-10-CM

## 2019-02-01 DIAGNOSIS — T78.2XXA ANAPHYLAXIS, INITIAL ENCOUNTER: ICD-10-CM

## 2019-02-01 DIAGNOSIS — F32.0 CURRENT MILD EPISODE OF MAJOR DEPRESSIVE DISORDER WITHOUT PRIOR EPISODE (HCC): ICD-10-CM

## 2019-02-01 DIAGNOSIS — G89.4 CHRONIC PAIN SYNDROME: ICD-10-CM

## 2019-02-01 LAB
25(OH)D3 SERPL-MCNC: 17.9 NG/ML (ref 30–100)
ABO GROUP BLD: NORMAL
ALBUMIN SERPL BCP-MCNC: 3.9 G/DL (ref 3.5–5)
ALP SERPL-CCNC: 81 U/L (ref 46–116)
ALT SERPL W P-5'-P-CCNC: 26 U/L (ref 12–78)
ANION GAP SERPL CALCULATED.3IONS-SCNC: 5 MMOL/L (ref 4–13)
APTT PPP: 27 SECONDS (ref 26–38)
AST SERPL W P-5'-P-CCNC: 12 U/L (ref 5–45)
B-HCG SERPL-ACNC: <2 MIU/ML
BASOPHILS # BLD AUTO: 0.01 THOUSANDS/ΜL (ref 0–0.1)
BASOPHILS NFR BLD AUTO: 0 % (ref 0–1)
BILIRUB SERPL-MCNC: 0.25 MG/DL (ref 0.2–1)
BILIRUB UR QL STRIP: NEGATIVE
BLD GP AB SCN SERPL QL: NEGATIVE
BUN SERPL-MCNC: 14 MG/DL (ref 5–25)
CALCIUM SERPL-MCNC: 8.5 MG/DL (ref 8.3–10.1)
CHLORIDE SERPL-SCNC: 108 MMOL/L (ref 100–108)
CHOLEST SERPL-MCNC: 200 MG/DL (ref 50–200)
CLARITY UR: NORMAL
CO2 SERPL-SCNC: 26 MMOL/L (ref 21–32)
COLOR UR: YELLOW
CREAT SERPL-MCNC: 0.83 MG/DL (ref 0.6–1.3)
EOSINOPHIL # BLD AUTO: 0.06 THOUSAND/ΜL (ref 0–0.61)
EOSINOPHIL NFR BLD AUTO: 1 % (ref 0–6)
ERYTHROCYTE [DISTWIDTH] IN BLOOD BY AUTOMATED COUNT: 14.5 % (ref 11.6–15.1)
EST. AVERAGE GLUCOSE BLD GHB EST-MCNC: 111 MG/DL
GFR SERPL CREATININE-BSD FRML MDRD: 86 ML/MIN/1.73SQ M
GLUCOSE P FAST SERPL-MCNC: 93 MG/DL (ref 65–99)
GLUCOSE UR STRIP-MCNC: NEGATIVE MG/DL
HBA1C MFR BLD: 5.5 % (ref 4.2–6.3)
HCT VFR BLD AUTO: 43.9 % (ref 34.8–46.1)
HDLC SERPL-MCNC: 74 MG/DL (ref 40–60)
HGB BLD-MCNC: 13.5 G/DL (ref 11.5–15.4)
HGB UR QL STRIP.AUTO: NEGATIVE
IMM GRANULOCYTES # BLD AUTO: 0.02 THOUSAND/UL (ref 0–0.2)
IMM GRANULOCYTES NFR BLD AUTO: 0 % (ref 0–2)
INR PPP: 0.92 (ref 0.86–1.17)
KETONES UR STRIP-MCNC: NEGATIVE MG/DL
LDLC SERPL CALC-MCNC: 103 MG/DL (ref 0–100)
LEUKOCYTE ESTERASE UR QL STRIP: NEGATIVE
LYMPHOCYTES # BLD AUTO: 1.79 THOUSANDS/ΜL (ref 0.6–4.47)
LYMPHOCYTES NFR BLD AUTO: 28 % (ref 14–44)
MCH RBC QN AUTO: 28 PG (ref 26.8–34.3)
MCHC RBC AUTO-ENTMCNC: 30.8 G/DL (ref 31.4–37.4)
MCV RBC AUTO: 91 FL (ref 82–98)
MONOCYTES # BLD AUTO: 0.41 THOUSAND/ΜL (ref 0.17–1.22)
MONOCYTES NFR BLD AUTO: 6 % (ref 4–12)
NEUTROPHILS # BLD AUTO: 4.14 THOUSANDS/ΜL (ref 1.85–7.62)
NEUTS SEG NFR BLD AUTO: 65 % (ref 43–75)
NITRITE UR QL STRIP: NEGATIVE
NONHDLC SERPL-MCNC: 126 MG/DL
NRBC BLD AUTO-RTO: 0 /100 WBCS
PH UR STRIP.AUTO: 7 [PH] (ref 4.5–8)
PLATELET # BLD AUTO: 232 THOUSANDS/UL (ref 149–390)
PMV BLD AUTO: 12.1 FL (ref 8.9–12.7)
POTASSIUM SERPL-SCNC: 4.6 MMOL/L (ref 3.5–5.3)
PROT SERPL-MCNC: 7 G/DL (ref 6.4–8.2)
PROT UR STRIP-MCNC: NEGATIVE MG/DL
PROTHROMBIN TIME: 12.5 SECONDS (ref 11.8–14.2)
RBC # BLD AUTO: 4.83 MILLION/UL (ref 3.81–5.12)
RH BLD: POSITIVE
SODIUM SERPL-SCNC: 139 MMOL/L (ref 136–145)
SP GR UR STRIP.AUTO: 1.02 (ref 1–1.03)
SPECIMEN EXPIRATION DATE: NORMAL
TRIGL SERPL-MCNC: 117 MG/DL
TSH SERPL DL<=0.05 MIU/L-ACNC: 0.6 UIU/ML (ref 0.36–3.74)
UROBILINOGEN UR QL STRIP.AUTO: 0.2 E.U./DL
WBC # BLD AUTO: 6.43 THOUSAND/UL (ref 4.31–10.16)

## 2019-02-01 PROCEDURE — 86901 BLOOD TYPING SEROLOGIC RH(D): CPT

## 2019-02-01 PROCEDURE — 85025 COMPLETE CBC W/AUTO DIFF WBC: CPT

## 2019-02-01 PROCEDURE — 85610 PROTHROMBIN TIME: CPT

## 2019-02-01 PROCEDURE — 86850 RBC ANTIBODY SCREEN: CPT

## 2019-02-01 PROCEDURE — 80061 LIPID PANEL: CPT

## 2019-02-01 PROCEDURE — 82306 VITAMIN D 25 HYDROXY: CPT

## 2019-02-01 PROCEDURE — 84443 ASSAY THYROID STIM HORMONE: CPT

## 2019-02-01 PROCEDURE — 86900 BLOOD TYPING SEROLOGIC ABO: CPT

## 2019-02-01 PROCEDURE — 85730 THROMBOPLASTIN TIME PARTIAL: CPT

## 2019-02-01 PROCEDURE — 80053 COMPREHEN METABOLIC PANEL: CPT

## 2019-02-01 PROCEDURE — 83036 HEMOGLOBIN GLYCOSYLATED A1C: CPT

## 2019-02-01 PROCEDURE — 81003 URINALYSIS AUTO W/O SCOPE: CPT | Performed by: NEUROLOGICAL SURGERY

## 2019-02-01 PROCEDURE — 84702 CHORIONIC GONADOTROPIN TEST: CPT

## 2019-02-01 PROCEDURE — 36415 COLL VENOUS BLD VENIPUNCTURE: CPT

## 2019-02-04 ENCOUNTER — TELEPHONE (OUTPATIENT)
Dept: FAMILY MEDICINE CLINIC | Facility: CLINIC | Age: 44
End: 2019-02-04

## 2019-02-06 ENCOUNTER — TELEPHONE (OUTPATIENT)
Dept: NEUROSURGERY | Facility: CLINIC | Age: 44
End: 2019-02-06

## 2019-02-06 ENCOUNTER — OFFICE VISIT (OUTPATIENT)
Dept: FAMILY MEDICINE CLINIC | Facility: CLINIC | Age: 44
End: 2019-02-06
Payer: COMMERCIAL

## 2019-02-06 VITALS
HEART RATE: 94 BPM | BODY MASS INDEX: 27.47 KG/M2 | TEMPERATURE: 99.5 F | SYSTOLIC BLOOD PRESSURE: 124 MMHG | WEIGHT: 175 LBS | DIASTOLIC BLOOD PRESSURE: 90 MMHG | OXYGEN SATURATION: 98 % | HEIGHT: 67 IN

## 2019-02-06 DIAGNOSIS — E55.9 VITAMIN D DEFICIENCY: Primary | ICD-10-CM

## 2019-02-06 DIAGNOSIS — R23.8 EASY BRUISING: ICD-10-CM

## 2019-02-06 DIAGNOSIS — J45.20 MILD INTERMITTENT ASTHMA WITHOUT COMPLICATION: ICD-10-CM

## 2019-02-06 DIAGNOSIS — Z01.818 PRE-OP EXAMINATION: ICD-10-CM

## 2019-02-06 PROBLEM — R23.3 EASY BRUISING: Status: ACTIVE | Noted: 2019-02-06

## 2019-02-06 PROBLEM — F32.0 CURRENT MILD EPISODE OF MAJOR DEPRESSIVE DISORDER WITHOUT PRIOR EPISODE (HCC): Status: RESOLVED | Noted: 2018-10-15 | Resolved: 2019-02-06

## 2019-02-06 PROCEDURE — 99214 OFFICE O/P EST MOD 30 MIN: CPT | Performed by: FAMILY MEDICINE

## 2019-02-06 PROCEDURE — 3008F BODY MASS INDEX DOCD: CPT | Performed by: FAMILY MEDICINE

## 2019-02-06 RX ORDER — GABAPENTIN 600 MG/1
1200 TABLET ORAL 3 TIMES DAILY
COMMUNITY
End: 2019-06-12 | Stop reason: SDUPTHER

## 2019-02-06 RX ORDER — ERGOCALCIFEROL 1.25 MG/1
50000 CAPSULE ORAL WEEKLY
Qty: 4 CAPSULE | Refills: 2 | Status: SHIPPED | OUTPATIENT
Start: 2019-02-06

## 2019-02-06 NOTE — TELEPHONE ENCOUNTER
Completed 1/31-----   Signed surgical consent in the presence of surgeon after procedure explained: Procedure: REMOVAL OF IMPLANTABLE PULSE GENERATOR (IPG) DORSAL SPINAL COLUMN STIMULATOR  (DCS) AND GENERATOR OF CERVICAL AND THORACIC (NEUROMONITORING) (Bilateral Spine Thoracic) for 2/15/2018     Assessment for comorbid medical conditions:   HO adverse response to general anesthesia:nausea and vomiting   Cardiac:Denies     Pulmonary:stopped smoking 10 years ago , smoked 1 5 ppd  Since age 13    Endocrine:  Denies   MISC/Oncology :denies   Anticoagulant/Antiplatelet use etc : denies   Personal history of venous thromboembolic disease: denies   Imagining: ordered 1/31 xray cervical  Thoracici and lumbar spine --images viewed in PACS   Pain management: DR Maricarmen Melchor   Gabapentin  And Cymbalta   And baclofen   Medication hold list for surgery (AC, ASA, NSAID, vitamins, dietary supplements, OTC): reviewed in detial   Discussed overview of surgical process from office appointment thru 6 weeks post   Patient/SO verbalized and understanding

## 2019-02-06 NOTE — TELEPHONE ENCOUNTER
Patient telephoned today reports approximately 2 days ago she sustained a large bruise on her breast after bumping and object in the garage  She visited PCP yesterday for surgery clearance ----not given until f/u w/ Hematology /oncology secondary to easily bruising and abn vitamin D level progressive decrease over 3 years  24 3-->17 9  Hematology Consult ordered for  2/18 , need to cancel NSX w/ Dr Shyann Graves scheduled on 2/15 until after clearance from medicine and hematology  Spoke w/ surgery schedule reschedule surgery for March 1 2019 TYLER Quinn , updated patient   Explained Vitamin D deficiency affects blood clotting and she is having major NSX surgery--need to assess for pathology  She verbalized and understanding

## 2019-02-06 NOTE — PROGRESS NOTES
Subjective:   Chief Complaint   Patient presents with    Follow-up     chronic conditions    Pre-op Exam        Patient ID: Galina Taveras is a 40 y o  female  Patient here follow-up with a chronic condition and for preop clearance patient who had a chronic low back pain she had stimulator in patient planned to notice to monitor and the couple weeks patient deny any chest pain short of breath no palpitation no headache no blurred vision no weakness or lateralized of the symptom  The patient no head history of has more deny any cough no chest pain no short of breath no wheezing no fever and the patient using albuterol inhaler as needed she deny and no recent exacerbation no recent hospitalization the patient today concerned about the and easy bruising in she noticed discoloration of the skin on her upper chest the a after she was leaning on the desk per patient she had a cut on her head she bleed for while and no family history of bleeding disorder  Recent blood work discussed with the patient vitamin-D is very low patient asymptomatic no muscle pain and no joint pain        The following portions of the patient's history were reviewed and updated as appropriate: allergies, current medications, past family history, past medical history, past social history, past surgical history and problem list     Review of Systems   Constitutional: Negative for fatigue and fever  HENT: Negative for ear pain, sinus pain, sinus pressure and sore throat  Eyes: Negative for pain and redness  Respiratory: Negative for cough, chest tightness and shortness of breath  Cardiovascular: Negative for chest pain, palpitations and leg swelling  Gastrointestinal: Negative for abdominal pain, blood in stool, constipation, diarrhea and nausea  Genitourinary: Negative for flank pain, frequency and hematuria  Musculoskeletal: Negative for back pain and joint swelling  Skin: Negative for rash     Neurological: Negative for dizziness, numbness and headaches  Hematological: Does not bruise/bleed easily  Objective:  Vitals:    02/06/19 1042   BP: 124/90   Pulse: 94   Temp: 99 5 °F (37 5 °C)   TempSrc: Oral   SpO2: 98%   Weight: 79 4 kg (175 lb)   Height: 5' 6 5" (1 689 m)      Physical Exam   Constitutional: She is oriented to person, place, and time  She appears well-developed and well-nourished  HENT:   Head: Normocephalic  Right Ear: External ear normal    Left Ear: External ear normal    Eyes: Conjunctivae and EOM are normal  Right eye exhibits no discharge  Left eye exhibits no discharge  Neck: No JVD present  Cardiovascular: Normal rate, regular rhythm and normal heart sounds  Exam reveals no gallop  No murmur heard  Pulmonary/Chest: Effort normal  No respiratory distress  She has no wheezes  She has no rales  She exhibits no tenderness  Abdominal: She exhibits no mass  There is no tenderness  There is no rebound  Musculoskeletal: She exhibits no edema or tenderness  Neurological: She is alert and oriented to person, place, and time  Skin: No rash noted  No erythema           Assessment/Plan:    Vitamin D deficiency  Chronic asymptomatic uncontrolled start patient on vitamin-D 24529 International Units once a week for 12 week proper use of medication possible side effect discussed with the patient    Easy bruising  Symptomatic platelet is normal CBC is normal will refer the patient to see Hematology discussed with the patient avoid aspirin and avoid nonsteroidal anti-inflammatory drugs    Asthma  Chronic asymptomatic fair controlled no recent exacerbation no recent hospitalization patient to continue using the albuterol as needed    Pre-op examination  Have a procedure to remove the stimulator from her lumbar spine she low risk patient the patient will hold nonsteroidal anti-inflammatory drugs for 7 days before the procedure       Diagnoses and all orders for this visit:    Vitamin D deficiency  - ergocalciferol (VITAMIN D2) 50,000 units; Take 1 capsule (50,000 Units total) by mouth once a week  -     Cancel: Vitamin D 25 hydroxy; Future  -     Vitamin D 25 hydroxy; Future    Easy bruising  -     Ambulatory referral to Hematology / Oncology;  Future    Mild intermittent asthma without complication    Pre-op examination    Other orders  -     gabapentin (NEURONTIN) 600 MG tablet;

## 2019-02-08 PROBLEM — Z01.818 PRE-OP EXAMINATION: Status: ACTIVE | Noted: 2019-02-08

## 2019-02-09 NOTE — ASSESSMENT & PLAN NOTE
Chronic asymptomatic uncontrolled start patient on vitamin-D 29387 International Units once a week for 12 week proper use of medication possible side effect discussed with the patient

## 2019-02-09 NOTE — ASSESSMENT & PLAN NOTE
Symptomatic platelet is normal CBC is normal will refer the patient to see Hematology discussed with the patient avoid aspirin and avoid nonsteroidal anti-inflammatory drugs

## 2019-02-09 NOTE — ASSESSMENT & PLAN NOTE
Chronic asymptomatic fair controlled no recent exacerbation no recent hospitalization patient to continue using the albuterol as needed

## 2019-02-15 ENCOUNTER — TELEPHONE (OUTPATIENT)
Dept: FAMILY MEDICINE CLINIC | Facility: CLINIC | Age: 44
End: 2019-02-15

## 2019-02-15 DIAGNOSIS — R23.8 EASY BRUISING: Primary | ICD-10-CM

## 2019-02-15 NOTE — TELEPHONE ENCOUNTER
Notified patient faxed records and referral to [facility] hematology oncology spoke with office they will have physician review records and contact patient to schedule

## 2019-02-22 NOTE — PRE-PROCEDURE INSTRUCTIONS
Pre-Surgery Instructions:   Medication Instructions    albuterol (PROVENTIL HFA,VENTOLIN HFA) 90 mcg/act inhaler Instructed patient per Anesthesia Guidelines   baclofen 10 mg tablet Instructed patient per Anesthesia Guidelines   DULoxetine (CYMBALTA) 30 mg delayed release capsule Instructed patient per Anesthesia Guidelines   EPINEPHrine (EPIPEN 2-HERNAN) 0 3 mg/0 3 mL SOAJ Instructed patient per Anesthesia Guidelines   ergocalciferol (VITAMIN D2) 50,000 units Instructed patient per Anesthesia Guidelines   gabapentin (NEURONTIN) 600 MG tablet Instructed patient per Anesthesia Guidelines      Pre op and showering instructions per Dr Caren rosenberg soap/wipes-Instructed to bring  DOS

## 2019-02-28 ENCOUNTER — TELEPHONE (OUTPATIENT)
Dept: NEUROSURGERY | Facility: CLINIC | Age: 44
End: 2019-02-28

## 2019-02-28 ENCOUNTER — DOCUMENTATION (OUTPATIENT)
Dept: NEUROSURGERY | Facility: CLINIC | Age: 44
End: 2019-02-28

## 2019-02-28 ENCOUNTER — ANESTHESIA EVENT (OUTPATIENT)
Dept: PERIOP | Facility: HOSPITAL | Age: 44
End: 2019-02-28
Payer: COMMERCIAL

## 2019-02-28 DIAGNOSIS — Z98.890 POSTOPERATIVE STATE: Primary | ICD-10-CM

## 2019-02-28 DIAGNOSIS — G89.18 ACUTE POSTOPERATIVE PAIN: ICD-10-CM

## 2019-02-28 RX ORDER — MORPHINE SULFATE 15 MG/1
TABLET ORAL
Qty: 30 TABLET | Refills: 0 | Status: SHIPPED | OUTPATIENT
Start: 2019-02-28 | End: 2019-06-12 | Stop reason: SDUPTHER

## 2019-02-28 NOTE — TELEPHONE ENCOUNTER
Pre operative call day prior surgery scheduled in the AM w/ Dr Melissa Roa Procedure: Familia Rosen (IPG) DORSAL SPINAL COLUMN STIMULATOR  (DCS) AND GENERATOR OF CERVICAL AND THORACIC (NEUROMONITORING) (Bilateral Spine Thoracic)---3/43172--    Discussion/Review    Allergies ---Reviewed   Hold medications --- Reviewed as per medication hold list   NPO after MN, night prior surgery ---Reviewed  Medication (s) instructed by healthcare provider to take the morning of surgery w/ sip of water 4 OZ discussed:as per ASU nurse   Post operative scripts electronic transmission: Morphine ----denies adverse effect --prior surgey in 2018  Medication prescribed without adverse effect  PDMP site reviewed accessed and reviewed scheduled drug list printed and scanned into record  Pain management script: morphine refer above -no reported adverse effect taken in past    Has Baclofen encourage take every 6 hours form first 3-4 days postoperatively for thoracic and cervical paraspinal muscle spasm  Pre- operative shower protocol reviewed; Clarify instructions as per protocol, third chlorhexidine shower tonight before surgery, then use NELSON wipes as per packaging instructions, Use a clean towel and wash cloth starting tonight and continue nightly until seen 2 weeks post operative visit for incision check removal  Change bed linens tonight and continue at least 1-2 times weekly  --reinforced     Informed will receive a telephone call tonight from a hospital representative with time to report on surgery day: --reinforced   Informed will receive a f/u call within in 24 -48 hours post-op to assess recovery reinforce instructions, and to answer any questions  --reinforced   Follow-up appointments reviewed 2 week 3/15 /2019    6 week 4/4 2019   Patient verbalized understanding information provided /discussed

## 2019-03-01 ENCOUNTER — APPOINTMENT (OUTPATIENT)
Dept: RADIOLOGY | Facility: HOSPITAL | Age: 44
End: 2019-03-01
Payer: COMMERCIAL

## 2019-03-01 ENCOUNTER — HOSPITAL ENCOUNTER (OUTPATIENT)
Facility: HOSPITAL | Age: 44
Setting detail: OUTPATIENT SURGERY
Discharge: HOME | End: 2019-03-01
Attending: NEUROLOGICAL SURGERY | Admitting: NEUROLOGICAL SURGERY
Payer: COMMERCIAL

## 2019-03-01 ENCOUNTER — ANESTHESIA (OUTPATIENT)
Dept: PERIOP | Facility: HOSPITAL | Age: 44
End: 2019-03-01
Payer: COMMERCIAL

## 2019-03-01 VITALS
RESPIRATION RATE: 18 BRPM | OXYGEN SATURATION: 93 % | DIASTOLIC BLOOD PRESSURE: 74 MMHG | BODY MASS INDEX: 28.12 KG/M2 | TEMPERATURE: 99.2 F | WEIGHT: 175 LBS | SYSTOLIC BLOOD PRESSURE: 124 MMHG | HEART RATE: 81 BPM | HEIGHT: 66 IN

## 2019-03-01 LAB
EXT PREGNANCY TEST URINE: NEGATIVE
GLUCOSE SERPL-MCNC: 112 MG/DL (ref 65–140)
GLUCOSE SERPL-MCNC: 125 MG/DL (ref 65–140)

## 2019-03-01 PROCEDURE — 82948 REAGENT STRIP/BLOOD GLUCOSE: CPT

## 2019-03-01 PROCEDURE — 63688 REV/RMV IMP SP NPG/R DTCH CN: CPT | Performed by: NEUROLOGICAL SURGERY

## 2019-03-01 PROCEDURE — 63662 REMOVE SPINE ELTRD PLATE: CPT | Performed by: NEUROLOGICAL SURGERY

## 2019-03-01 PROCEDURE — 81025 URINE PREGNANCY TEST: CPT | Performed by: STUDENT IN AN ORGANIZED HEALTH CARE EDUCATION/TRAINING PROGRAM

## 2019-03-01 PROCEDURE — 74018 RADEX ABDOMEN 1 VIEW: CPT

## 2019-03-01 PROCEDURE — 63661 REMOVE SPINE ELTRD PERQ ARAY: CPT | Performed by: NEUROLOGICAL SURGERY

## 2019-03-01 RX ORDER — CHLORHEXIDINE GLUCONATE 0.12 MG/ML
15 RINSE ORAL ONCE
Status: COMPLETED | OUTPATIENT
Start: 2019-03-01 | End: 2019-03-01

## 2019-03-01 RX ORDER — KETAMINE HYDROCHLORIDE 50 MG/ML
INJECTION, SOLUTION, CONCENTRATE INTRAMUSCULAR; INTRAVENOUS AS NEEDED
Status: DISCONTINUED | OUTPATIENT
Start: 2019-03-01 | End: 2019-03-01 | Stop reason: SURG

## 2019-03-01 RX ORDER — MORPHINE SULFATE 15 MG/1
15 TABLET ORAL EVERY 4 HOURS PRN
Status: DISCONTINUED | OUTPATIENT
Start: 2019-03-01 | End: 2019-03-01 | Stop reason: HOSPADM

## 2019-03-01 RX ORDER — VANCOMYCIN HYDROCHLORIDE 1 G/200ML
1000 INJECTION, SOLUTION INTRAVENOUS ONCE
Status: COMPLETED | OUTPATIENT
Start: 2019-03-01 | End: 2019-03-01

## 2019-03-01 RX ORDER — SCOLOPAMINE TRANSDERMAL SYSTEM 1 MG/1
1 PATCH, EXTENDED RELEASE TRANSDERMAL
Status: DISCONTINUED | OUTPATIENT
Start: 2019-03-01 | End: 2019-03-01 | Stop reason: HOSPADM

## 2019-03-01 RX ORDER — MAGNESIUM HYDROXIDE 1200 MG/15ML
LIQUID ORAL AS NEEDED
Status: DISCONTINUED | OUTPATIENT
Start: 2019-03-01 | End: 2019-03-01 | Stop reason: HOSPADM

## 2019-03-01 RX ORDER — ONDANSETRON 2 MG/ML
INJECTION INTRAMUSCULAR; INTRAVENOUS AS NEEDED
Status: DISCONTINUED | OUTPATIENT
Start: 2019-03-01 | End: 2019-03-01 | Stop reason: SURG

## 2019-03-01 RX ORDER — ROCURONIUM BROMIDE 10 MG/ML
INJECTION, SOLUTION INTRAVENOUS AS NEEDED
Status: DISCONTINUED | OUTPATIENT
Start: 2019-03-01 | End: 2019-03-01 | Stop reason: SURG

## 2019-03-01 RX ORDER — NEOSTIGMINE METHYLSULFATE 1 MG/ML
INJECTION INTRAVENOUS AS NEEDED
Status: DISCONTINUED | OUTPATIENT
Start: 2019-03-01 | End: 2019-03-01 | Stop reason: SURG

## 2019-03-01 RX ORDER — ONDANSETRON 2 MG/ML
4 INJECTION INTRAMUSCULAR; INTRAVENOUS ONCE AS NEEDED
Status: COMPLETED | OUTPATIENT
Start: 2019-03-01 | End: 2019-03-01

## 2019-03-01 RX ORDER — HYDROMORPHONE HCL/PF 1 MG/ML
0.2 SYRINGE (ML) INJECTION
Status: DISCONTINUED | OUTPATIENT
Start: 2019-03-01 | End: 2019-03-01 | Stop reason: HOSPADM

## 2019-03-01 RX ORDER — FENTANYL CITRATE/PF 50 MCG/ML
25 SYRINGE (ML) INJECTION
Status: DISCONTINUED | OUTPATIENT
Start: 2019-03-01 | End: 2019-03-01 | Stop reason: HOSPADM

## 2019-03-01 RX ORDER — GLYCOPYRROLATE 0.2 MG/ML
INJECTION INTRAMUSCULAR; INTRAVENOUS AS NEEDED
Status: DISCONTINUED | OUTPATIENT
Start: 2019-03-01 | End: 2019-03-01 | Stop reason: SURG

## 2019-03-01 RX ORDER — SODIUM CHLORIDE 9 MG/ML
125 INJECTION, SOLUTION INTRAVENOUS CONTINUOUS
Status: DISCONTINUED | OUTPATIENT
Start: 2019-03-01 | End: 2019-03-01 | Stop reason: HOSPADM

## 2019-03-01 RX ORDER — MIDAZOLAM HYDROCHLORIDE 1 MG/ML
INJECTION INTRAMUSCULAR; INTRAVENOUS AS NEEDED
Status: DISCONTINUED | OUTPATIENT
Start: 2019-03-01 | End: 2019-03-01 | Stop reason: SURG

## 2019-03-01 RX ORDER — PROPOFOL 10 MG/ML
INJECTION, EMULSION INTRAVENOUS CONTINUOUS PRN
Status: DISCONTINUED | OUTPATIENT
Start: 2019-03-01 | End: 2019-03-01 | Stop reason: SURG

## 2019-03-01 RX ORDER — DIPHENHYDRAMINE HYDROCHLORIDE 50 MG/ML
INJECTION INTRAMUSCULAR; INTRAVENOUS AS NEEDED
Status: DISCONTINUED | OUTPATIENT
Start: 2019-03-01 | End: 2019-03-01 | Stop reason: SURG

## 2019-03-01 RX ORDER — ONDANSETRON 2 MG/ML
4 INJECTION INTRAMUSCULAR; INTRAVENOUS EVERY 6 HOURS PRN
Status: DISCONTINUED | OUTPATIENT
Start: 2019-03-01 | End: 2019-03-01 | Stop reason: HOSPADM

## 2019-03-01 RX ORDER — BUPIVACAINE HYDROCHLORIDE AND EPINEPHRINE 5; 5 MG/ML; UG/ML
INJECTION, SOLUTION EPIDURAL; INTRACAUDAL; PERINEURAL AS NEEDED
Status: DISCONTINUED | OUTPATIENT
Start: 2019-03-01 | End: 2019-03-01 | Stop reason: HOSPADM

## 2019-03-01 RX ORDER — SODIUM CHLORIDE 9 MG/ML
INJECTION, SOLUTION INTRAVENOUS CONTINUOUS PRN
Status: DISCONTINUED | OUTPATIENT
Start: 2019-03-01 | End: 2019-03-01 | Stop reason: SURG

## 2019-03-01 RX ORDER — PROPOFOL 10 MG/ML
INJECTION, EMULSION INTRAVENOUS AS NEEDED
Status: DISCONTINUED | OUTPATIENT
Start: 2019-03-01 | End: 2019-03-01 | Stop reason: SURG

## 2019-03-01 RX ADMIN — ONDANSETRON 4 MG: 2 INJECTION INTRAMUSCULAR; INTRAVENOUS at 09:48

## 2019-03-01 RX ADMIN — DEXAMETHASONE SODIUM PHOSPHATE 10 MG: 10 INJECTION INTRAMUSCULAR; INTRAVENOUS at 08:16

## 2019-03-01 RX ADMIN — ONDANSETRON 4 MG: 2 INJECTION INTRAMUSCULAR; INTRAVENOUS at 07:55

## 2019-03-01 RX ADMIN — SCOPALAMINE 1 PATCH: 1 PATCH, EXTENDED RELEASE TRANSDERMAL at 07:55

## 2019-03-01 RX ADMIN — VANCOMYCIN HYDROCHLORIDE 1000 MG: 1 INJECTION, SOLUTION INTRAVENOUS at 07:23

## 2019-03-01 RX ADMIN — MIDAZOLAM HYDROCHLORIDE 2 MG: 1 INJECTION, SOLUTION INTRAMUSCULAR; INTRAVENOUS at 07:55

## 2019-03-01 RX ADMIN — PROPOFOL 150 MG: 10 INJECTION, EMULSION INTRAVENOUS at 08:01

## 2019-03-01 RX ADMIN — FENTANYL CITRATE 25 MCG: 50 INJECTION, SOLUTION INTRAMUSCULAR; INTRAVENOUS at 09:54

## 2019-03-01 RX ADMIN — MORPHINE SULFATE 15 MG: 15 TABLET ORAL at 11:14

## 2019-03-01 RX ADMIN — FENTANYL CITRATE 25 MCG: 50 INJECTION, SOLUTION INTRAMUSCULAR; INTRAVENOUS at 10:06

## 2019-03-01 RX ADMIN — KETAMINE HYDROCHLORIDE 20 MG: 50 INJECTION INTRAMUSCULAR; INTRAVENOUS at 08:18

## 2019-03-01 RX ADMIN — Medication 0.1 MCG/KG/MIN: at 08:18

## 2019-03-01 RX ADMIN — PROPOFOL 150 MCG/KG/MIN: 10 INJECTION, EMULSION INTRAVENOUS at 08:18

## 2019-03-01 RX ADMIN — NEOSTIGMINE METHYLSULFATE 3 MG: 1 INJECTION INTRAVENOUS at 09:06

## 2019-03-01 RX ADMIN — DIPHENHYDRAMINE HYDROCHLORIDE 25 MG: 50 INJECTION, SOLUTION INTRAMUSCULAR; INTRAVENOUS at 07:59

## 2019-03-01 RX ADMIN — SODIUM CHLORIDE: 0.9 INJECTION, SOLUTION INTRAVENOUS at 07:23

## 2019-03-01 RX ADMIN — CHLORHEXIDINE GLUCONATE 15 ML: 1.2 RINSE ORAL at 07:23

## 2019-03-01 RX ADMIN — KETAMINE HYDROCHLORIDE 0.1 MG/KG/HR: 50 INJECTION INTRAMUSCULAR; INTRAVENOUS at 08:18

## 2019-03-01 RX ADMIN — FENTANYL CITRATE 25 MCG: 50 INJECTION, SOLUTION INTRAMUSCULAR; INTRAVENOUS at 10:01

## 2019-03-01 RX ADMIN — GLYCOPYRROLATE 0.4 MG: 0.2 INJECTION, SOLUTION INTRAMUSCULAR; INTRAVENOUS at 09:06

## 2019-03-01 RX ADMIN — ROCURONIUM BROMIDE 40 MG: 10 INJECTION INTRAVENOUS at 08:01

## 2019-03-01 RX ADMIN — SODIUM CHLORIDE: 0.9 INJECTION, SOLUTION INTRAVENOUS at 08:10

## 2019-03-01 RX ADMIN — FENTANYL CITRATE 25 MCG: 50 INJECTION, SOLUTION INTRAMUSCULAR; INTRAVENOUS at 10:12

## 2019-03-01 NOTE — ANESTHESIA PREPROCEDURE EVALUATION
Review of Systems/Medical History  Patient summary reviewed  Chart reviewed  History of anesthetic complications PONV    Cardiovascular  EKG reviewed, No hypertension , No CAD ,   Comment: Normal sinus rhythm  Normal ECG  When compared with ECG of 26-APR-2011 15:27,  No significant change since prior tracing    Confirmed by Janna Arguelles MD, Alicia Castillo ([id number]) on 1/19/2017 3:25:46 PM    Specimen Collected: 01/19/17 13:33  Last Resulted: 01/19/17 15:25      ,  Pulmonary  Not a smoker , Asthma , well controlled/ stable , No recent URI , No sleep apnea ,        GI/Hepatic            Endo/Other     GYN       Hematology   Musculoskeletal       Neurology   Psychology   Anxiety, Depression ,              Physical Exam    Airway    Mallampati score: II  TM Distance: >3 FB       Dental   No notable dental hx     Cardiovascular      Pulmonary      Other Findings        Anesthesia Plan  ASA Score- 2     Anesthesia Type- general with ASA Monitors  Additional Monitors:   Airway Plan:     Comment:  JUSTA Castillo , have personally seen and evaluated the patient prior to anesthetic care  I have reviewed the pre-anesthetic record, and other medical records if appropriate to the anesthetic care  If a CRNA is involved in the case, I have reviewed the CRNA assessment, if present, and agree  Risks/benefits and alternatives discussed with patient including possible PONV, sore throat, and possibility of rare anesthetic and surgical emergencies        Plan Factors-    Induction- intravenous  Postoperative Plan- Plan for postoperative opioid use  Planned trial extubation    Informed Consent- Anesthetic plan and risks discussed with patient  I personally reviewed this patient with the CRNA  Discussed and agreed on the Anesthesia Plan with the CRNA  Antonella Ingram

## 2019-03-01 NOTE — PROGRESS NOTES
Anesthesiology Attending:    Ms Cristobal Kurtz had an IV inadvertently placed in her right arm during the procedure  I spoke with Ms Cristobal Kurtz and explained that the IV was placed in order to obtain secondary access because a TIVA was being utilized for PONV prophylaxis  Currently, she is noting some fullness in right her right hand but denies pain  We have applied an ice pack at her direction and have offered additional analgesics  I instructed her to contact the hospital and ask to speak to the anesthesiologist on-call if symptoms worsen or persist   At this point in time, she does not have additional questions or concerns      Leo Mcclellan MD  March 1, 2019  11:36 AM

## 2019-03-01 NOTE — OP NOTE
OPERATIVE REPORT  PATIENT NAME: Pascual Andrew    :  1975  MRN: [id number]  Pt Location: AN OR ROOM 01    SURGERY DATE: 3/1/2019    Surgeon(s) and Role:     * Adrien Shoemaker MD - Primary    Preop Diagnosis:  Chronic pain syndrome [G89 4]    Post-Op Diagnosis Codes:     * Chronic pain syndrome [G89 4]    Procedure(s) (LRB):  REMOVAL OF IMPLANTABLE PULSE GENERATOR (IPG) DORSAL SPINAL COLUMN STIMULATOR  (DCS) AND GENERATOR OF CERVICAL AND THORACIC (NEUROMONITORING) (Bilateral)    Specimen(s):  * No specimens in log *    Estimated Blood Loss:   Minimal    Drains:  * No LDAs found *    Anesthesia Type:   General    Operative Indications:  Chronic pain syndrome [G89 4]      Operative Findings:  See dictated note    Complications:   None    Procedure and Technique:  The patient was taken to operative theater induced under general anesthesia and intubated she was positioned prone a Tiago frame  All prior incisions were marked she was prepped and draped in sterile fashion  I made an incision with a 10 blade and performed electrocautery for all incisions  I initially opened both battery pockets and removed both generators I cut the wires of the electrode on both sides I then removed the generators from the field  Starting with the cervical electrode I opened the incision and was able to identify the anchor I was able to removed the electrode entirely by pulling the electrode out after freeing up the anchor  Next I then opened the thoracic incision and dissected all the way down to the paddle by following the electrode wires using cautery  Once I was down to the lamina I used currettes to free up the scar and after exposing the paddle I was able to pull the entire paddle free  I removed all anchors and the wires were removed  I then confirmed that the cut ends were matching the cut ends from the generator  I took fluoro images and indeed it confirmed no obvious residual hardware remaining   Therefore I was fairly confident that the entire cervical and thoracic systems had been removed  After all hardware was removed we irrigated each wound  And then closed in layers using 2 0 Vicryl for the subcutaneous tissues and monocryl and hystacryl for the skin sterile dressing was placed  Patient was repositioned supine the hospital bed extubated to room air  She was taken to the postop recovery area stable condition  All needle and sponge counts were correct at the procedure  All neuromonitoring remained stable during the procedure         I was present for the entire procedure    Patient Disposition:  PACU     SIGNATURE: Mackenzie Molina MD  DATE: March 1, 2019  TIME: 9:28 AM

## 2019-03-01 NOTE — ANESTHESIA POSTPROCEDURE EVALUATION
Post-Op Assessment Note    CV Status:  Stable    Pain management: adequate     Mental Status:  Alert and awake   Hydration Status:  Euvolemic   PONV Controlled:  Controlled   Airway Patency:  Patent   Post Op Vitals Reviewed: Yes      Staff: CRNA           BP   179/84   Temp   98   Pulse  84   Resp   18   SpO2   95

## 2019-03-04 ENCOUNTER — TELEPHONE (OUTPATIENT)
Dept: NEUROSURGERY | Facility: CLINIC | Age: 44
End: 2019-03-04

## 2019-03-04 NOTE — TELEPHONE ENCOUNTER
Called Chastity Shaw to follow up on patient POD 3  Patient reports that she is doing very well overall and denies any incisional issues or fevers  Patient denies any bleeding, dizziness, fever, redness, significant pain and swelling  States that she is still having some soreness on the right side where they inserted the IV and that she still has a sore scratchy throat but otherwise she is doing okay  Having some muscle stiffness as well  Verified date/time/location of her upcoming POV on 3/15/19 and advised her to call the office with any further questions or concerns, or if any incisional issues or fevers would arise  Patient received and does understand the discharge instructions  Reviewed incision care with Patient and she has no further questions at this time  Patient was appreciative for the call

## 2019-03-15 ENCOUNTER — CLINICAL SUPPORT (OUTPATIENT)
Dept: NEUROSURGERY | Facility: CLINIC | Age: 44
End: 2019-03-15

## 2019-03-15 VITALS — DIASTOLIC BLOOD PRESSURE: 64 MMHG | TEMPERATURE: 99.3 F | SYSTOLIC BLOOD PRESSURE: 92 MMHG

## 2019-03-15 DIAGNOSIS — Z98.890 POSTOPERATIVE STATE: Primary | ICD-10-CM

## 2019-03-15 DIAGNOSIS — Z79.2 PROPHYLACTIC ANTIBIOTIC: ICD-10-CM

## 2019-03-15 DIAGNOSIS — Z98.890 POST-OPERATIVE STATE: ICD-10-CM

## 2019-03-15 PROCEDURE — 99024 POSTOP FOLLOW-UP VISIT: CPT

## 2019-03-15 RX ORDER — DOXYCYCLINE HYCLATE 100 MG/1
100 CAPSULE ORAL EVERY 12 HOURS SCHEDULED
Qty: 14 CAPSULE | Refills: 0 | Status: CANCELLED | OUTPATIENT
Start: 2019-03-15 | End: 2019-03-22

## 2019-03-15 RX ORDER — DOXYCYCLINE HYCLATE 100 MG/1
100 CAPSULE ORAL EVERY 12 HOURS SCHEDULED
Qty: 14 CAPSULE | Refills: 0 | Status: SHIPPED | OUTPATIENT
Start: 2019-03-15 | End: 2019-03-22

## 2019-03-15 NOTE — PROGRESS NOTES
Post-Op Visit-Neurosurgery    Darshan Duvall 40 y o  female MRN: [id number]    Chief Complaint  Patient presents post: REMOVAL OF IMPLANTABLE PULSE GENERATOR (IPG) DORSAL SPINAL COLUMN STIMULATOR  (DCS) AND GENERATOR OF CERVICAL AND THORACIC (NEUROMONITORING) (Bilateral Spine Thoracic)    History of Present Illness  Patient presents for 2 week POV for incision check  Arrived unaccompanied and ambulated well without assistive device  Reports she has very little pain except some tenderness at her midthoracic incisions  Has discontinued the use of narcotic pain medication and has not requested any refills today  She reports finding blood on her shirt this morning at the area of this incision  Denies purulent drainage or fevers  Has been using Hibiclens to wash  Assessment  Wound Exam: 3 of 4 incisions are clean, dry, and in tact, well approximated without heat, swelling, or drainage  Midline thoracic incisions has small area of superficial skin spreading at the inferior pole, with some serosanguinous drainage  Mild swelling noted around the area of this superficial spread  See images below:     Right Scapula area      Left upper buttock      Right upper buttock      Midline thoracic spine       Procedure  Staple/suture assessment  Complications: None  Incision JUAN JOSE  Discussion/Summary  Clipped the long ends of all sutures to prevent pulling on clothing, which may have caused thoracic incision to open  Advised not to use Hibiclens at this time as this is very harsh, educated that liquid dial soap and water should be used  Reviewed images of incision with SETH HERNANDEZ, who ordered prophylactic antibiotic of doxycycline as patient has various allergies, and scheduled repeat incision check for 1 week from now       Reviewed incision care with patient including daily observation for s/s infection including: increased erythema, edema, drainage, dehiscence of incision or fever >101  Should these be observed, she understands that she is to call and/or return immediately for reassessment  Advised patient to continue cleansing area with mild soap and water and pat dry  Not to apply any lotions, creams, or ointments, & not to submerge in any water until thoracic incision is closed  She is to maintain activity restrictions until cleared by the surgeon  Ambulation is encouraged as tolerated  Verified date/time/location of upcoming POV 4/4/2019 with Dr Armida Williamson  She is to call the office with any further questions or concerns, or if any incisional issues or fevers would arise       Patient has left [employer] equipment at the request of Joshua Kuhn to be picked up

## 2019-03-21 ENCOUNTER — TELEPHONE (OUTPATIENT)
Dept: NEUROSURGERY | Facility: CLINIC | Age: 44
End: 2019-03-21

## 2019-03-21 NOTE — TELEPHONE ENCOUNTER
03/21/2019-CALLED PT ON CELL# TO RESCHEDULE TODAY'S "NO SHOW" APPT, NO LONGER IN SERVICE  CALLED PT AT HOME#, PHONE NOT ACCEPTING CALLS AT [phone] [facility]

## 2019-04-04 ENCOUNTER — TELEPHONE (OUTPATIENT)
Dept: NEUROSURGERY | Facility: CLINIC | Age: 44
End: 2019-04-04

## 2019-04-26 ENCOUNTER — OFFICE VISIT (OUTPATIENT)
Dept: URGENT CARE | Age: 44
End: 2019-04-26
Payer: COMMERCIAL

## 2019-04-26 VITALS
SYSTOLIC BLOOD PRESSURE: 114 MMHG | OXYGEN SATURATION: 98 % | HEIGHT: 66 IN | RESPIRATION RATE: 18 BRPM | WEIGHT: 172 LBS | BODY MASS INDEX: 27.64 KG/M2 | TEMPERATURE: 97 F | HEART RATE: 96 BPM | DIASTOLIC BLOOD PRESSURE: 75 MMHG

## 2019-04-26 DIAGNOSIS — J06.9 VIRAL URI WITH COUGH: Primary | ICD-10-CM

## 2019-04-26 PROCEDURE — 99213 OFFICE O/P EST LOW 20 MIN: CPT | Performed by: NURSE PRACTITIONER

## 2019-04-26 RX ORDER — GABAPENTIN 600 MG/1
2 TABLET ORAL 3 TIMES DAILY
COMMUNITY
Start: 2015-12-01

## 2019-04-26 RX ORDER — ALBUTEROL SULFATE 90 UG/1
2 AEROSOL, METERED RESPIRATORY (INHALATION) AS NEEDED
Qty: 1 EACH | Refills: 0 | Status: SHIPPED | OUTPATIENT
Start: 2019-04-26

## 2019-06-12 ENCOUNTER — OFFICE VISIT (OUTPATIENT)
Dept: FAMILY MEDICINE CLINIC | Facility: CLINIC | Age: 44
End: 2019-06-12
Payer: COMMERCIAL

## 2019-06-12 VITALS
OXYGEN SATURATION: 98 % | HEART RATE: 79 BPM | HEIGHT: 66 IN | SYSTOLIC BLOOD PRESSURE: 100 MMHG | BODY MASS INDEX: 27.97 KG/M2 | WEIGHT: 174 LBS | TEMPERATURE: 98 F | DIASTOLIC BLOOD PRESSURE: 70 MMHG

## 2019-06-12 DIAGNOSIS — M54.50 ACUTE MIDLINE LOW BACK PAIN WITHOUT SCIATICA: ICD-10-CM

## 2019-06-12 DIAGNOSIS — T78.2XXA ANAPHYLAXIS, INITIAL ENCOUNTER: ICD-10-CM

## 2019-06-12 DIAGNOSIS — N92.6 IRREGULAR PERIODS: Primary | ICD-10-CM

## 2019-06-12 PROCEDURE — 99214 OFFICE O/P EST MOD 30 MIN: CPT | Performed by: FAMILY MEDICINE

## 2019-06-12 PROCEDURE — 3008F BODY MASS INDEX DOCD: CPT | Performed by: FAMILY MEDICINE

## 2019-06-12 RX ORDER — EPINEPHRINE 0.3 MG/.3ML
0.3 INJECTION SUBCUTANEOUS ONCE
Qty: 0.3 ML | Refills: 2 | Status: SHIPPED | OUTPATIENT
Start: 2019-06-12 | End: 2019-06-12

## 2019-06-27 ENCOUNTER — HOSPITAL ENCOUNTER (EMERGENCY)
Facility: HOSPITAL | Age: 44
Discharge: HOME | End: 2019-06-27
Attending: EMERGENCY MEDICINE | Admitting: EMERGENCY MEDICINE
Payer: COMMERCIAL

## 2019-06-27 ENCOUNTER — APPOINTMENT (EMERGENCY)
Dept: RADIOLOGY | Facility: HOSPITAL | Age: 44
End: 2019-06-27
Payer: COMMERCIAL

## 2019-06-27 VITALS
TEMPERATURE: 98.1 F | WEIGHT: 175.93 LBS | DIASTOLIC BLOOD PRESSURE: 87 MMHG | SYSTOLIC BLOOD PRESSURE: 119 MMHG | HEART RATE: 87 BPM | BODY MASS INDEX: 28.83 KG/M2 | RESPIRATION RATE: 19 BRPM | OXYGEN SATURATION: 100 %

## 2019-06-27 DIAGNOSIS — S93.409A ANKLE SPRAIN: Primary | ICD-10-CM

## 2019-06-27 PROCEDURE — 99283 EMERGENCY DEPT VISIT LOW MDM: CPT

## 2019-06-27 PROCEDURE — 73610 X-RAY EXAM OF ANKLE: CPT

## 2019-06-27 PROCEDURE — 99283 EMERGENCY DEPT VISIT LOW MDM: CPT | Performed by: EMERGENCY MEDICINE

## 2019-06-27 RX ORDER — NAPROXEN 250 MG/1
500 TABLET ORAL ONCE
Status: COMPLETED | OUTPATIENT
Start: 2019-06-27 | End: 2019-06-27

## 2019-06-27 RX ADMIN — NAPROXEN 500 MG: 250 TABLET ORAL at 20:59

## 2019-06-28 ENCOUNTER — TELEPHONE (OUTPATIENT)
Dept: FAMILY MEDICINE CLINIC | Facility: CLINIC | Age: 44
End: 2019-06-28

## 2019-06-28 NOTE — ED PROVIDER NOTES
Pt Name: Ayush Millan  MRN: [id number]  [place] 1975  Age/Sex: 40 y o  female  Date of evaluation: 6/27/2019  PCP: Raúl Horan MD    [address] [place] [address]    Chief Complaint   Patient presents with    Ankle Injury     pt reports twisting her left ankle while walking to giant this afternoon then while getting up from toliet again, no deformity noted, palpable pulses         HPI    Ramjean pierre Pretty presents to the Emergency Department complaining of left ankle pain  She twisted while walking and she has had a fracture in that ankle in the past   No bruising, swelling, abrasion or other external evidence of trauma          HPI      Past Medical and Surgical History    Past Medical History:   Diagnosis Date    Anxiety     Asthma     Back pain     Chronic narcotic dependence ([facility] [address] )     CRPS (complex regional pain syndrome type I)     CRPS (complex regional pain syndrome type I)     RLE and RUE    Current mild episode of major depressive disorder without prior episode ([facility] [address] ) 10/15/2018    Depression     Fracture     2015 right knee fracture    Latex allergy     Limb alert care status     RUE, RLE    Mobility impaired     uses cane    Optic neuritis, left     PONV (postoperative nausea and vomiting)     RSD (reflex sympathetic dystrophy)     CRPS RUE/RLE    Wears glasses        Past Surgical History:   Procedure Laterality Date    BREAST LUMPECTOMY      Left    IMPLANTATION / PLACEMENT EPIDURAL NEUROSTIMULATOR ELECTRODES Right     KNEE ARTHROSCOPY Right     WI IMPLANT SPINAL NEUROSTIM/ Right 7/18/2017    Procedure: PLACEMENT OF NEW SPINAL COLUMN STIMULATOR IN THE RIGHT BUTTOCK THROUGH SEPERATE INCISION WITH TUNNELING OF LEAD; POSSIBLE PLACEMENT OF EXTENSION;  Surgeon: Raudel Argueta MD;  [facility];  Service: Neurosurgery    WI IMPLANT SPINAL NEUROSTIM/ N/A 4/25/2018    Procedure: Removal of left flank spinal cord stimulator generator, placement of new spinal cord stimulator generator in left buttock through separate incision;  Surgeon: Cristobal Zuñiga MD;  [facility];  Service: Neurosurgery    RI REVISE/REMOVE SPINAL NEUROSTIM/ Right 2017    Procedure: REMOVAL RIGHT FLANK SPINAL COLUMN STIMULATOR IPG;  Surgeon: Cristobal Zuñiga MD;  [facility];  Service: Neurosurgery    RI REVISE/REMOVE SPINAL NEUROSTIM/ Bilateral 3/1/2019    Procedure: REMOVAL OF IMPLANTABLE PULSE GENERATOR (IPG) DORSAL SPINAL COLUMN STIMULATOR  (DCS) AND GENERATOR OF CERVICAL AND THORACIC (NEUROMONITORING); Surgeon: Jean Romero MD;  [facility];  Service: Neurosurgery    RI SURG IMPLNT [address] N/A 2016    Procedure: LAMINECTOMY  T10 -6 WITH  PLACEMENT OF  SPINAL CORD STIMULATOR AND PULSE GEBNERATOR  IMPULSE MONITORING ;  Surgeon: Kemi Allen MD;  [facility];  Service: Orthopedics    TUBAL LIGATION Bilateral     WISDOM TOOTH EXTRACTION         Family History   Adopted: Yes       Social History     Tobacco Use    Smoking status: Former Smoker     Packs/day: 0 50     Years: 5 00     Pack years: 2 50     Types: Cigarettes     Last attempt to quit: 2010     Years since quittin 0    Smokeless tobacco: Former User   Substance Use Topics    Alcohol use: Yes     Frequency: Monthly or less     Drinks per session: 1 or 2     Binge frequency: Never     Comment: Holidays    Drug use: No              Allergies    Allergies   Allergen Reactions    Bee Venom Anaphylaxis    Codeine Anaphylaxis     Reaction Date: 2011;   Possible other narcotics; pt may take Narco    Hydrocodone-Guaifenesin Anaphylaxis    Iodinated Diagnostic Agents Anaphylaxis    Iodine Anaphylaxis    Latex Anaphylaxis    Other Anaphylaxis     Narcotics  the patient states, "I can only take Dilaudid and Toradol"    Oxycodone Hcl Anaphylaxis    Penicillins Anaphylaxis     Anaphylaxis    Percocet [Oxycodone-Acetaminophen] Anaphylaxis    Robaxin [Methocarbamol] Other (See Comments)     Stroke symptoms    Shellfish-Derived Products Anaphylaxis    Clindamycin Rash       Home Medications    Prior to Admission medications    Medication Sig Start Date End Date Taking? Authorizing Provider   albuterol (PROVENTIL HFA,VENTOLIN HFA) 90 mcg/act inhaler Inhale 2 puffs as needed for wheezing 4/26/19   BESSIE Camarillo   baclofen 10 mg tablet Take 10 mg by mouth 4 (four) times a day      Historical Provider, MD   DULoxetine (CYMBALTA) 30 mg delayed release capsule Take 30 mg by mouth 2 (two) times a day     Historical Provider, MD   EPINEPHrine (EPIPEN 2-HERNAN) 0 3 mg/0 3 mL SOAJ Inject 0 3 mL (0 3 mg total) into a muscle once for 1 dose 6/12/19 6/12/19  Jc Rosas MD   ergocalciferol (VITAMIN D2) 50,000 units Take 1 capsule (50,000 Units total) by mouth once a week 2/6/19   Jc Rosas MD   gabapentin (NEURONTIN) 600 MG tablet 2 tablets 3 (three) times a day 12/1/15   Historical Provider, MD           Review of Systems    Review of Systems   Constitutional: Negative for activity change, appetite change, chills, diaphoresis, fatigue and fever  HENT: Negative for congestion, postnasal drip, rhinorrhea, sinus pressure, sneezing and sore throat  Eyes: Negative for pain and visual disturbance  Respiratory: Negative for cough, chest tightness and shortness of breath  Cardiovascular: Negative for chest pain, palpitations and leg swelling  Gastrointestinal: Negative for abdominal distention, abdominal pain, constipation, diarrhea, nausea and vomiting  Endocrine: Negative for polydipsia, polyphagia and polyuria  Genitourinary: Negative for decreased urine volume, difficulty urinating, dysuria, flank pain, frequency and hematuria  Musculoskeletal: Negative for arthralgias, gait problem, joint swelling and neck pain  Skin: Negative for pallor and rash  Allergic/Immunologic: Negative for immunocompromised state     Neurological: Negative for syncope, speech difficulty, weakness, light-headedness, numbness and headaches  All other systems reviewed and are negative  Physical Exam      ED Triage Vitals [06/27/19 2052]   Temperature Pulse Respirations Blood Pressure SpO2   98 1 °F (36 7 °C) 87 19 119/87 100 %      Temp src Heart Rate Source Patient Position - Orthostatic VS BP Location FiO2 (%)   -- Monitor -- -- --      Pain Score       Worst Possible Pain               Physical Exam   Constitutional: She is oriented to person, place, and time  She appears well-developed and well-nourished  No distress  HENT:   Head: Normocephalic and atraumatic  Nose: Nose normal    Mouth/Throat: Oropharynx is clear and moist    Eyes: Pupils are equal, round, and reactive to light  Conjunctivae, EOM and lids are normal    Neck: Normal range of motion  Neck supple  Cardiovascular: Normal rate, regular rhythm and normal heart sounds  Exam reveals no gallop and no friction rub  No murmur heard  Pulmonary/Chest: Effort normal and breath sounds normal  No accessory muscle usage  No respiratory distress  She has no wheezes  She has no rales  Abdominal: Soft  She exhibits no distension  There is no tenderness  There is no rebound and no guarding  Musculoskeletal:        Left ankle: She exhibits decreased range of motion  She exhibits no swelling, no ecchymosis, no deformity, no laceration and normal pulse  Tenderness  Lateral malleolus, medial malleolus and AITFL tenderness found  Neurological: She is alert and oriented to person, place, and time  No cranial nerve deficit or sensory deficit  Skin: Skin is warm and dry  No rash noted  She is not diaphoretic  No erythema  Psychiatric: She has a normal mood and affect  Her speech is normal and behavior is normal  Judgment and thought content normal    Nursing note and vitals reviewed               Assessment and Plan    Sena Singh is a 40 y o  female who presents with left ankle pain after she twisted it  Physical examination remarkable for tenderness with palpation and ROM  Differential diagnosis (not completely inclusive) includes fracture vs sprain  Plan will be to perform diagnostic testing and treat symptomatically  MDM    Diagnostic Results        Radiology:    XR ankle 3+ views LEFT    (Results Pending)     No acute fracture      All radiology studies independently viewed by me and interpreted by the radiologist     Procedure    Procedures    [facility]      ED Course of Care and Re-Assessments        Medications   naproxen (NAPROSYN) tablet 500 mg (500 mg Oral Given 6/27/19 2059)           FINAL IMPRESSION    Final diagnoses: Ankle sprain         DISPOSITION/PLAN    Time reflects when diagnosis was documented in both MDM as applicable and the Disposition within this note     Time User Action Codes Description Comment    6/27/2019  8:56 PM Justyn Misael Add [W50 197P] Ankle sprain       ED Disposition     ED Disposition Condition Date/Time Comment    Discharge Stable u Jun 27, 2019  8:56 PM [place] discharge to home/self care  Follow-up Information     Follow up With Specialties Details Why Contact Info Additional Information    Zane Fowler MD Family Medicine Schedule an appointment as soon as possible for a visit   [address]  Orthopedic Surgery   [address] [facility] [address] [phone] [address], [address] [facility],  [address] [place] [address], [id number]            PATIENT REFERRED TO:    Zane Fowler MD  [address]   [phone]    Schedule an appointment as soon as possible for a visit       [address]  [address] [facility]  [place] [address] [facility] [id number] [address] MEDICATIONS:    Discharge Medication List as of 6/27/2019  8:57 PM      CONTINUE these medications which have NOT CHANGED    Details   albuterol (PROVENTIL HFA,VENTOLIN HFA) 90 mcg/act inhaler Inhale 2 puffs as needed for wheezing, Starting Fri 4/26/2019, Normal      baclofen 10 mg tablet Take 10 mg by mouth 4 (four) times a day  , Historical Med      DULoxetine (CYMBALTA) 30 mg delayed release capsule Take 30 mg by mouth 2 (two) times a day , Historical Med      EPINEPHrine (EPIPEN 2-HERNAN) 0 3 mg/0 3 mL SOAJ Inject 0 3 mL (0 3 mg total) into a muscle once for 1 dose, Starting Wed 6/12/2019, Normal      ergocalciferol (VITAMIN D2) 50,000 units Take 1 capsule (50,000 Units total) by mouth once a week, Starting Wed 2/6/2019, Normal      gabapentin (NEURONTIN) 600 MG tablet 2 tablets 3 (three) times a day, Starting Tue 12/1/2015, Historical Med             No discharge procedures on file           Taniya Rehman, [address] [place] [address], DO  06/27/19 0919

## 2020-02-18 ENCOUNTER — HOSPITAL ENCOUNTER (EMERGENCY)
Facility: HOSPITAL | Age: 45
Discharge: HOME | End: 2020-02-18
Attending: EMERGENCY MEDICINE
Payer: COMMERCIAL

## 2020-02-18 VITALS
SYSTOLIC BLOOD PRESSURE: 106 MMHG | WEIGHT: 151.9 LBS | BODY MASS INDEX: 24.89 KG/M2 | HEART RATE: 85 BPM | TEMPERATURE: 97.3 F | RESPIRATION RATE: 18 BRPM | DIASTOLIC BLOOD PRESSURE: 63 MMHG | OXYGEN SATURATION: 98 %

## 2020-02-18 DIAGNOSIS — B34.9 VIRAL SYNDROME: Primary | ICD-10-CM

## 2020-02-18 DIAGNOSIS — J06.9 URI (UPPER RESPIRATORY INFECTION): ICD-10-CM

## 2020-02-18 PROCEDURE — 99284 EMERGENCY DEPT VISIT MOD MDM: CPT

## 2020-02-18 PROCEDURE — 99284 EMERGENCY DEPT VISIT MOD MDM: CPT | Performed by: EMERGENCY MEDICINE

## 2020-02-18 RX ORDER — GUAIFENESIN 600 MG
1200 TABLET, EXTENDED RELEASE 12 HR ORAL EVERY 12 HOURS SCHEDULED
Qty: 30 TABLET | Refills: 0 | Status: SHIPPED | OUTPATIENT
Start: 2020-02-18

## 2020-02-18 RX ORDER — FLUTICASONE PROPIONATE 50 MCG
1 SPRAY, SUSPENSION (ML) NASAL DAILY
Qty: 16 G | Refills: 0 | Status: SHIPPED | OUTPATIENT
Start: 2020-02-18

## 2020-02-18 RX ORDER — BENZONATATE 100 MG/1
100 CAPSULE ORAL EVERY 8 HOURS
Qty: 21 CAPSULE | Refills: 0 | Status: SHIPPED | OUTPATIENT
Start: 2020-02-18

## 2020-02-18 NOTE — ED PROVIDER NOTES
History  Chief Complaint   Patient presents with    Generalized Body Aches     2 days, lives in shelter with others-sick    Sore Throat     7/10    Chest Pain     when trying to breath     42-year-old female presents with influenza type symptoms  She reports that she has been at the shelter for the past week and for the past 2-3 days has had flu-like symptoms  She did receive a flu shot this year  She complains of runny nose, congestion, sore throat, subjective fevers and chills, and sinus pain  She denies GI symptoms or other acute issues/concerns  URI   Presenting symptoms: congestion, cough, rhinorrhea and sore throat    Presenting symptoms: no fatigue and no fever    Severity:  Moderate  Onset quality:  Gradual  Timing:  Constant  Progression:  Waxing and waning  Chronicity:  New  Relieved by:  Nothing  Worsened by:  Nothing  Ineffective treatments:  None tried  Associated symptoms: no arthralgias, no headaches, no myalgias, no neck pain, no sinus pain and no wheezing        Prior to Admission Medications   Prescriptions Last Dose Informant Patient Reported? Taking?    DULoxetine (CYMBALTA) 30 mg delayed release capsule  Self Yes No   Sig: Take 30 mg by mouth 2 (two) times a day    EPINEPHrine (EPIPEN 2-HERNAN) 0 3 mg/0 3 mL SOAJ   No No   Sig: Inject 0 3 mL (0 3 mg total) into a muscle once for 1 dose   albuterol (PROVENTIL HFA,VENTOLIN HFA) 90 mcg/act inhaler  Self No No   Sig: Inhale 2 puffs as needed for wheezing   baclofen 10 mg tablet  Self Yes No   Sig: Take 10 mg by mouth 4 (four) times a day     ergocalciferol (VITAMIN D2) 50,000 units  Self No No   Sig: Take 1 capsule (50,000 Units total) by mouth once a week   gabapentin (NEURONTIN) 600 MG tablet  Self Yes No   Si tablets 3 (three) times a day      Facility-Administered Medications: None       Past Medical History:   Diagnosis Date    Anxiety     Asthma     Back pain     Chronic narcotic dependence (HCC)     CRPS (complex regional pain syndrome type I)     CRPS (complex regional pain syndrome type I)     RLE and RUE    Current mild episode of major depressive disorder without prior episode ([facility] [address] ) 10/15/2018    Depression     Fracture     2015 right knee fracture    Latex allergy     Limb alert care status     RUE, RLE    Mobility impaired     uses cane    Optic neuritis, left     PONV (postoperative nausea and vomiting)     RSD (reflex sympathetic dystrophy)     CRPS RUE/RLE    Wears glasses        Past Surgical History:   Procedure Laterality Date    BREAST LUMPECTOMY      Left    IMPLANTATION / PLACEMENT EPIDURAL NEUROSTIMULATOR ELECTRODES Right     KNEE ARTHROSCOPY Right     PA IMPLANT SPINAL NEUROSTIM/ Right 7/18/2017    Procedure: PLACEMENT OF NEW SPINAL COLUMN STIMULATOR IN THE RIGHT BUTTOCK THROUGH SEPERATE INCISION WITH TUNNELING OF LEAD; POSSIBLE PLACEMENT OF EXTENSION;  Surgeon: Nick Persaud MD;  [facility];  Service: Neurosurgery    PA IMPLANT SPINAL NEUROSTIM/ N/A 4/25/2018    Procedure: Removal of left flank spinal cord stimulator generator, placement of new spinal cord stimulator generator in left buttock through separate incision;  Surgeon: Nick Persaud MD;  [facility];  Service: Neurosurgery    PA REVISE/REMOVE SPINAL NEUROSTIM/ Right 7/18/2017    Procedure: REMOVAL RIGHT FLANK SPINAL COLUMN STIMULATOR IPG;  Surgeon: Nick Persaud MD;  [facility];  Service: Neurosurgery    PA REVISE/REMOVE SPINAL NEUROSTIM/ Bilateral 3/1/2019    Procedure: REMOVAL OF IMPLANTABLE PULSE GENERATOR (IPG) DORSAL SPINAL COLUMN STIMULATOR  (DCS) AND GENERATOR OF CERVICAL AND THORACIC (NEUROMONITORING);   Surgeon: Radha Veras MD;  [facility];  Service: Neurosurgery    PA SURG IMPLNT [address] N/A 7/14/2016    Procedure: LAMINECTOMY  T10 -11 WITH  PLACEMENT OF  SPINAL CORD STIMULATOR AND PULSE GEBNERATOR  IMPULSE MONITORING ;  Surgeon: Pam Still Arelis Wilson MD;  [facility];  Service: Orthopedics    TUBAL LIGATION Bilateral     WISDOM TOOTH EXTRACTION         Family History   Adopted: Yes     I have reviewed and agree with the history as documented  Social History     Tobacco Use    Smoking status: Former Smoker     Packs/day: 0 50     Years: 5 00     Pack years: 2 50     Types: Cigarettes     Last attempt to quit: 2010     Years since quittin 6    Smokeless tobacco: Former User   Substance Use Topics    Alcohol use: Yes     Frequency: Monthly or less     Drinks per session: 1 or 2     Binge frequency: Never     Comment: Holidays    Drug use: No       Review of Systems   Constitutional: Negative for appetite change, chills, fatigue and fever  HENT: Positive for congestion, postnasal drip, rhinorrhea and sore throat  Negative for sinus pain and trouble swallowing  Eyes: Negative for redness and itching  Respiratory: Positive for cough  Negative for chest tightness, shortness of breath and wheezing  Cardiovascular: Negative for chest pain and leg swelling  Gastrointestinal: Negative for abdominal pain, constipation, diarrhea, nausea and vomiting  Endocrine: Negative  Genitourinary: Negative for difficulty urinating and dysuria  Musculoskeletal: Negative for arthralgias, back pain, myalgias and neck pain  Skin: Negative for rash  Allergic/Immunologic: Negative  Neurological: Negative for dizziness, numbness and headaches  Hematological: Negative  Psychiatric/Behavioral: Negative  Physical Exam  Physical Exam   Constitutional: She is oriented to person, place, and time  She appears well-developed and well-nourished  Non-toxic appearance  She does not appear ill  No distress  HENT:   Head: Normocephalic and atraumatic  Right Ear: Tympanic membrane normal    Left Ear: Tympanic membrane normal    Nose: Mucosal edema and rhinorrhea present     Mouth/Throat: Uvula is midline and mucous membranes are normal  Posterior oropharyngeal erythema present  No oropharyngeal exudate, posterior oropharyngeal edema or tonsillar abscesses  Eyes: Pupils are equal, round, and reactive to light  Conjunctivae and EOM are normal    Neck: Normal range of motion  Neck supple  Cardiovascular: Normal rate, regular rhythm and normal heart sounds  Pulmonary/Chest: Effort normal and breath sounds normal  No stridor  No respiratory distress  She has no wheezes  She has no rhonchi  She has no rales  Abdominal: Soft  Bowel sounds are normal  There is no tenderness  There is no guarding  Musculoskeletal: She exhibits no edema, tenderness or deformity  Neurological: She is alert and oriented to person, place, and time  Skin: Skin is warm and dry  Capillary refill takes less than 2 seconds  No rash noted  Psychiatric: She has a normal mood and affect  Her behavior is normal    Nursing note and vitals reviewed  Vital Signs  ED Triage Vitals   Temperature Pulse Respirations Blood Pressure SpO2   02/18/20 0959 02/18/20 0959 02/18/20 0959 02/18/20 0959 02/18/20 0959   (!) 97 3 °F (36 3 °C) 89 18 106/63 95 %      Temp src Heart Rate Source Patient Position - Orthostatic VS BP Location FiO2 (%)   -- 02/18/20 1046 -- -- --    Monitor         Pain Score       02/18/20 0959       7           Vitals:    02/18/20 0959 02/18/20 1046   BP: 106/63    Pulse: 89 85         Visual Acuity      ED Medications  Medications - No data to display    Diagnostic Studies  Results Reviewed     None                 No orders to display              Procedures  Procedures         ED Course                               MDM  Number of Diagnoses or Management Options  URI (upper respiratory infection):   Viral syndrome:   Diagnosis management comments: 42-year-old female presents with complaint of URI symptoms over the past several days  She complains of runny nose, congestion, sore throat, and dry cough  She has had subjective fevers    She is currently residing at the shelter has been exposed to many people with similar symptoms  On exam she appears to not feel well but otherwise is nontoxic in appearance  Discussed supportive care measures, treatment plan, need for outpatient follow-up, expected clinical course, and reasons return to the ER  Disposition  Final diagnoses:   Viral syndrome   URI (upper respiratory infection)     Time reflects when diagnosis was documented in both MDM as applicable and the Disposition within this note     Time User Action Codes Description Comment    2/18/2020 10:27 AM Alhaji Goddard Add [B34 9] Viral syndrome     2/18/2020 10:27 AM Alhaji Bottom Add [J06 9] URI (upper respiratory infection)       ED Disposition     ED Disposition Condition Date/Time Comment    Discharge Stable Tue Feb 18, 2020 10:27 AM [address] [facility] [address] discharge to home/self care              Follow-up Information    None         Discharge Medication List as of 2/18/2020 10:32 AM      START taking these medications    Details   benzonatate (TESSALON PERLES) 100 mg capsule Take 1 capsule (100 mg total) by mouth every 8 (eight) hours, Starting Tue 2/18/2020, Print      fluticasone (FLONASE) 50 mcg/act nasal spray 1 spray into each nostril daily, Starting Tue 2/18/2020, Print      guaiFENesin (MUCINEX) 600 mg 12 hr tablet Take 2 tablets (1,200 mg total) by mouth every 12 (twelve) hours, Starting Tue 2/18/2020, Print         CONTINUE these medications which have NOT CHANGED    Details   albuterol (PROVENTIL HFA,VENTOLIN HFA) 90 mcg/act inhaler Inhale 2 puffs as needed for wheezing, Starting Fri 4/26/2019, Normal      baclofen 10 mg tablet Take 10 mg by mouth 4 (four) times a day  , Historical Med      DULoxetine (CYMBALTA) 30 mg delayed release capsule Take 30 mg by mouth 2 (two) times a day , Historical Med      EPINEPHrine (EPIPEN 2-HERNAN) 0 3 mg/0 3 mL SOAJ Inject 0 3 mL (0 3 mg total) into a muscle once for 1 dose, Starting Wed 6/12/2019, Normal ergocalciferol (VITAMIN D2) 50,000 units Take 1 capsule (50,000 Units total) by mouth once a week, Starting Wed 2/6/2019, Normal      gabapentin (NEURONTIN) 600 MG tablet 2 tablets 3 (three) times a day, Starting Tue 12/1/2015, Historical Med           No discharge procedures on file      PDMP Review     None          ED Provider  Electronically Signed by           Julia Johansen DO  02/18/20 1100

## 2020-03-07 ENCOUNTER — HOSPITAL ENCOUNTER (EMERGENCY)
Facility: HOSPITAL | Age: 45
Discharge: HOME | End: 2020-03-07
Attending: EMERGENCY MEDICINE | Admitting: EMERGENCY MEDICINE
Payer: COMMERCIAL

## 2020-03-07 VITALS
SYSTOLIC BLOOD PRESSURE: 98 MMHG | WEIGHT: 150 LBS | BODY MASS INDEX: 24.58 KG/M2 | RESPIRATION RATE: 18 BRPM | DIASTOLIC BLOOD PRESSURE: 52 MMHG | OXYGEN SATURATION: 99 % | TEMPERATURE: 97.1 F | HEART RATE: 79 BPM

## 2020-03-07 DIAGNOSIS — R51.9 HEADACHE: Primary | ICD-10-CM

## 2020-03-07 PROCEDURE — 99283 EMERGENCY DEPT VISIT LOW MDM: CPT

## 2020-03-07 PROCEDURE — 96375 TX/PRO/DX INJ NEW DRUG ADDON: CPT

## 2020-03-07 PROCEDURE — 96374 THER/PROPH/DIAG INJ IV PUSH: CPT

## 2020-03-07 PROCEDURE — 99284 EMERGENCY DEPT VISIT MOD MDM: CPT | Performed by: EMERGENCY MEDICINE

## 2020-03-07 RX ORDER — DIPHENHYDRAMINE HYDROCHLORIDE 50 MG/ML
25 INJECTION INTRAMUSCULAR; INTRAVENOUS ONCE
Status: COMPLETED | OUTPATIENT
Start: 2020-03-07 | End: 2020-03-07

## 2020-03-07 RX ORDER — METOCLOPRAMIDE HYDROCHLORIDE 5 MG/ML
10 INJECTION INTRAMUSCULAR; INTRAVENOUS ONCE
Status: COMPLETED | OUTPATIENT
Start: 2020-03-07 | End: 2020-03-07

## 2020-03-07 RX ORDER — PREDNISONE 50 MG/1
50 TABLET ORAL DAILY
Qty: 4 TABLET | Refills: 0 | Status: SHIPPED | OUTPATIENT
Start: 2020-03-07 | End: 2020-03-11

## 2020-03-07 RX ORDER — KETOROLAC TROMETHAMINE 30 MG/ML
15 INJECTION, SOLUTION INTRAMUSCULAR; INTRAVENOUS ONCE
Status: COMPLETED | OUTPATIENT
Start: 2020-03-07 | End: 2020-03-07

## 2020-03-07 RX ADMIN — PREDNISONE 50 MG: 20 TABLET ORAL at 15:28

## 2020-03-07 RX ADMIN — METOCLOPRAMIDE 10 MG: 5 INJECTION, SOLUTION INTRAMUSCULAR; INTRAVENOUS at 14:19

## 2020-03-07 RX ADMIN — DIPHENHYDRAMINE HYDROCHLORIDE 25 MG: 50 INJECTION INTRAMUSCULAR; INTRAVENOUS at 14:06

## 2020-03-07 RX ADMIN — KETOROLAC TROMETHAMINE 15 MG: 30 INJECTION, SOLUTION INTRAMUSCULAR at 14:11

## 2020-03-07 NOTE — ED PROVIDER NOTES
History  Chief Complaint   Patient presents with    Eye Pain     Patient reports she was seen by her eye doctor 2 days ago for left eye pain, itchiness, headache, blurred vision  States her doctor suggested she be seen and evaluated in the ER  71-year-old female presents for evaluation of left-sided headache that is been ongoing for the last 3-4 days  Patient was evaluated by her ophthalmologist and advised to go to the emergency department 2 days ago for possible steroids and optic neuritis  Patient states she [facility] back [facility] the homeless shelter and slept for the last 24 hours  She woke up without improvement of symptoms and decided to come to the emergency department  Patient denies worsening of his vision and has been the same for the last 48 hours  Patient describes the pain as retrobulbar, associated tearing and photophobia similar to prior headaches  Prior to Admission Medications   Prescriptions Last Dose Informant Patient Reported? Taking?    DULoxetine (CYMBALTA) 30 mg delayed release capsule  Self Yes No   Sig: Take 30 mg by mouth 2 (two) times a day    EPINEPHrine (EPIPEN 2-HERNAN) 0 3 mg/0 3 mL SOAJ   No No   Sig: Inject 0 3 mL (0 3 mg total) into a muscle once for 1 dose   albuterol (PROVENTIL HFA,VENTOLIN HFA) 90 mcg/act inhaler  Self No No   Sig: Inhale 2 puffs as needed for wheezing   baclofen 10 mg tablet  Self Yes No   Sig: Take 10 mg by mouth 4 (four) times a day     benzonatate (TESSALON PERLES) 100 mg capsule   No No   Sig: Take 1 capsule (100 mg total) by mouth every 8 (eight) hours   ergocalciferol (VITAMIN D2) 50,000 units  Self No No   Sig: Take 1 capsule (50,000 Units total) by mouth once a week   fluticasone (FLONASE) 50 mcg/act nasal spray   No No   Si spray into each nostril daily   gabapentin (NEURONTIN) 600 MG tablet  Self Yes No   Si tablets 3 (three) times a day   guaiFENesin (MUCINEX) 600 mg 12 hr tablet   No No   Sig: Take 2 tablets (1,200 mg total) by mouth every 12 (twelve) hours      Facility-Administered Medications: None       Past Medical History:   Diagnosis Date    Anxiety     Asthma     Back pain     [facility] narcotic dependence ([facility] [address] )     CRPS (complex regional pain syndrome type I)     CRPS (complex regional pain syndrome type I)     RLE and RUE    Current mild episode of major depressive disorder without prior episode ([facility] [address] ) 10/15/2018    Depression     Fracture     2015 right knee fracture    Latex allergy     Limb alert care status     RUE, RLE    Mobility impaired     uses cane    Optic neuritis, left     PONV (postoperative nausea and vomiting)     RSD (reflex sympathetic dystrophy)     CRPS RUE/RLE    Wears glasses        Past Surgical History:   Procedure Laterality Date    BREAST LUMPECTOMY      Left    IMPLANTATION / PLACEMENT EPIDURAL NEUROSTIMULATOR ELECTRODES Right     KNEE ARTHROSCOPY Right     MI IMPLANT SPINAL NEUROSTIM/ Right 7/18/2017    Procedure: PLACEMENT OF NEW SPINAL COLUMN STIMULATOR IN THE RIGHT BUTTOCK THROUGH SEPERATE INCISION WITH TUNNELING OF LEAD; POSSIBLE PLACEMENT OF EXTENSION;  Surgeon: Princess Escobar MD;  [facility];  Service: Neurosurgery    MI IMPLANT SPINAL NEUROSTIM/ N/A 4/25/2018    Procedure: Removal of left flank spinal cord stimulator generator, placement of new spinal cord stimulator generator in left buttock through separate incision;  Surgeon: Princess Escobar MD;  [facility];  Service: Neurosurgery    MI REVISE/REMOVE SPINAL NEUROSTIM/ Right 7/18/2017    Procedure: REMOVAL RIGHT FLANK SPINAL COLUMN STIMULATOR IPG;  Surgeon: Princess Escobar MD;  [facility];  Service: Neurosurgery    MI REVISE/REMOVE SPINAL NEUROSTIM/ Bilateral 3/1/2019    Procedure: REMOVAL OF IMPLANTABLE PULSE GENERATOR (IPG) DORSAL SPINAL COLUMN STIMULATOR  (DCS) AND GENERATOR OF CERVICAL AND THORACIC (NEUROMONITORING);   Surgeon: Mere Carvalho MD;  [facility];  Service: Neurosurgery    PA SURG IMPLNT [address] N/A 2016    Procedure: LAMINECTOMY  T10 -6 WITH  PLACEMENT OF  SPINAL CORD STIMULATOR AND PULSE GEBNERATOR  IMPULSE MONITORING ;  Surgeon: Matthew Dey MD;  Location[facility];  Service: Orthopedics    TUBAL LIGATION Bilateral     WISDOM TOOTH EXTRACTION         Family History   Adopted: Yes     I have reviewed and agree with the history as documented  E-Cigarette/Vaping    E-Cigarette Use Never User      E-Cigarette/Vaping Substances    Nicotine No     THC No     CBD No      Social History     Tobacco Use    Smoking status: Former Smoker     Packs/day: 0 50     Years: 5 00     Pack years: 2 50     Types: Cigarettes     Last attempt to quit: 2010     Years since quittin 7    Smokeless tobacco: Former User   Substance Use Topics    Alcohol use: Yes     Frequency: Monthly or less     Drinks per session: 1 or 2     Binge frequency: Never     Comment: Holidays    Drug use: No       Review of Systems   Constitutional: Negative for chills, diaphoresis and fever  HENT: Negative for congestion and rhinorrhea  Eyes: Positive for photophobia, pain, discharge and visual disturbance  Respiratory: Negative for cough, shortness of breath and wheezing  Cardiovascular: Negative for chest pain and leg swelling  Gastrointestinal: Negative for abdominal pain, diarrhea, nausea and vomiting  Genitourinary: Negative for difficulty urinating, dysuria, frequency and urgency  Musculoskeletal: Negative for back pain and neck pain  Skin: Negative for color change and rash  Neurological: Positive for headaches  Negative for syncope and numbness  All other systems reviewed and are negative  Physical Exam  Physical Exam   Constitutional: She is oriented to person, place, and time  She appears well-developed and well-nourished  HENT:   Head: Normocephalic and atraumatic     Eyes: Pupils are equal, round, and reactive to light  Conjunctivae and EOM are normal    No afferent pupillary defect  Pupils equal reactive  Visual fields intact in all 4 quadrants  B/l globe pressure 12-13  20/20 R eye, 20/70 L eye, similar to prior 2 days ago  Neck: Normal range of motion  Neck supple  Cardiovascular: Normal rate and regular rhythm  Pulmonary/Chest: Effort normal and breath sounds normal  No respiratory distress  She has no wheezes  She has no rales  Abdominal: Soft  Bowel sounds are normal  There is no tenderness  There is no guarding  Musculoskeletal: Normal range of motion  She exhibits no edema or tenderness  5/5 strength of bilateral upper and lower extremities   Neurological: She is alert and oriented to person, place, and time  No cranial nerve deficit  Skin: Skin is warm  No erythema  Psychiatric: She has a normal mood and affect  Her behavior is normal    Nursing note and vitals reviewed  Vital Signs  ED Triage Vitals [03/07/20 1308]   Temperature Pulse Respirations Blood Pressure SpO2   (!) 97 1 °F (36 2 °C) 88 18 101/57 99 %      Temp Source Heart Rate Source Patient Position - Orthostatic VS BP Location FiO2 (%)   Tympanic Monitor Sitting Left arm --      Pain Score       Worst Possible Pain           Vitals:    03/07/20 1308 03/07/20 1555   BP: 101/57 98/52   Pulse: 88 79   Patient Position - Orthostatic VS: Sitting Lying         Visual Acuity  Visual Acuity      Most Recent Value   Visual acuity R eye is  20/20   Visual acuity Left eye is  20/70   Visual acuity in both eyes is  20/20   Wearing corrective eyewear/lenses?   No   No corrective eyewear/lenses  Yes   L Pupil Size (mm)  3   R Pupil Size (mm)  3          ED Medications  Medications   ketorolac (TORADOL) injection 15 mg (15 mg Intravenous Given 3/7/20 1411)   metoclopramide (REGLAN) injection 10 mg (10 mg Intravenous Given 3/7/20 1419)   diphenhydrAMINE (BENADRYL) injection 25 mg (25 mg Intravenous Given 3/7/20 1406)   predniSONE tablet 50 mg (50 mg Oral Given 3/7/20 1528)       Diagnostic Studies  Results Reviewed     None                 No orders to display              Procedures  Procedures         ED Course  ED Course as of Mar 07 1658   Sat Mar 07, 2020   1540 Patient reports improvement of symptoms  Agreeable with plan of 5 day course of steroids  Patient states she really does not want to be admitted to the hospital   Will follow up with ophthalmologist early next week  Strict return precautions provided  MDM  Number of Diagnoses or Management Options  Headache:   Diagnosis management comments: 42-year-old female presenting with left-sided headache and possible optic neuritis  Patient does not have an afferent pupillary defect  Bilateral globe pressures are within normal limits  Pupils equal reactive without worsening changes in vision  These physical exam findings are not consistent with optic neuritis  Discussed case with Neurology and agreeable that symptoms are more likely complex migraine  Will administer pain control and reassess  Disposition  Final diagnoses:   Headache     Time reflects when diagnosis was documented in both MDM as applicable and the Disposition within this note     Time User Action Codes Description Comment    3/7/2020  3:45 PM [place], [address] Headache       ED Disposition     ED Disposition Condition Date/Time Comment    Discharge Stable Sat Mar 7, 2020  3:45 PM [place] discharge to home/self care  Follow-up Information     Follow up With Specialties Details Why Georgie Johnson MD Ophthalmology In 2 days  [address]  [phone]      [facility] Emergency Department Emergency Medicine  If symptoms worsen [address] [address] [phone]          Discharge Medication List as of 3/7/2020  3:47 PM      START taking these medications    Details   predniSONE 50 mg tablet Take 1 tablet (50 mg total) by mouth daily for 4 days, Starting Sat 3/7/2020, Until Wed 3/11/2020, Print         CONTINUE these medications which have NOT CHANGED    Details   albuterol (PROVENTIL HFA,VENTOLIN HFA) 90 mcg/act inhaler Inhale 2 puffs as needed for wheezing, Starting Fri 4/26/2019, Normal      baclofen 10 mg tablet Take 10 mg by mouth 4 (four) times a day  , Historical Med      benzonatate (TESSALON PERLES) 100 mg capsule Take 1 capsule (100 mg total) by mouth every 8 (eight) hours, Starting Tue 2/18/2020, Print      DULoxetine (CYMBALTA) 30 mg delayed release capsule Take 30 mg by mouth 2 (two) times a day , Historical Med      EPINEPHrine (EPIPEN 2-HERNAN) 0 3 mg/0 3 mL SOAJ Inject 0 3 mL (0 3 mg total) into a muscle once for 1 dose, Starting Wed 6/12/2019, Normal      ergocalciferol (VITAMIN D2) 50,000 units Take 1 capsule (50,000 Units total) by mouth once a week, Starting Wed 2/6/2019, Normal      fluticasone (FLONASE) 50 mcg/act nasal spray 1 spray into each nostril daily, Starting Tue 2/18/2020, Print      gabapentin (NEURONTIN) 600 MG tablet 2 tablets 3 (three) times a day, Starting Tue 12/1/2015, Historical Med      guaiFENesin (MUCINEX) 600 mg 12 hr tablet Take 2 tablets (1,200 mg total) by mouth every 12 (twelve) hours, Starting Tue 2/18/2020, Print           No discharge procedures on file      PDMP Review     None          ED Provider  Electronically Signed by           Ge Goss DO  03/07/20 7504

## 2020-03-20 ENCOUNTER — APPOINTMENT (EMERGENCY)
Dept: RADIOLOGY | Facility: HOSPITAL | Age: 45
End: 2020-03-20
Payer: COMMERCIAL

## 2020-03-20 ENCOUNTER — HOSPITAL ENCOUNTER (EMERGENCY)
Facility: HOSPITAL | Age: 45
Discharge: HOME | End: 2020-03-20
Attending: EMERGENCY MEDICINE | Admitting: EMERGENCY MEDICINE
Payer: COMMERCIAL

## 2020-03-20 VITALS
SYSTOLIC BLOOD PRESSURE: 106 MMHG | HEART RATE: 86 BPM | BODY MASS INDEX: 23.35 KG/M2 | WEIGHT: 142.5 LBS | OXYGEN SATURATION: 99 % | RESPIRATION RATE: 17 BRPM | DIASTOLIC BLOOD PRESSURE: 65 MMHG | TEMPERATURE: 96.9 F

## 2020-03-20 DIAGNOSIS — R06.02 SOB (SHORTNESS OF BREATH): ICD-10-CM

## 2020-03-20 DIAGNOSIS — J45.901 ASTHMA EXACERBATION: Primary | ICD-10-CM

## 2020-03-20 DIAGNOSIS — E87.6 HYPOKALEMIA: ICD-10-CM

## 2020-03-20 LAB
ALBUMIN SERPL BCP-MCNC: 4.2 G/DL (ref 3–5.2)
ALP SERPL-CCNC: 50 U/L (ref 43–122)
ALT SERPL W P-5'-P-CCNC: 22 U/L (ref 9–52)
ANION GAP SERPL CALCULATED.3IONS-SCNC: 9 MMOL/L (ref 5–14)
AST SERPL W P-5'-P-CCNC: 18 U/L (ref 14–36)
ATRIAL RATE: 109 BPM
BASOPHILS # BLD AUTO: 0 THOUSANDS/ΜL (ref 0–0.1)
BASOPHILS NFR BLD AUTO: 0 % (ref 0–1)
BILIRUB SERPL-MCNC: 0.4 MG/DL
BUN SERPL-MCNC: 8 MG/DL (ref 5–25)
CALCIUM SERPL-MCNC: 9 MG/DL (ref 8.4–10.2)
CHLORIDE SERPL-SCNC: 104 MMOL/L (ref 97–108)
CO2 SERPL-SCNC: 26 MMOL/L (ref 22–30)
CREAT SERPL-MCNC: 0.72 MG/DL (ref 0.6–1.2)
EOSINOPHIL # BLD AUTO: 0 THOUSAND/ΜL (ref 0–0.4)
EOSINOPHIL NFR BLD AUTO: 0 % (ref 0–6)
ERYTHROCYTE [DISTWIDTH] IN BLOOD BY AUTOMATED COUNT: 13.9 %
FLUAV RNA NPH QL NAA+PROBE: NORMAL
FLUBV RNA NPH QL NAA+PROBE: NORMAL
GFR SERPL CREATININE-BSD FRML MDRD: 101 ML/MIN/1.73SQ M
GLUCOSE SERPL-MCNC: 86 MG/DL (ref 70–99)
HCT VFR BLD AUTO: 40.2 % (ref 36–46)
HGB BLD-MCNC: 13.3 G/DL (ref 12–16)
LYMPHOCYTES # BLD AUTO: 1.4 THOUSANDS/ΜL (ref 0.5–4)
LYMPHOCYTES NFR BLD AUTO: 12 % (ref 25–45)
MCH RBC QN AUTO: 28.7 PG (ref 26–34)
MCHC RBC AUTO-ENTMCNC: 33.2 G/DL (ref 31–36)
MCV RBC AUTO: 87 FL (ref 80–100)
MONOCYTES # BLD AUTO: 0.7 THOUSAND/ΜL (ref 0.2–0.9)
MONOCYTES NFR BLD AUTO: 6 % (ref 1–10)
NEUTROPHILS # BLD AUTO: 9.9 THOUSANDS/ΜL (ref 1.8–7.8)
NEUTS SEG NFR BLD AUTO: 82 % (ref 45–65)
P AXIS: 82 DEGREES
PLATELET # BLD AUTO: 258 THOUSANDS/UL (ref 150–450)
PMV BLD AUTO: 9.2 FL (ref 8.9–12.7)
POTASSIUM SERPL-SCNC: 3.4 MMOL/L (ref 3.6–5)
PR INTERVAL: 160 MS
PROT SERPL-MCNC: 7.2 G/DL (ref 5.9–8.4)
QRS AXIS: 86 DEGREES
QRSD INTERVAL: 82 MS
QT INTERVAL: 336 MS
QTC INTERVAL: 452 MS
RBC # BLD AUTO: 4.64 MILLION/UL (ref 4–5.2)
RSV RNA NPH QL NAA+PROBE: NORMAL
SODIUM SERPL-SCNC: 139 MMOL/L (ref 137–147)
T WAVE AXIS: 69 DEGREES
VENTRICULAR RATE: 109 BPM
WBC # BLD AUTO: 12.1 THOUSAND/UL (ref 4.5–11)

## 2020-03-20 PROCEDURE — 99284 EMERGENCY DEPT VISIT MOD MDM: CPT | Performed by: EMERGENCY MEDICINE

## 2020-03-20 PROCEDURE — 80053 COMPREHEN METABOLIC PANEL: CPT | Performed by: EMERGENCY MEDICINE

## 2020-03-20 PROCEDURE — 87631 RESP VIRUS 3-5 TARGETS: CPT | Performed by: EMERGENCY MEDICINE

## 2020-03-20 PROCEDURE — 36415 COLL VENOUS BLD VENIPUNCTURE: CPT | Performed by: EMERGENCY MEDICINE

## 2020-03-20 PROCEDURE — 93010 ELECTROCARDIOGRAM REPORT: CPT | Performed by: INTERNAL MEDICINE

## 2020-03-20 PROCEDURE — 71045 X-RAY EXAM CHEST 1 VIEW: CPT

## 2020-03-20 PROCEDURE — 94640 AIRWAY INHALATION TREATMENT: CPT | Performed by: EMERGENCY MEDICINE

## 2020-03-20 PROCEDURE — 99285 EMERGENCY DEPT VISIT HI MDM: CPT

## 2020-03-20 PROCEDURE — 85025 COMPLETE CBC W/AUTO DIFF WBC: CPT | Performed by: EMERGENCY MEDICINE

## 2020-03-20 PROCEDURE — 93005 ELECTROCARDIOGRAM TRACING: CPT

## 2020-03-20 PROCEDURE — 94640 AIRWAY INHALATION TREATMENT: CPT

## 2020-03-20 RX ORDER — IPRATROPIUM BROMIDE AND ALBUTEROL SULFATE 2.5; .5 MG/3ML; MG/3ML
3 SOLUTION RESPIRATORY (INHALATION) ONCE
Status: COMPLETED | OUTPATIENT
Start: 2020-03-20 | End: 2020-03-20

## 2020-03-20 RX ORDER — MAGNESIUM SULFATE HEPTAHYDRATE 40 MG/ML
2 INJECTION, SOLUTION INTRAVENOUS ONCE
Status: DISCONTINUED | OUTPATIENT
Start: 2020-03-20 | End: 2020-03-20

## 2020-03-20 RX ORDER — POTASSIUM CHLORIDE 750 MG/1
20 TABLET, EXTENDED RELEASE ORAL ONCE
Status: COMPLETED | OUTPATIENT
Start: 2020-03-20 | End: 2020-03-20

## 2020-03-20 RX ORDER — ALBUTEROL SULFATE 90 UG/1
2 AEROSOL, METERED RESPIRATORY (INHALATION) ONCE
Status: COMPLETED | OUTPATIENT
Start: 2020-03-20 | End: 2020-03-20

## 2020-03-20 RX ADMIN — POTASSIUM CHLORIDE 20 MEQ: 10 TABLET, EXTENDED RELEASE ORAL at 14:55

## 2020-03-20 RX ADMIN — IPRATROPIUM BROMIDE AND ALBUTEROL SULFATE 3 ML: 2.5; .5 SOLUTION RESPIRATORY (INHALATION) at 13:47

## 2020-03-20 RX ADMIN — ALBUTEROL SULFATE 2 PUFF: 90 AEROSOL, METERED RESPIRATORY (INHALATION) at 14:55

## 2020-03-20 NOTE — ED NOTES
Patient refusing IV placement  Allows this RN to do a butterfly stick for lab work  Provider, Dr Deion Mccain, informed        [facility]  03/20/20 0001

## 2020-03-20 NOTE — ED NOTES
Patient states "I feel better, I don't feel tight"  Patient shows no signs or symptoms of distress  Patient playing on her cell phone        Ej Paytonde  03/20/20 9746

## 2020-03-20 NOTE — ED PROVIDER NOTES
History  Chief Complaint   Patient presents with    Shortness of Breath     Lives in shelter states "I got winded when I was walking"  Hx of asthma  Patient is a 80-year-old female with history of asthma coming in today with worsening shortness of breath  Patient has no travel no sick contacts  However, she does live in a shelter  Patient does report that she was feeling well this morning  She was walking around became short of breath  She has never been intubated for her asthma  She cannot remember the last time she was on steroids  She has no fevers, chills, cough  She has no lower extremity edema  She does live in a shelter  She has no palpitations, syncope or chest pain  She does feel tight in the chest from her shortness of breath  History provided by:  Patient   used: No    Shortness of Breath   Severity:  Mild  Onset quality:  Sudden  Timing:  Intermittent  Progression:  Worsening  Chronicity:  New  Context: not activity, not animal exposure, not emotional upset, not fumes, not known allergens, not occupational exposure, not pollens, not smoke exposure, not strong odors, not URI and not weather changes    Relieved by:  None tried  Worsened by:  Nothing  Ineffective treatments:  None tried  Associated symptoms: no abdominal pain, no chest pain, no claudication, no cough, no diaphoresis, no ear pain, no fever, no headaches, no hemoptysis, no neck pain, no PND, no rash, no sore throat, no sputum production, no syncope, no swollen glands, no vomiting and no wheezing    Risk factors: no recent alcohol use, no family hx of DVT, no hx of cancer, no hx of PE/DVT, no obesity, no oral contraceptive use, no prolonged immobilization, no recent surgery and no tobacco use      To help for assist with her shortness  Prior to Admission Medications   Prescriptions Last Dose Informant Patient Reported? Taking?    DULoxetine (CYMBALTA) 30 mg delayed release capsule  Self Yes No   Sig: Take 30 mg by mouth 2 (two) times a day    EPINEPHrine (EPIPEN 2-HERNAN) 0 3 mg/0 3 mL SOAJ   No No   Sig: Inject 0 3 mL (0 3 mg total) into a muscle once for 1 dose   albuterol (PROVENTIL HFA,VENTOLIN HFA) 90 mcg/act inhaler  Self No No   Sig: Inhale 2 puffs as needed for wheezing   baclofen 10 mg tablet  Self Yes No   Sig: Take 10 mg by mouth 4 (four) times a day     benzonatate (TESSALON PERLES) 100 mg capsule   No No   Sig: Take 1 capsule (100 mg total) by mouth every 8 (eight) hours   ergocalciferol (VITAMIN D2) 50,000 units  Self No No   Sig: Take 1 capsule (50,000 Units total) by mouth once a week   fluticasone (FLONASE) 50 mcg/act nasal spray   No No   Si spray into each nostril daily   gabapentin (NEURONTIN) 600 MG tablet  Self Yes No   Si tablets 3 (three) times a day   guaiFENesin (MUCINEX) 600 mg 12 hr tablet   No No   Sig: Take 2 tablets (1,200 mg total) by mouth every 12 (twelve) hours      Facility-Administered Medications: None       Past Medical History:   Diagnosis Date    Anxiety     Asthma     Back pain     Chronic narcotic dependence (HCC)     CRPS (complex regional pain syndrome type I)     CRPS (complex regional pain syndrome type I)     RLE and RUE    [facility] of [facility]    Depression     Fracture     2015 right knee fracture    Latex allergy     Limb alert care status     RUE, RLE    Mobility impaired     uses cane    Optic neuritis, left     PONV (postoperative nausea and vomiting)     RSD (reflex sympathetic dystrophy)     CRPS RUE/RLE    Wears glasses        Past Surgical History:   Procedure Laterality Date    BREAST LUMPECTOMY      Left    IMPLANTATION / PLACEMENT EPIDURAL NEUROSTIMULATOR ELECTRODES Right     KNEE ARTHROSCOPY Right     RI IMPLANT SPINAL NEUROSTIM/ Right 2017    Procedure: PLACEMENT OF NEW SPINAL COLUMN STIMULATOR IN THE RIGHT BUTTOCK THROUGH SEPERATE INCISION WITH TUNNELING OF LEAD; POSSIBLE PLACEMENT OF EXTENSION;  Surgeon: Malik Escamilla MD;  [facility];  Service: Neurosurgery    ID IMPLANT SPINAL NEUROSTIM/ N/A 2018    Procedure: Removal of left flank spinal cord stimulator generator, placement of new spinal cord stimulator generator in left buttock through separate incision;  Surgeon: Malik Escamilla MD;  [facility];  Service: Neurosurgery    ID REVISE/REMOVE SPINAL NEUROSTIM/ Right 2017    Procedure: REMOVAL RIGHT FLANK SPINAL COLUMN STIMULATOR IPG;  Surgeon: Malik Escamilla MD;  [facility];  Service: Neurosurgery    ID REVISE/REMOVE SPINAL NEUROSTIM/ Bilateral 3/1/2019    Procedure: REMOVAL OF IMPLANTABLE PULSE GENERATOR (IPG) DORSAL SPINAL COLUMN STIMULATOR  (DCS) AND GENERATOR OF CERVICAL AND THORACIC (NEUROMONITORING); Surgeon: Samuel Mcfarlane MD;  [facility];  Service: Neurosurgery    ID SURG IMPLNT [address] N/A 2016    Procedure: LAMINECTOMY  T10 -11 WITH  PLACEMENT OF  SPINAL CORD STIMULATOR AND PULSE GEBNERATOR  IMPULSE MONITORING ;  Surgeon: Diane Wagner MD;  [facility];  Service: Orthopedics    TUBAL LIGATION Bilateral     WISDOM TOOTH EXTRACTION         Family History   Adopted: Yes     I have reviewed and agree with the history as documented  E-Cigarette/Vaping    E-Cigarette Use Never User      E-Cigarette/Vaping Substances    Nicotine No     THC No     CBD No      Social History     Tobacco Use    Smoking status: Former Smoker     Packs/day: 0 50     Years: 5 00     Pack years: 2 50     Types: Cigarettes     Last attempt to quit: 2010     Years since quittin 7    Smokeless tobacco: Former User   Substance Use Topics    Alcohol use: Yes     Frequency: Monthly or less     Drinks per session: 1 or 2     Binge frequency: Never     Comment: Holidays    Drug use: No       Review of Systems   Constitutional: Negative    Negative for diaphoresis and fever  HENT: Negative  Negative for ear pain and sore throat  Eyes: Negative  Negative for visual disturbance  Respiratory: Positive for shortness of breath  Negative for cough, hemoptysis, sputum production, chest tightness and wheezing  Cardiovascular: Negative  Negative for chest pain, palpitations, claudication, syncope and PND  Gastrointestinal: Negative  Negative for abdominal pain, nausea and vomiting  Genitourinary: Negative  Negative for difficulty urinating and dysuria  Musculoskeletal: Negative  Negative for back pain and neck pain  Skin: Negative for rash  Neurological: Negative  Negative for weakness and headaches  Hematological: Negative  Psychiatric/Behavioral: Negative  Negative for confusion  All other systems reviewed and are negative  Physical Exam  Physical Exam   Constitutional: She is oriented to person, place, and time  She appears well-developed and well-nourished  No distress  HENT:   Head: Normocephalic and atraumatic  Mouth/Throat: Oropharynx is clear and moist    Patient maintaining airway and secretions  No uvular edema and uvula is midline  No brawniness under the tongue and no stridor   Eyes: Pupils are equal, round, and reactive to light  Conjunctivae and EOM are normal    Neck: Normal range of motion  Neck supple  Cardiovascular: Regular rhythm, normal heart sounds and intact distal pulses  Tachycardia present  No murmur heard  Pulses:       Radial pulses are 2+ on the right side, and 2+ on the left side  Dorsalis pedis pulses are 2+ on the right side, and 2+ on the left side  Pulmonary/Chest: Effort normal and breath sounds normal  No stridor  No respiratory distress  Abdominal: Soft  Bowel sounds are normal  She exhibits no distension  There is no tenderness  Musculoskeletal: Normal range of motion  She exhibits no edema     Patient has full active range of motion of the bilateral upper extremities and lower extremities moves independently of each other   Neurological: She is alert and oriented to person, place, and time  No cranial nerve deficit  GCS 15  No slurred speech  No facial asymmetry   Skin: Skin is warm  Capillary refill takes less than 2 seconds  She is not diaphoretic  Nursing note and vitals reviewed        Vital Signs  ED Triage Vitals [03/20/20 1315]   Temperature Pulse Respirations Blood Pressure SpO2   (!) 96 9 °F (36 1 °C) (!) 112 20 140/86 98 %      Temp Source Heart Rate Source Patient Position - Orthostatic VS BP Location FiO2 (%)   Tympanic Monitor Lying Left arm --      Pain Score       --           Vitals:    03/20/20 1315 03/20/20 1457   BP: 140/86 106/65   Pulse: (!) 112 86   Patient Position - Orthostatic VS: Lying Sitting         Visual Acuity      ED Medications  Medications   ipratropium-albuterol (DUO-NEB) 0 5-2 5 mg/3 mL inhalation solution 3 mL (3 mL Nebulization Given 3/20/20 1347)   albuterol (PROVENTIL HFA,VENTOLIN HFA) inhaler 2 puff (2 puffs Inhalation Given 3/20/20 1455)   potassium chloride (K-DUR,KLOR-CON) CR tablet 20 mEq (20 mEq Oral Given 3/20/20 1455)       Diagnostic Studies  Results Reviewed     Procedure Component Value Units Date/Time    Influenza A/B and RSV PCR [[id number]]  (Normal) Collected:  03/20/20 1349    Lab Status:  Final result Specimen:  Nasopharyngeal Swab Updated:  03/20/20 1448     INFLUENZA A PCR None Detected     INFLUENZA B PCR None Detected     RSV PCR None Detected    Comprehensive metabolic panel [[id number]]  (Abnormal) Collected:  03/20/20 1354    Lab Status:  Final result Specimen:  Blood from Hand, Left Updated:  03/20/20 1426     Sodium 139 mmol/L      Potassium 3 4 mmol/L      Chloride 104 mmol/L      CO2 26 mmol/L      ANION GAP 9 mmol/L      BUN 8 mg/dL      Creatinine 0 72 mg/dL      Glucose 86 mg/dL      Calcium 9 0 mg/dL      AST 18 U/L      ALT 22 U/L      Alkaline Phosphatase 50 U/L      Total Protein 7 2 g/dL      Albumin 4 2 g/dL      Total Bilirubin 0 40 mg/dL      eGFR 101 ml/min/1 73sq m     Narrative:       [place] guidelines for Chronic Kidney Disease (CKD):     Stage 1 with normal or high GFR (GFR > 90 mL/min/1 73 square meters)    Stage 2 Mild CKD (GFR = 60-89 mL/min/1 73 square meters)    Stage 3A Moderate CKD (GFR = 45-59 mL/min/1 73 square meters)    Stage 3B Moderate CKD (GFR = 30-44 mL/min/1 73 square meters)    Stage 4 Severe CKD (GFR = 15-29 mL/min/1 73 square meters)    Stage 5 End Stage CKD (GFR <15 mL/min/1 73 square meters)  Note: GFR calculation is accurate only with a steady state creatinine    CBC and differential [[id number]]  (Abnormal) Collected:  03/20/20 1354    Lab Status:  Final result Specimen:  Blood from Hand, Left Updated:  03/20/20 1412     WBC 12 10 Thousand/uL      RBC 4 64 Million/uL      Hemoglobin 13 3 g/dL      Hematocrit 40 2 %      MCV 87 fL      MCH 28 7 pg      MCHC 33 2 g/dL      RDW 13 9 %      MPV 9 2 fL      Platelets 138 Thousands/uL      Neutrophils Relative 82 %      Lymphocytes Relative 12 %      Monocytes Relative 6 %      Eosinophils Relative 0 %      Basophils Relative 0 %      Neutrophils Absolute 9 90 Thousands/µL      Lymphocytes Absolute 1 40 Thousands/µL      Monocytes Absolute 0 70 Thousand/µL      Eosinophils Absolute 0 00 Thousand/µL      Basophils Absolute 0 00 Thousands/µL                  XR chest 1 view portable   Final Result by Andrea Lucas MD (03/20 1327)      No acute cardiopulmonary disease  Workstation performed: [id number]                    Procedures  Procedures         ED Course  ED Course as of Mar 20 1500   Fri Mar 20, 2020   1317 Patient is a 39 year female coming in via EMS with sob  Patient is tachycardic but otherwise stable in no respiratory distress - will give duoneb, obtain ekg, labs, cxr  Portions of the record may have been created with voice recognition software   Occasional wrong word or "sound a like" substitutions may have occurred due to the inherent limitations of voice recognition software  Read the chart carefully and recognize, using context, where substitutions have occurred  1336 Patient upset and tearful  Reassured patient and redirected her for breathing assistance  Patient tearful  100% on RA  Patient placed on NRB for symptomatic treatement       94 31 11 Patient refused IV and understood need for Magnesium  Patient refused  1427 Patient resting in bed in no distress  She states she is not tight anymore  She understands not receiving magnesium  Labs are stable  Potassium mildly low at 3 4 will replace  Pending influenza and RSV      1449 Patient well appearing, no sob and no symptoms  No acute distress  Discussed with patient regarding flu which has resulted as negative  MDI given and will dc home  MDM  Number of Diagnoses or Management Options  Diagnosis management comments:     EKG at 13:17  RHYTHM:  Sinus tachy at 110 beats per minute  AXIS:  Normal axis  INTERVALS:  Pr interval measured at 160 milliseconds  QRS COMPLEX:  QRS measured at 82 milliseconds  ST SEGMENT:  Nonspecific ST segment changes  Diffuse artifact  QT INTERVAL:  QTC measured at 452 ms   COMPARED WITH PRIOR   Juan Arechiga   Interpretation by Barbara Hodges,          Amount and/or Complexity of Data Reviewed  Clinical lab tests: ordered  Tests in the radiology section of CPT®: ordered  Tests in the medicine section of CPT®: ordered          Disposition  Final diagnoses:   Asthma exacerbation   SOB (shortness of breath)   Hypokalemia     Time reflects when diagnosis was documented in both MDM as applicable and the Disposition within this note     Time User Action Codes Description Comment    3/20/2020  2:28 PM Michael Patterson Add [J45 901] Asthma exacerbation     3/20/2020  2:28 PM Vika Kamara Add [R06 02] SOB (shortness of breath)     3/20/2020  2:28 PM Vincent Kamara Add [E87 6] Hypokalemia       ED Disposition     ED Disposition Condition Date/Time Comment    Discharge Stable Fri Mar 20, 2020  2:46 PM [facility] discharge to home/self care  Follow-up Information     Follow up With Specialties Details Why Contact Info    Zehra Vale DO Family Medicine Schedule an appointment as soon as possible for a visit in 1 week  [address] [place] [address] Schedule an appointment as soon as possible for a visit in 1 week  [facility] [address] [facility]  [place] [place]  [address]             Patient's Medications   Discharge Prescriptions    No medications on file     No discharge procedures on file      PDMP Review     None          ED Provider  Electronically Signed by           Leopoldo Cong, DO  03/20/20 600 Kerwin Yo, DO  03/20/20 1500

## 2020-03-20 NOTE — ED NOTES
went to draw the pt blood work asked the pt to breathe slowly through her nose and our her mouth to prevent her throat from becoming sore  Pt was upset, "you're the second person here to say that to me, I dont want you to draw my blood work"  I said that's fine and that Id get the equipment ready for another nurse and would send them in, but it may be a short while because her primary RN was busy  This RN Left the room at this time       Keli Pesron RN  03/20/20 [address] [facility] Narcisa Loza  03/20/20 1754

## 2020-04-20 NOTE — ASSESSMENT & PLAN NOTE
Bogdan Cameron,    Can you please review this note? Thanks!   Minesh Have a procedure to remove the stimulator from her lumbar spine she low risk patient the patient will hold nonsteroidal anti-inflammatory drugs for 7 days before the procedure

## 2020-08-22 ENCOUNTER — OFFICE VISIT (OUTPATIENT)
Dept: URGENT CARE | Facility: CLINIC | Age: 45
End: 2020-08-22
Payer: COMMERCIAL

## 2020-08-22 VITALS
TEMPERATURE: 98.3 F | RESPIRATION RATE: 18 BRPM | HEART RATE: 78 BPM | OXYGEN SATURATION: 100 % | DIASTOLIC BLOOD PRESSURE: 74 MMHG | WEIGHT: 142 LBS | SYSTOLIC BLOOD PRESSURE: 122 MMHG | BODY MASS INDEX: 23.27 KG/M2

## 2020-08-22 DIAGNOSIS — H61.21 IMPACTED CERUMEN OF RIGHT EAR: ICD-10-CM

## 2020-08-22 DIAGNOSIS — H60.501 ACUTE OTITIS EXTERNA OF RIGHT EAR, UNSPECIFIED TYPE: Primary | ICD-10-CM

## 2020-08-22 PROCEDURE — 99213 OFFICE O/P EST LOW 20 MIN: CPT | Performed by: PHYSICIAN ASSISTANT

## 2020-08-22 RX ORDER — CIPROFLOXACIN AND DEXAMETHASONE 3; 1 MG/ML; MG/ML
4 SUSPENSION/ DROPS AURICULAR (OTIC) 2 TIMES DAILY
Qty: 7.5 ML | Refills: 0 | Status: SHIPPED | OUTPATIENT
Start: 2020-08-22 | End: 2020-08-29

## 2020-08-22 NOTE — PATIENT INSTRUCTIONS
Antibiotic ear drops today  Use drops until symptoms resolve, and then for an additional 24 hours  Do not use beyond 1 week  Follow up with PCP or return to clinic if symptoms worsen or do not improve  Otitis Externa   WHAT YOU NEED TO KNOW:   Otitis externa, or swimmer's ear, is an infection in the outer ear canal  This canal goes from the outside of the ear to the eardrum  DISCHARGE INSTRUCTIONS:   Return to the emergency department if:   · You have severe ear pain  · You are suddenly unable to hear at all  · You have new swelling in your face, behind your ears, or in your neck  · You suddenly cannot move part of your face  · Your face suddenly feels numb  Contact your healthcare provider if:   · You have a fever  · Your signs and symptoms do not get better after 2 days of treatment  · Your signs and symptoms go away for a time, but then come back  · You have questions or concerns about your condition or care  Medicines:   · NSAIDs , such as ibuprofen, help decrease swelling, pain, and fever  This medicine is available with or without a doctor's order  NSAIDs can cause stomach bleeding or kidney problems in certain people  If you take blood thinner medicine, always ask if NSAIDs are safe for you  Always read the medicine label and follow directions  Do not give these medicines to children under 10months of age without direction from your child's healthcare provider  · Acetaminophen  decreases pain and fever  It is available without a doctor's order  Ask how much to take and how often to take it  Follow directions  Acetaminophen can cause liver damage if not taken correctly  · Ear drops  that contain an antibiotic may be given  The antibiotic helps treat a bacterial infection  You may also be given steroid medicine  The steroid helps decrease redness, swelling, and pain  · Take your medicine as directed    Contact your healthcare provider if you think your medicine is not helping or if you have side effects  Tell him or her if you are allergic to any medicine  Keep a list of the medicines, vitamins, and herbs you take  Include the amounts, and when and why you take them  Bring the list or the pill bottles to follow-up visits  Carry your medicine list with you in case of an emergency  Follow up with your healthcare provider as directed:  Write down your questions so you remember to ask them during your visits  How to use eardrops:   · Lie down on your side with your infected ear facing up  · Carefully drip the correct number of eardrops into your ear  Have another person help you if possible  · Gently move the outside part of your ear back and forth to help the medicine reach your ear canal      · Stay lying down in the same position (with your ear facing up) for 3 to 5 minutes  Prevent otitis externa:   · Do not put cotton swabs or foreign objects in your ears  · Wrap a clean moist washcloth around your finger, and use it to clean your outer ear and remove extra ear wax  · Use ear plugs when you swim  Dry your outer ears completely after you swim or bathe  © 2017 [facility] Information is for End User's use only and may not be sold, redistributed or otherwise used for commercial purposes  All illustrations and images included in CareNotes® are the copyrighted property of [employer] , [employer]  or Aleksey Mosley  The above information is an  only  It is not intended as medical advice for individual conditions or treatments  Talk to your doctor, nurse or pharmacist before following any medical regimen to see if it is safe and effective for you

## 2020-08-22 NOTE — PROGRESS NOTES
[facility] [facility]        NAME: Damian Willingham is a 39 y o  female  : 1975    MRN: [id number]  DATE: 2020  TIME: 4:47 PM    Assessment and Plan   Acute otitis externa of right ear, unspecified type [H60 501]  1  Acute otitis externa of right ear, unspecified type  ciprofloxacin-dexamethasone (CIPRODEX) otic suspension   2  Impacted cerumen of right ear           Patient Instructions   Patient Instructions     Antibiotic ear drops today  Use drops until symptoms resolve, and then for an additional 24 hours  Do not use beyond 1 week  Follow up with PCP or return to clinic if symptoms worsen or do not improve  Otitis Externa   WHAT YOU NEED TO KNOW:   Otitis externa, or swimmer's ear, is an infection in the outer ear canal  This canal goes from the outside of the ear to the eardrum  DISCHARGE INSTRUCTIONS:   Return to the emergency department if:   · You have severe ear pain  · You are suddenly unable to hear at all  · You have new swelling in your face, behind your ears, or in your neck  · You suddenly cannot move part of your face  · Your face suddenly feels numb  Contact your healthcare provider if:   · You have a fever  · Your signs and symptoms do not get better after 2 days of treatment  · Your signs and symptoms go away for a time, but then come back  · You have questions or concerns about your condition or care  Medicines:   · NSAIDs , such as ibuprofen, help decrease swelling, pain, and fever  This medicine is available with or without a doctor's order  NSAIDs can cause stomach bleeding or kidney problems in certain people  If you take blood thinner medicine, always ask if NSAIDs are safe for you  Always read the medicine label and follow directions  Do not give these medicines to children under 10months of age without direction from your child's healthcare provider  · Acetaminophen  decreases pain and fever   It is available without a doctor's order  Ask how much to take and how often to take it  Follow directions  Acetaminophen can cause liver damage if not taken correctly  · Ear drops  that contain an antibiotic may be given  The antibiotic helps treat a bacterial infection  You may also be given steroid medicine  The steroid helps decrease redness, swelling, and pain  · Take your medicine as directed  Contact your healthcare provider if you think your medicine is not helping or if you have side effects  Tell him or her if you are allergic to any medicine  Keep a list of the medicines, vitamins, and herbs you take  Include the amounts, and when and why you take them  Bring the list or the pill bottles to follow-up visits  Carry your medicine list with you in case of an emergency  Follow up with your healthcare provider as directed:  Write down your questions so you remember to ask them during your visits  How to use eardrops:   · Lie down on your side with your infected ear facing up  · Carefully drip the correct number of eardrops into your ear  Have another person help you if possible  · Gently move the outside part of your ear back and forth to help the medicine reach your ear canal      · Stay lying down in the same position (with your ear facing up) for 3 to 5 minutes  Prevent otitis externa:   · Do not put cotton swabs or foreign objects in your ears  · Wrap a clean moist washcloth around your finger, and use it to clean your outer ear and remove extra ear wax  · Use ear plugs when you swim  Dry your outer ears completely after you swim or bathe  © 2017 [employer] Information is for End User's use only and may not be sold, redistributed or otherwise used for commercial purposes  All illustrations and images included in CareNotes® are the copyrighted property of [employer]  or Aleksey Mosley  The above information is an  only   It is not intended as medical advice for individual conditions or treatments  Talk to your doctor, nurse or pharmacist before following any medical regimen to see if it is safe and effective for you  Chief Complaint     Chief Complaint   Patient presents with    Earache     right x 2 days         History of Present Illness       26-year-old female presents to clinic with complaints of right ear pain and crackling x2 days  Patient does report swimming recently but has not some urged her head, she also denies any Q-tip use  Patient does not have any hearing loss, dizziness, , headache, ear discharge  She tried applying some olive oil to the ear with little relief  Review of Systems   Review of Systems   Constitutional: Negative for chills and fever  HENT: Positive for ear pain  Negative for ear discharge, facial swelling, hearing loss, rhinorrhea, sinus pressure and tinnitus  Respiratory: Negative for cough and shortness of breath  Cardiovascular: Negative for chest pain  Skin: Negative for rash  Neurological: Negative for dizziness and headaches           Current Medications       Current Outpatient Medications:     albuterol (PROVENTIL HFA,VENTOLIN HFA) 90 mcg/act inhaler, Inhale 2 puffs as needed for wheezing, Disp: 1 each, Rfl: 0    baclofen 10 mg tablet, Take 10 mg by mouth 4 (four) times a day  , Disp: , Rfl:     DULoxetine (CYMBALTA) 30 mg delayed release capsule, Take 30 mg by mouth 2 (two) times a day , Disp: , Rfl:     ergocalciferol (VITAMIN D2) 50,000 units, Take 1 capsule (50,000 Units total) by mouth once a week, Disp: 4 capsule, Rfl: 2    fluticasone (FLONASE) 50 mcg/act nasal spray, 1 spray into each nostril daily, Disp: 16 g, Rfl: 0    gabapentin (NEURONTIN) 600 MG tablet, 2 tablets 3 (three) times a day, Disp: , Rfl:     benzonatate (TESSALON PERLES) 100 mg capsule, Take 1 capsule (100 mg total) by mouth every 8 (eight) hours (Patient not taking: Reported on 8/22/2020), Disp: 21 capsule, Rfl: 0   ciprofloxacin-dexamethasone (CIPRODEX) otic suspension, Administer 4 drops to the right ear 2 (two) times a day for 7 days, Disp: 7 5 mL, Rfl: 0    EPINEPHrine (EPIPEN 2-HERNAN) 0 3 mg/0 3 mL SOAJ, Inject 0 3 mL (0 3 mg total) into a muscle once for 1 dose, Disp: 0 3 mL, Rfl: 2    guaiFENesin (MUCINEX) 600 mg 12 hr tablet, Take 2 tablets (1,200 mg total) by mouth every 12 (twelve) hours (Patient not taking: Reported on 8/22/2020), Disp: 30 tablet, Rfl: 0    Current Allergies     Allergies as of 08/22/2020 - Reviewed 08/22/2020   Allergen Reaction Noted    Bee venom Anaphylaxis 06/22/2016    Codeine Anaphylaxis 04/22/2012    Hydrocodone-guaifenesin Anaphylaxis 04/28/2015    Iodinated diagnostic agents Anaphylaxis 04/28/2015    Iodine Anaphylaxis 04/22/2012    Latex Anaphylaxis 06/22/2016    Other Anaphylaxis 12/25/2015    Oxycodone hcl Anaphylaxis 04/22/2012    Penicillins Anaphylaxis 10/04/2012    Percocet [oxycodone-acetaminophen] Anaphylaxis 07/13/2017    Robaxin [methocarbamol] Other (See Comments) 01/09/2017    Shellfish-derived products Anaphylaxis 06/22/2016    Clindamycin Rash 04/24/2018            The following portions of the patient's history were reviewed and updated as appropriate: allergies, current medications, past family history, past medical history, past social history, past surgical history and problem list      Past Medical History:   Diagnosis Date    Anxiety     Asthma     Back pain     Chronic narcotic dependence ([address] )     CRPS (complex regional pain syndrome type I)     CRPS (complex regional pain syndrome type I)     RLE and RUE    Current mild episode of major depressive disorder without prior episode ([address] ) 10/15/2018    Depression     Fracture     2015 right knee fracture    Latex allergy     Limb alert care status     RUE, RLE    Mobility impaired     uses cane    Optic neuritis, left     PONV (postoperative nausea and vomiting)     RSD (reflex sympathetic dystrophy)     CRPS RUE/RLE    Wears glasses        Past Surgical History:   Procedure Laterality Date    BREAST LUMPECTOMY      Left    IMPLANTATION / PLACEMENT EPIDURAL NEUROSTIMULATOR ELECTRODES Right     KNEE ARTHROSCOPY Right     CO IMPLANT SPINAL NEUROSTIM/ Right 7/18/2017    Procedure: PLACEMENT OF NEW SPINAL COLUMN STIMULATOR IN THE RIGHT BUTTOCK THROUGH SEPERATE INCISION WITH TUNNELING OF LEAD; POSSIBLE PLACEMENT OF EXTENSION;  Surgeon: Nida Bird MD;  [facility];  Service: Neurosurgery    CO IMPLANT SPINAL NEUROSTIM/ N/A 4/25/2018    Procedure: Removal of left flank spinal cord stimulator generator, placement of new spinal cord stimulator generator in left buttock through separate incision;  Surgeon: Nida Bird MD;  [facility];  Service: Neurosurgery    CO REVISE/REMOVE SPINAL NEUROSTIM/ Right 7/18/2017    Procedure: REMOVAL RIGHT FLANK SPINAL COLUMN STIMULATOR IPG;  Surgeon: Nida Bird MD;  [facility];  Service: Neurosurgery    CO REVISE/REMOVE SPINAL NEUROSTIM/ Bilateral 3/1/2019    Procedure: REMOVAL OF IMPLANTABLE PULSE GENERATOR (IPG) DORSAL SPINAL COLUMN STIMULATOR  (DCS) AND GENERATOR OF CERVICAL AND THORACIC (NEUROMONITORING); Surgeon: Saintclair Sports, MD;  [facility];  Service: Neurosurgery    CO SURG IMPLNT [address] N/A 7/14/2016    Procedure: LAMINECTOMY  T10 -11 WITH  PLACEMENT OF  SPINAL CORD STIMULATOR AND PULSE GEBNERATOR  IMPULSE MONITORING ;  Surgeon: Araceli Stevens MD;  Location[facility];  Service: Orthopedics    TUBAL LIGATION Bilateral 2003    WISDOM TOOTH EXTRACTION         Family History   Adopted: Yes         Medications have been verified  Objective   /74   Pulse 78   Temp 98 3 °F (36 8 °C)   Resp 18   Wt 64 4 kg (142 lb)   SpO2 100%   BMI 23 27 kg/m²        Physical Exam     Physical Exam  Vitals signs reviewed     Constitutional:       Appearance: Normal appearance  She is not toxic-appearing  HENT:      Head: Normocephalic and atraumatic  Right Ear: No decreased hearing noted  Swelling and tenderness present  No drainage  There is impacted cerumen  No mastoid tenderness  Left Ear: No decreased hearing noted  There is impacted cerumen  Cardiovascular:      Rate and Rhythm: Normal rate and regular rhythm  Pulses: Normal pulses  Pulmonary:      Effort: Pulmonary effort is normal       Breath sounds: Normal breath sounds  Skin:     General: Skin is warm and dry  Findings: No rash  Neurological:      Mental Status: She is alert

## 2025-04-22 ENCOUNTER — OFFICE VISIT (OUTPATIENT)
Dept: FAMILY MEDICINE CLINIC | Facility: HOME HEALTHCARE | Age: 50
End: 2025-04-22
Payer: COMMERCIAL

## 2025-04-22 VITALS
SYSTOLIC BLOOD PRESSURE: 96 MMHG | WEIGHT: 176.6 LBS | HEART RATE: 78 BPM | DIASTOLIC BLOOD PRESSURE: 68 MMHG | OXYGEN SATURATION: 97 % | BODY MASS INDEX: 28.38 KG/M2 | HEIGHT: 66 IN | TEMPERATURE: 98.2 F | RESPIRATION RATE: 18 BRPM

## 2025-04-22 DIAGNOSIS — T78.2XXA ANAPHYLAXIS, INITIAL ENCOUNTER: ICD-10-CM

## 2025-04-22 DIAGNOSIS — R73.09 OTHER ABNORMAL GLUCOSE: ICD-10-CM

## 2025-04-22 DIAGNOSIS — Z83.430 FAMILY HISTORY OF ELEVATED LIPOPROTEIN(A): ICD-10-CM

## 2025-04-22 DIAGNOSIS — G90.50 COMPLEX REGIONAL PAIN SYNDROME TYPE 1, AFFECTING UNSPECIFIED SITE: Primary | ICD-10-CM

## 2025-04-22 DIAGNOSIS — Z12.31 ENCOUNTER FOR SCREENING MAMMOGRAM FOR MALIGNANT NEOPLASM OF BREAST: ICD-10-CM

## 2025-04-22 DIAGNOSIS — M79.662 PAIN OF LEFT LOWER LEG: ICD-10-CM

## 2025-04-22 DIAGNOSIS — M54.16 LUMBAR RADICULOPATHY: ICD-10-CM

## 2025-04-22 PROCEDURE — 99214 OFFICE O/P EST MOD 30 MIN: CPT

## 2025-04-22 RX ORDER — FLUTICASONE PROPIONATE AND SALMETEROL 250; 50 UG/1; UG/1
POWDER RESPIRATORY (INHALATION)
COMMUNITY
Start: 2025-04-07

## 2025-04-22 RX ORDER — EPINEPHRINE 0.3 MG/.3ML
0.3 INJECTION SUBCUTANEOUS ONCE
Qty: 0.3 ML | Refills: 2 | Status: SHIPPED | OUTPATIENT
Start: 2025-04-22 | End: 2025-04-22

## 2025-04-22 RX ORDER — LORAZEPAM 0.5 MG/1
0.5 TABLET ORAL EVERY 6 HOURS PRN
COMMUNITY

## 2025-04-22 RX ORDER — ASPIRIN 81 MG/1
81 TABLET ORAL DAILY
COMMUNITY

## 2025-04-22 NOTE — ASSESSMENT & PLAN NOTE
Trial cervical stimulator before with no pain relief.   MRI lumbar 03/2025: multilevel degenerative changes. No stenosis  Per [facility] pain medicine, patient was on short course of tramadol and recommended epidural steroid injection or hip injection     - continue on gabapentin 3600mg daily  - refer to ortho and pain medicine  Orders:    Ambulatory Referral to Orthopedic Surgery; Future    Ambulatory referral to Spine & Pain Management; Future

## 2025-04-22 NOTE — ASSESSMENT & PLAN NOTE
Orders:    Ambulatory Referral to Orthopedic Surgery; Future    Ambulatory referral to Spine & Pain Management; Future

## 2025-04-22 NOTE — ASSESSMENT & PLAN NOTE
Orders:    Comprehensive metabolic panel; Future    CBC and differential; Future    Hemoglobin A1C; Future    Lipid panel; Future    TSH, 3rd generation with Free T4 reflex; Future    Ambulatory Referral to Orthopedic Surgery; Future    Ambulatory referral to Spine & Pain Management; Future

## 2025-04-22 NOTE — ASSESSMENT & PLAN NOTE
BMI Counseling: Body mass index is 28.94 kg/m². The BMI is above normal. Exercise recommendations include strength training exercises.

## 2025-04-22 NOTE — PROGRESS NOTES
Name: Chastity Shaw      : 1975      MRN: [id number]  Encounter Provider: Danika Moralez DO  Encounter Date: 2025   Encounter department: [facility]  :  Assessment & Plan  Complex regional pain syndrome type 1, affecting unspecified site  Hx of CRP type 1 of R lower extremity since 2016.   Last f/u [facility] Dr. Garcia - Pain Medicine and Dr. Bruce - Ortho   MRI R HIP 2025: anterior labral tear with an adjacent 7 mm  para labral cyst. Mild R hip OA, mild hamstring tendinosis.  Per patient, she wants to try hip surgery but previous ortho doesn't offer the option  Patient is currently on 3600mg gabapentin daily without relief  She's interested in seeing new [facility] pain and ortho specialist as pain is uncontrolled and unable to ambulate with stability     - referral to ortho and pain medicine  Orders:    Comprehensive metabolic panel; Future    CBC and differential; Future    Hemoglobin A1C; Future    Lipid panel; Future    TSH, 3rd generation with Free T4 reflex; Future    Ambulatory Referral to Orthopedic Surgery; Future    Ambulatory referral to Spine & Pain Management; Future    Pain of left lower leg  Acute L lower leg pain with swelling started few days ago  Patient has been limited with activities due to R hip pain, CRP  Denied SOB, CP, long distance traveling      - order venous duplex of L lower leg  - f/u result in 4 weeks  Orders:    VAS VENOUS DUPLEX -LOWER LIMB UNILATERAL; Future    Lumbar radiculopathy  Trial cervical stimulator before with no pain relief.   MRI lumbar 2025: multilevel degenerative changes. No stenosis  Per [facility] pain medicine, patient was on short course of tramadol and recommended epidural steroid injection or hip injection     - continue on gabapentin 3600mg daily  - refer to ortho and pain medicine  Orders:    Ambulatory Referral to Orthopedic Surgery; Future    Ambulatory referral to Spine & Pain Management;  Future    Encounter for screening mammogram for malignant neoplasm of breast  Last mammogram in 2021     - referral for updated mammogram provided  Orders:    Mammo screening bilateral w 3d and cad; Future    Family history of elevated lipoprotein(a)  Hx of MVP on father's side      - check lipid profile and thyroid profile  - f/u in 4 weeks  Orders:    Lipid panel; Future    TSH, 3rd generation with Free T4 reflex; Future    Other abnormal glucose  Previous borderline elevated glucose on prior lab  - check A1c  Orders:    Hemoglobin A1C; Future    Anaphylaxis, initial encounter  Uses epinephrine prn for allergic reaction  - refill provided  Orders:    EPINEPHrine (EpiPen 2-Cas) 0.3 mg/0.3 mL SOAJ; Inject 0.3 mL (0.3 mg total) into a muscle once for 1 dose    BMI 29.0-29.9,adult  BMI Counseling: Body mass index is 28.94 kg/m². The BMI is above normal. Exercise recommendations include strength training exercises.                  History of Present Illness   HPI  Chastity Shaw is a 51 yo female with hx of Anxiety, chronic pain syndrome, R hip pain, GERD, lumbar radiculopathy, vit D deficiency presents to [facility] care. Patient has long hx of R hip pain, complex regional pain syndrome type 1 (CRP). She followed up with [facility] Dr. Bruce (ortho) and Dr. Garcia (pain medicine) before but wasn't able to control her pain. She is interested in surgical intervention and would like new referral to [facility] ortho and pain medicine. She is taking 3600mg gabapentin daily with no pain relief. Recent MRI R hip done on 02/2025 by [facility] shows R anterior labral tear, mild R hip OA, and hamstring tendinosis. MRI lumbar shows multilevel degenerative changes. Patient doesn't have good stability with ambulation and has fallen couple times in the past one year.   She also notices acute L lower leg pain with occasional swelling for past few days. Denied recent long distance travel. She is limited with activity due to chronic R hip  "pain thus she hasn't been able to move around often.     Review of Systems   Constitutional:  Negative for chills and fever.   Respiratory:  Negative for cough and shortness of breath.    Cardiovascular:  Negative for chest pain and palpitations.   Gastrointestinal:  Negative for abdominal pain and vomiting.   Genitourinary:  Negative for dysuria and hematuria.   Musculoskeletal:  Positive for arthralgias, back pain and gait problem.        Lower back pain, L lower calf pain, R hip pain   Skin:  Negative for color change and rash.   Neurological:  Negative for dizziness and headaches.   All other systems reviewed and are negative.      Objective   BP 96/68 (BP Location: Left arm, Patient Position: Sitting, Cuff Size: Standard)   Pulse 78   Temp 98.2 °F (36.8 °C) (Tympanic)   Resp 18   Ht 5' 5.5\" (1.664 m)   Wt 80.1 kg (176 lb 9.6 oz)   SpO2 97%   BMI 28.94 kg/m²      Physical Exam  Vitals and nursing note reviewed.   Constitutional:       General: She is not in acute distress.     Appearance: Normal appearance. She is well-developed. She is not ill-appearing.   HENT:      Head: Normocephalic and atraumatic.      Right Ear: Tympanic membrane, ear canal and external ear normal. There is no impacted cerumen.      Left Ear: Tympanic membrane, ear canal and external ear normal. There is no impacted cerumen.      Nose: Nose normal. No congestion.      Mouth/Throat:      Mouth: Mucous membranes are moist.      Pharynx: Oropharynx is clear. No oropharyngeal exudate or posterior oropharyngeal erythema.   Eyes:      General:         Right eye: No discharge.         Left eye: No discharge.      Extraocular Movements: Extraocular movements intact.      Conjunctiva/sclera: Conjunctivae normal.   Cardiovascular:      Rate and Rhythm: Normal rate and regular rhythm.      Pulses: Normal pulses.      Heart sounds: Normal heart sounds. No murmur heard.  Pulmonary:      Effort: Pulmonary effort is normal. No respiratory " distress.      Breath sounds: Normal breath sounds. No wheezing.   Abdominal:      General: Abdomen is flat. Bowel sounds are normal. There is no distension.      Palpations: Abdomen is soft.      Tenderness: There is no abdominal tenderness. There is no guarding.   Musculoskeletal:         General: Tenderness present. No swelling. Normal range of motion.      Cervical back: Normal range of motion and neck supple.      Right lower leg: No edema.      Left lower leg: Edema present.      Comments: Trace edema of LLL, Positive homans sign for L lower leg, tenderness with squeezing the calf; R hip pain with any range of motion.    Skin:     General: Skin is warm and dry.      Capillary Refill: Capillary refill takes less than 2 seconds.      Findings: No rash.   Neurological:      General: No focal deficit present.      Mental Status: She is alert and oriented to person, place, and time.   Psychiatric:         Mood and Affect: Mood normal.       Administrative Statements   I have spent a total time of 30 minutes in caring for this patient on the day of the visit/encounter including Impressions, Counseling / Coordination of care, Documenting in the medical record, Reviewing/placing orders in the medical record (including tests, medications, and/or procedures), and Obtaining or reviewing history  .

## 2025-04-22 NOTE — ASSESSMENT & PLAN NOTE
Hx of CRP type 1 of R lower extremity since 2016.   Last f/u [facility] Dr. Garcia - Pain Medicine and Dr. Bruce - Ortho   MRI R HIP 02/2025: anterior labral tear with an adjacent 7 mm  para labral cyst. Mild R hip OA, mild hamstring tendinosis.  Per patient, she wants to try hip surgery but previous ortho doesn't offer the option  Patient is currently on 3600mg gabapentin daily without relief  She's interested in seeing new [facility] pain and ortho specialist as pain is uncontrolled and unable to ambulate with stability     - referral to ortho and pain medicine  Orders:    Comprehensive metabolic panel; Future    CBC and differential; Future    Hemoglobin A1C; Future    Lipid panel; Future    TSH, 3rd generation with Free T4 reflex; Future    Ambulatory Referral to Orthopedic Surgery; Future    Ambulatory referral to Spine & Pain Management; Future

## 2025-04-22 NOTE — PROGRESS NOTES
"Name: Chastity Shaw      : 1975      MRN: [id number]  Encounter Provider: Danika Moralez DO  Encounter Date: 2025   Encounter department: [facility]  :  Assessment & Plan  Complex regional pain syndrome type 1, affecting unspecified site    Orders:    Comprehensive metabolic panel; Future    CBC and differential; Future    Hemoglobin A1C; Future    Lipid panel; Future    TSH, 3rd generation with Free T4 reflex; Future    Ambulatory Referral to Orthopedic Surgery; Future    Ambulatory referral to Spine & Pain Management; Future    Pain of left lower leg    Orders:    VAS VENOUS DUPLEX -LOWER LIMB UNILATERAL; Future    Lumbar radiculopathy    Orders:    Ambulatory Referral to Orthopedic Surgery; Future    Ambulatory referral to Spine & Pain Management; Future    Encounter for screening mammogram for malignant neoplasm of breast    Orders:    Mammo screening bilateral w 3d and cad; Future    Family history of elevated lipoprotein(a)    Orders:    Lipid panel; Future    TSH, 3rd generation with Free T4 reflex; Future    Other abnormal glucose    Orders:    Hemoglobin A1C; Future    Anaphylaxis, initial encounter    Orders:    EPINEPHrine (EpiPen 2-Cas) 0.3 mg/0.3 mL SOAJ; Inject 0.3 mL (0.3 mg total) into a muscle once for 1 dose    BMI 29.0-29.9,adult                History of Present Illness {?Quick Links Encounters * My Last Note * Last Note in Specialty * Snapshot * Since Last Visit * History :73901}  HPI  Review of Systems    Objective {?Quick Links Trend Vitals * Enter New Vitals * Results Review * Timeline (Adult) * Labs * Imaging * Cardiology * Procedures * Lung Cancer Screening * Surgical eConsent :75713}  BP 96/68 (BP Location: Left arm, Patient Position: Sitting, Cuff Size: Standard)   Pulse 78   Temp 98.2 °F (36.8 °C) (Tympanic)   Resp 18   Ht 5' 5.5\" (1.664 m)   Wt 80.1 kg (176 lb 9.6 oz)   SpO2 97%   BMI 28.94 kg/m²      Physical " Exam  {Administrative / Billing Section (Optional):02133}

## 2025-05-07 ENCOUNTER — TELEPHONE (OUTPATIENT)
Age: 50
End: 2025-05-07

## 2025-05-07 NOTE — TELEPHONE ENCOUNTER
Caller: Robina    Doctor: Dr. reynolds    Reason for call: Is asking for us to request patient's medical records from [facility]. Patient has her appointment on 5/16Please fax request  Thank you    Call back#: [phone]    FAX #  [phone]

## 2025-05-07 NOTE — TELEPHONE ENCOUNTER
Will fax [facility] request to upload patient's hip xrays & MRI images into portal. Patient has labrum tear. Besides this, this message was unspecific to the kinds of records that are being requested.

## 2025-05-14 ENCOUNTER — RA CDI HCC (OUTPATIENT)
Dept: OTHER | Facility: HOSPITAL | Age: 50
End: 2025-05-14

## 2025-05-16 ENCOUNTER — OFFICE VISIT (OUTPATIENT)
Dept: OBGYN CLINIC | Facility: MEDICAL CENTER | Age: 50
End: 2025-05-16
Payer: COMMERCIAL

## 2025-05-16 ENCOUNTER — APPOINTMENT (OUTPATIENT)
Dept: LAB | Facility: MEDICAL CENTER | Age: 50
End: 2025-05-16
Payer: COMMERCIAL

## 2025-05-16 VITALS — HEIGHT: 66 IN | BODY MASS INDEX: 28.77 KG/M2 | WEIGHT: 179 LBS

## 2025-05-16 DIAGNOSIS — Z83.430 FAMILY HISTORY OF ELEVATED LIPOPROTEIN(A): ICD-10-CM

## 2025-05-16 DIAGNOSIS — M16.11 PRIMARY OSTEOARTHRITIS OF ONE HIP, RIGHT: Primary | ICD-10-CM

## 2025-05-16 DIAGNOSIS — R73.09 OTHER ABNORMAL GLUCOSE: ICD-10-CM

## 2025-05-16 DIAGNOSIS — G90.50 COMPLEX REGIONAL PAIN SYNDROME TYPE 1, AFFECTING UNSPECIFIED SITE: ICD-10-CM

## 2025-05-16 LAB
ALBUMIN SERPL BCG-MCNC: 4.7 G/DL (ref 3.5–5)
ALP SERPL-CCNC: 88 U/L (ref 34–104)
ALT SERPL W P-5'-P-CCNC: 19 U/L (ref 7–52)
ANION GAP SERPL CALCULATED.3IONS-SCNC: 12 MMOL/L (ref 4–13)
AST SERPL W P-5'-P-CCNC: 17 U/L (ref 13–39)
BASOPHILS # BLD AUTO: 0.05 THOUSANDS/ÂΜL (ref 0–0.1)
BASOPHILS NFR BLD AUTO: 1 % (ref 0–1)
BILIRUB SERPL-MCNC: 0.38 MG/DL (ref 0.2–1)
BUN SERPL-MCNC: 13 MG/DL (ref 5–25)
CALCIUM SERPL-MCNC: 9.7 MG/DL (ref 8.4–10.2)
CHLORIDE SERPL-SCNC: 102 MMOL/L (ref 96–108)
CHOLEST SERPL-MCNC: 214 MG/DL (ref ?–200)
CO2 SERPL-SCNC: 27 MMOL/L (ref 21–32)
CREAT SERPL-MCNC: 0.89 MG/DL (ref 0.6–1.3)
EOSINOPHIL # BLD AUTO: 0.06 THOUSAND/ÂΜL (ref 0–0.61)
EOSINOPHIL NFR BLD AUTO: 1 % (ref 0–6)
ERYTHROCYTE [DISTWIDTH] IN BLOOD BY AUTOMATED COUNT: 12.8 % (ref 11.6–15.1)
EST. AVERAGE GLUCOSE BLD GHB EST-MCNC: 123 MG/DL
GFR SERPL CREATININE-BSD FRML MDRD: 75 ML/MIN/1.73SQ M
GLUCOSE P FAST SERPL-MCNC: 85 MG/DL (ref 65–99)
HBA1C MFR BLD: 5.9 %
HCT VFR BLD AUTO: 47.7 % (ref 34.8–46.1)
HDLC SERPL-MCNC: 83 MG/DL
HGB BLD-MCNC: 14.8 G/DL (ref 11.5–15.4)
IMM GRANULOCYTES # BLD AUTO: 0.01 THOUSAND/UL (ref 0–0.2)
IMM GRANULOCYTES NFR BLD AUTO: 0 % (ref 0–2)
LDLC SERPL CALC-MCNC: 98 MG/DL (ref 0–100)
LYMPHOCYTES # BLD AUTO: 2.03 THOUSANDS/ÂΜL (ref 0.6–4.47)
LYMPHOCYTES NFR BLD AUTO: 27 % (ref 14–44)
MCH RBC QN AUTO: 28.7 PG (ref 26.8–34.3)
MCHC RBC AUTO-ENTMCNC: 31 G/DL (ref 31.4–37.4)
MCV RBC AUTO: 93 FL (ref 82–98)
MONOCYTES # BLD AUTO: 0.55 THOUSAND/ÂΜL (ref 0.17–1.22)
MONOCYTES NFR BLD AUTO: 7 % (ref 4–12)
NEUTROPHILS # BLD AUTO: 4.87 THOUSANDS/ÂΜL (ref 1.85–7.62)
NEUTS SEG NFR BLD AUTO: 64 % (ref 43–75)
NONHDLC SERPL-MCNC: 131 MG/DL
NRBC BLD AUTO-RTO: 0 /100 WBCS
PLATELET # BLD AUTO: 312 THOUSANDS/UL (ref 149–390)
PMV BLD AUTO: 11.3 FL (ref 8.9–12.7)
POTASSIUM SERPL-SCNC: 4.3 MMOL/L (ref 3.5–5.3)
PROT SERPL-MCNC: 7.7 G/DL (ref 6.4–8.4)
RBC # BLD AUTO: 5.15 MILLION/UL (ref 3.81–5.12)
SODIUM SERPL-SCNC: 141 MMOL/L (ref 135–147)
TRIGL SERPL-MCNC: 166 MG/DL (ref ?–150)
TSH SERPL DL<=0.05 MIU/L-ACNC: 0.81 UIU/ML (ref 0.45–4.5)
WBC # BLD AUTO: 7.57 THOUSAND/UL (ref 4.31–10.16)

## 2025-05-16 PROCEDURE — 80053 COMPREHEN METABOLIC PANEL: CPT

## 2025-05-16 PROCEDURE — 84443 ASSAY THYROID STIM HORMONE: CPT

## 2025-05-16 PROCEDURE — 99204 OFFICE O/P NEW MOD 45 MIN: CPT | Performed by: ORTHOPAEDIC SURGERY

## 2025-05-16 PROCEDURE — 83036 HEMOGLOBIN GLYCOSYLATED A1C: CPT

## 2025-05-16 PROCEDURE — 36415 COLL VENOUS BLD VENIPUNCTURE: CPT

## 2025-05-16 PROCEDURE — 80061 LIPID PANEL: CPT

## 2025-05-16 PROCEDURE — 85025 COMPLETE CBC W/AUTO DIFF WBC: CPT

## 2025-05-16 NOTE — PROGRESS NOTES
Orthopaedic Surgery - Office Note  Chastity Shaw (50 y.o. female)   : 1975   MRN: [id number]  Encounter Date: 2025    Assessment & Plan  Primary osteoarthritis of one hip, right    Orders:    FL spine and pain procedure; Future        Assessment / Plan    Right hip OA   Patient understands that hip arthroscopy due to her arthritic changes. We discussed that her options are within the hip arthritis pathway. I do think it may be aggressive to do a WILLARD due to her early changes. I would recommend that she try to find an exercise routine (stretching and strengthening) that she can tolerate.   Referral given for a hip joint CSI; if she does not get relief from this 2nd injection then we need to consider an alternative pain source  Encouraged low impact activity   Activity as tolerated  Ice, heat and anti-inflammatories prn     Reviewed notes from [facility]  Reviewed images during the visit   Follow-up:  Return in about 1 month (around 2025) for after hip CSI with Dr. Baptiste.      Chief Complaint / Date of Onset  Right hip pain; chronic  Injury Mechanism / Date  2024 fell and landed on her lateral hip; pain since then   Surgery / Date  None    History of Present Illness   Chastity Shaw is a 50 y.o. female who presents for evaluation of right hip pain. She has been having pain since 2024 after she fell. She explains her pain as being constant and worsening with motion, sitting and standing. She describes her pain as being around her lateral hip and into her groin. She states she is experiencing a locking in her hip.  She was not able to tolerate PT. She did have an CSI into her hip and she did not have any pain relief from this. She does not currently have any lower back pain or any radiating symptoms.     Patient has a history of CRPS and lower back pain     Treatment Summary  Medications / Modalities  Gabapentin  Bracing / Immobilization  None  Physical Therapy  She was not able  "to tolerate  Injections  L hip CSI with no relief in symptoms   Prior Surgeries  None  Other Treatments  None    Employment / Current Status  N/A    Sport / Organization / Current Status  N/A      Review of Systems  Pertinent items are noted in HPI.  All other systems were reviewed and are negative.      Physical Exam  Ht 5' 5.5\" (1.664 m)   Wt 81.2 kg (179 lb)   BMI 29.33 kg/m²   Cons: Appears well.  No apparent distress.  Psych: Alert. Oriented x3.  Mood and affect normal.  Eyes: PERRLA, EOMI  Resp: Normal effort.  No audible wheezing or stridor.  CV: Palpable pulse.  No discernable arrhythmia.  No LE edema.  Lymph:  No palpable cervical, axillary, or inguinal lymphadenopathy.  Skin: Warm.  No palpable masses.  No visible lesions.  Neuro: Normal muscle tone.  Normal and symmetric DTR's.     Right Hip Exam  Alignment / Posture:  Normal resting hip posture.  Inspection:  No swelling. No ecchymosis.  Palpation:  no tenderness. No effusion.  ROM:  Hip Flexion 100. Hip ER 20. Hip IR 0.  Strength:  Hip Flexors 4-/5. Hip Abductors 5/5.  Stability:  (-) [place].  Tests:  (-) [facility].  Neurovascular:  Sensation intact in DP/SP/Kim/Sa/T nerve distributions. 2+ DP & PT pulses.  Gait:  Antalgic.       Studies Reviewed  I have personally reviewed pertinent films in PACS.  XR of right hip- images from 02/19/25 showing mild arthritic changes, CAM deformity, LCEA 30  MRI of right hip- images from 02/19/25 showing narrowing of joint space, labral tear and para labral cyst     Procedures  No procedures today.    Medical, Surgical, Family, and Social History  The patient's medical history, family history, and social history, were reviewed and updated as appropriate.    Past Medical History:   Diagnosis Date    Anxiety     Asthma     Back pain     Chronic narcotic dependence (HCC)     CRPS (complex regional pain syndrome type I)     CRPS (complex regional pain syndrome type I)     RLE and RUE    Current mild episode of major " depressive disorder without prior episode ([facility]) 10/15/2018    Depression     Fracture     2015 right knee fracture    Latex allergy     Limb alert care status     RUE, RLE    Mobility impaired     uses cane    Optic neuritis, left     PONV (postoperative nausea and vomiting)     RSD (reflex sympathetic dystrophy)     CRPS RUE/RLE    Wears glasses        Past Surgical History:   Procedure Laterality Date    BREAST LUMPECTOMY      Left    IMPLANTATION / PLACEMENT EPIDURAL NEUROSTIMULATOR ELECTRODES Right     KNEE ARTHROSCOPY Right     NM INSJ/RPLCMT SPINAL NPG/RCVR POCKET CRTJ&CONNJ Right 7/18/2017    Procedure: PLACEMENT OF NEW SPINAL COLUMN STIMULATOR IN THE RIGHT BUTTOCK THROUGH SEPERATE INCISION WITH TUNNELING OF LEAD; POSSIBLE PLACEMENT OF EXTENSION;  Surgeon: Ángel Mercado MD;  Location: [facility];  Service: Neurosurgery    NM INSJ/RPLCMT SPINAL NPG/RCVR POCKET CRTJ&CONNJ N/A 4/25/2018    Procedure: Removal of left flank spinal cord stimulator generator, placement of new spinal cord stimulator generator in left buttock through separate incision;  Surgeon: Ángel Mercado MD;  Location: [facility];  Service: Neurosurgery    NM GLORIA IMPLTJ NSTIM ELTRDS PLATE/PADDLE EDRL N/A 7/14/2016    Procedure: LAMINECTOMY  T10 -11 WITH  PLACEMENT OF  SPINAL CORD STIMULATOR AND PULSE GEBNERATOR  IMPULSE MONITORING ;  Surgeon: Nicole Wylie MD;  Location: [facility];  Service: Orthopedics    NM REVJ/RMVL IMPL SPI NPG/RCVR DTCH CONNJ ELTRD RA Right 7/18/2017    Procedure: REMOVAL RIGHT FLANK SPINAL COLUMN STIMULATOR IPG;  Surgeon: Ángel Mercado MD;  Location: [facility];  Service: Neurosurgery    NM REVJ/RMVL IMPL SPI NPG/RCVR DTC CONNJ ELTRD RA Bilateral 3/1/2019    Procedure: REMOVAL OF IMPLANTABLE PULSE GENERATOR (IPG) DORSAL SPINAL COLUMN STIMULATOR  (DCS) AND GENERATOR OF CERVICAL AND THORACIC (NEUROMONITORING);  Surgeon: Maxi Hector MD;  Location: [facility];  Service: Neurosurgery    TUBAL LIGATION Bilateral 2003  "   WISDOM TOOTH EXTRACTION         Family History   Adopted: Yes       Social History     Occupational History    Not on file   Tobacco Use    Smoking status: Former     Current packs/day: 0.00     Average packs/day: 0.5 packs/day for 5.0 years (2.5 ttl pk-yrs)     Types: Cigarettes     Start date: 2005     Quit date: 2010     Years since quittin.9    Smokeless tobacco: Former   Vaping Use    Vaping status: Never Used   Substance and Sexual Activity    Alcohol use: Yes     Comment: Holidays    Drug use: No    Sexual activity: Not on file       Allergies[1]    Current Medications[2]      Vika Kaba    Scribe Attestation      I,:  Vika Kaba am acting as a scribe while in the presence of the attending physician.:       I,:  Fish Baptiste MD personally performed the services described in this documentation    as scribed in my presence.:                [1]   Allergies  Allergen Reactions    Bee Venom Anaphylaxis    Codeine Anaphylaxis     Reaction Date: 2011;   Possible other narcotics; pt may take Narco    Hydrocodone-Guaifenesin Anaphylaxis    Iodinated Contrast Media Anaphylaxis    Iodine - Food Allergy Anaphylaxis    Latex Anaphylaxis    Other Anaphylaxis     Narcotics.the patient states, \"I can only take Dilaudid and Toradol\"    Oxycodone Hcl Anaphylaxis    Penicillins Anaphylaxis     Anaphylaxis    Percocet [Oxycodone-Acetaminophen] Anaphylaxis    Robaxin [Methocarbamol] Other (See Comments)     Stroke symptoms    Shellfish-Derived Products - Food Allergy Anaphylaxis    Clindamycin Rash   [2]   Current Outpatient Medications:     albuterol (PROVENTIL HFA,VENTOLIN HFA) 90 mcg/act inhaler, Inhale 2 puffs as needed for wheezing, Disp: 1 each, Rfl: 0    aspirin (ECOTRIN LOW STRENGTH) 81 mg EC tablet, Take 81 mg by mouth in the morning., Disp: , Rfl:     Fluticasone-Salmeterol (Advair) 250-50 mcg/dose inhaler, , Disp: , Rfl:     gabapentin (NEURONTIN) 600 MG tablet, 2 tablets in the " morning and 2 tablets in the evening and 2 tablets before bedtime., Disp: , Rfl:     LORazepam (ATIVAN) 0.5 mg tablet, Take 0.5 mg by mouth every 6 (six) hours as needed for anxiety, Disp: , Rfl:     baclofen 10 mg tablet, Take 10 mg by mouth 4 (four) times a day   (Patient not taking: Reported on 4/22/2025), Disp: , Rfl:     benzonatate (TESSALON PERLES) 100 mg capsule, Take 1 capsule (100 mg total) by mouth every 8 (eight) hours (Patient not taking: Reported on 4/22/2025), Disp: 21 capsule, Rfl: 0    ciprofloxacin-dexamethasone (CIPRODEX) otic suspension, Administer 4 drops to the right ear 2 (two) times a day for 7 days, Disp: 7.5 mL, Rfl: 0    DULoxetine (CYMBALTA) 30 mg delayed release capsule, Take 30 mg by mouth 2 (two) times a day  (Patient not taking: Reported on 4/22/2025), Disp: , Rfl:     EPINEPHrine (EpiPen 2-Cas) 0.3 mg/0.3 mL SOAJ, Inject 0.3 mL (0.3 mg total) into a muscle once for 1 dose, Disp: 0.3 mL, Rfl: 2    ergocalciferol (VITAMIN D2) 50,000 units, Take 1 capsule (50,000 Units total) by mouth once a week (Patient not taking: Reported on 4/22/2025), Disp: 4 capsule, Rfl: 2    fluticasone (FLONASE) 50 mcg/act nasal spray, 1 spray into each nostril daily (Patient not taking: Reported on 4/22/2025), Disp: 16 g, Rfl: 0    guaiFENesin (MUCINEX) 600 mg 12 hr tablet, Take 2 tablets (1,200 mg total) by mouth every 12 (twelve) hours (Patient not taking: Reported on 4/22/2025), Disp: 30 tablet, Rfl: 0

## 2025-05-19 ENCOUNTER — RESULTS FOLLOW-UP (OUTPATIENT)
Dept: FAMILY MEDICINE CLINIC | Facility: HOME HEALTHCARE | Age: 50
End: 2025-05-19

## 2025-05-20 ENCOUNTER — OFFICE VISIT (OUTPATIENT)
Dept: FAMILY MEDICINE CLINIC | Facility: HOME HEALTHCARE | Age: 50
End: 2025-05-20
Payer: COMMERCIAL

## 2025-05-20 ENCOUNTER — CLINICAL SUPPORT (OUTPATIENT)
Dept: FAMILY MEDICINE CLINIC | Facility: HOME HEALTHCARE | Age: 50
End: 2025-05-20

## 2025-05-20 VITALS
RESPIRATION RATE: 18 BRPM | DIASTOLIC BLOOD PRESSURE: 78 MMHG | SYSTOLIC BLOOD PRESSURE: 104 MMHG | HEART RATE: 86 BPM | OXYGEN SATURATION: 97 % | BODY MASS INDEX: 29.76 KG/M2 | WEIGHT: 181.6 LBS | TEMPERATURE: 98.7 F

## 2025-05-20 DIAGNOSIS — Z00.00 MEDICARE ANNUAL WELLNESS VISIT, SUBSEQUENT: Primary | ICD-10-CM

## 2025-05-20 DIAGNOSIS — L42 PITYRIASIS ROSEA: Primary | ICD-10-CM

## 2025-05-20 DIAGNOSIS — R07.9 CHEST PAIN, UNSPECIFIED TYPE: ICD-10-CM

## 2025-05-20 DIAGNOSIS — K21.00 GASTROESOPHAGEAL REFLUX DISEASE WITH ESOPHAGITIS WITHOUT HEMORRHAGE: ICD-10-CM

## 2025-05-20 DIAGNOSIS — N89.8 VAGINAL DRYNESS: ICD-10-CM

## 2025-05-20 PROBLEM — Z86.79 HX OF MITRAL VALVE PROLAPSE: Status: ACTIVE | Noted: 2025-05-20

## 2025-05-20 LAB — FSH SERPL-ACNC: 39.8 MIU/ML

## 2025-05-20 PROCEDURE — 83001 ASSAY OF GONADOTROPIN (FSH): CPT

## 2025-05-20 PROCEDURE — G0439 PPPS, SUBSEQ VISIT: HCPCS

## 2025-05-20 PROCEDURE — 82672 ASSAY OF ESTROGEN: CPT

## 2025-05-20 RX ORDER — ESTRADIOL 0.1 MG/G
2 CREAM VAGINAL WEEKLY
Qty: 42.5 G | Refills: 0 | Status: SHIPPED | OUTPATIENT
Start: 2025-05-20

## 2025-05-20 RX ORDER — FAMOTIDINE 20 MG/1
20 TABLET, FILM COATED ORAL 2 TIMES DAILY
Qty: 60 TABLET | Refills: 0 | Status: SHIPPED | OUTPATIENT
Start: 2025-05-20 | End: 2025-06-19

## 2025-05-20 NOTE — ASSESSMENT & PLAN NOTE
Hx of Mitral Valve Prolapse in childhood  3 year hx of intermittent, non-reproducible, non-exertional mid-epigastric chest pain that also radiate to lower L chest. Lasts few seconds to a minute for each episode  Prior test done in hospital in 2023 with no significant findings, troponin negative.   No cardiac workup stress test or cardiac procedure done.  Patient was given nitro to take daily initially then imdur as needed now.   Echo done in 2023: normal EF 65%, no wall abnormalities. Mitral valve appear normal with mild regurgitation  Attempted EKG in office today but machine malfunction  Possible differentials include acid reflux, less likely angina or msk related issues    - referral to EKG provided  - trial pepcid 20mg BID   - recommend patient to stop imdur or nitro for now  - f/u estrogen and FSH lab in 4 weeks  Orders:    famotidine (PEPCID) 20 mg tablet; Take 1 tablet (20 mg total) by mouth 2 (two) times a day    ECG 12 lead; Future    Estrogens, total; Future    Follicle stimulating hormone; Future

## 2025-05-20 NOTE — ASSESSMENT & PLAN NOTE
Hx of acid reflux, not on any meds  Reported three year hx of intermittent chest pain as well. Negative cardiac workup in the past, was prescribed nitro and imdur to take.    - stop nitro or imdur, trial pepcid 20mg BID  - f/u in 4 weeks  Orders:    famotidine (PEPCID) 20 mg tablet; Take 1 tablet (20 mg total) by mouth 2 (two) times a day

## 2025-05-20 NOTE — ASSESSMENT & PLAN NOTE
BMI Counseling: Body mass index is 29.76 kg/m². The BMI is above normal. Nutrition recommendations include decreasing soda and/or juice intake. Exercise recommendations include exercising 3-5 times per week.

## 2025-05-20 NOTE — PATIENT INSTRUCTIONS
Medicare Preventive Visit Patient Instructions  Thank you for completing your Welcome to Medicare Visit or Medicare Annual Wellness Visit today. Your next wellness visit will be due in one year (5/21/2026).  The screening/preventive services that you may require over the next 5-10 years are detailed below. Some tests may not apply to you based off risk factors and/or age. Screening tests ordered at today's visit but not completed yet may show as past due. Also, please note that scanned in results may not display below.  Preventive Screenings:  Service Recommendations Previous Testing/Comments   Colorectal Cancer Screening  * Colonoscopy    * Fecal Occult Blood Test (FOBT)/Fecal Immunochemical Test (FIT)  * Fecal DNA/Cologuard Test  * Flexible Sigmoidoscopy Age: 45-75 years old   Colonoscopy: every 10 years (may be performed more frequently if at higher risk)  OR  FOBT/FIT: every 1 year  OR  Cologuard: every 3 years  OR  Sigmoidoscopy: every 5 years  Screening may be recommended earlier than age 45 if at higher risk for colorectal cancer. Also, an individualized decision between you and your healthcare provider will decide whether screening between the ages of 76-85 would be appropriate. Colonoscopy: Not on file  FOBT/FIT: Not on file  Cologuard: Not on file  Sigmoidoscopy: Not on file          Breast Cancer Screening Age: 40+ years old  Frequency: every 1-2 years  Not required if history of left and right mastectomy Mammogram: 12/01/2022        Cervical Cancer Screening Between the ages of 21-29, pap smear recommended once every 3 years.   Between the ages of 30-65, can perform pap smear with HPV co-testing every 5 years.   Recommendations may differ for women with a history of total hysterectomy, cervical cancer, or abnormal pap smears in past. Pap Smear: 11/29/2017        Hepatitis C Screening Once for adults born between 1945 and 1965  More frequently in patients at high risk for Hepatitis C Hep C Antibody:  06/22/2016    Screening Current   Diabetes Screening 1-2 times per year if you're at risk for diabetes or have pre-diabetes Fasting glucose: 85 mg/dL (5/16/2025)  A1C: 5.9 % (5/16/2025)  Screening Current   Cholesterol Screening Once every 5 years if you don't have a lipid disorder. May order more often based on risk factors. Lipid panel: 05/16/2025    Screening Current     Other Preventive Screenings Covered by Medicare:  Abdominal Aortic Aneurysm (AAA) Screening: covered once if your at risk. You're considered to be at risk if you have a family history of AAA.  Lung Cancer Screening: covers low dose CT scan once per year if you meet all of the following conditions: (1) Age 55-77; (2) No signs or symptoms of lung cancer; (3) Current smoker or have quit smoking within the last 15 years; (4) You have a tobacco smoking history of at least 20 pack years (packs per day multiplied by number of years you smoked); (5) You get a written order from a healthcare provider.  Glaucoma Screening: covered annually if you're considered high risk: (1) You have diabetes OR (2) Family history of glaucoma OR (3)  aged 50 and older OR (4)  American aged 65 and older  Osteoporosis Screening: covered every 2 years if you meet one of the following conditions: (1) You're estrogen deficient and at risk for osteoporosis based off medical history and other findings; (2) Have a vertebral abnormality; (3) On glucocorticoid therapy for more than 3 months; (4) Have primary hyperparathyroidism; (5) On osteoporosis medications and need to assess response to drug therapy.   Last bone density test (DXA Scan): Not on file.  HIV Screening: covered annually if you're between the age of 15-65. Also covered annually if you are younger than 15 and older than 65 with risk factors for HIV infection. For pregnant patients, it is covered up to 3 times per pregnancy.    Immunizations:  Immunization Recommendations   Influenza Vaccine  Annual influenza vaccination during flu season is recommended for all persons aged >= 6 months who do not have contraindications   Pneumococcal Vaccine   * Pneumococcal conjugate vaccine = PCV13 (Prevnar 13), PCV15 (Vaxneuvance), PCV20 (Prevnar 20)  * Pneumococcal polysaccharide vaccine = PPSV23 (Pneumovax) Adults 19-63 yo with certain risk factors or if 65+ yo  If never received any pneumonia vaccine: recommend Prevnar 20 (PCV20)  Give PCV20 if previously received 1 dose of PCV13 or PPSV23   Hepatitis B Vaccine 3 dose series if at intermediate or high risk (ex: diabetes, end stage renal disease, liver disease)   Respiratory syncytial virus (RSV) Vaccine - COVERED BY MEDICARE PART D  * RSVPreF3 (Arexvy) CDC recommends that adults 60 years of age and older may receive a single dose of RSV vaccine using shared clinical decision-making (SCDM)   Tetanus (Td) Vaccine - COST NOT COVERED BY MEDICARE PART B Following completion of primary series, a booster dose should be given every 10 years to maintain immunity against tetanus. Td may also be given as tetanus wound prophylaxis.   Tdap Vaccine - COST NOT COVERED BY MEDICARE PART B Recommended at least once for all adults. For pregnant patients, recommended with each pregnancy.   Shingles Vaccine (Shingrix) - COST NOT COVERED BY MEDICARE PART B  2 shot series recommended in those 19 years and older who have or will have weakened immune systems or those 50 years and older     Health Maintenance Due:      Topic Date Due   • HIV Screening  Never done   • Cervical Cancer Screening  11/29/2019   • Colorectal Cancer Screening  Never done   • Breast Cancer Screening: Mammogram  12/01/2023   • Hepatitis C Screening  Completed     Immunizations Due:      Topic Date Due   • COVID-19 Vaccine (3 - 2024-25 season) 09/01/2024     Advance Directives   What are advance directives?  Advance directives are legal documents that state your wishes and plans for medical care. These plans are made  ahead of time in case you lose your ability to make decisions for yourself. Advance directives can apply to any medical decision, such as the treatments you want, and if you want to donate organs.   What are the types of advance directives?  There are many types of advance directives, and each state has rules about how to use them. You may choose a combination of any of the following:  Living will:  This is a written record of the treatment you want. You can also choose which treatments you do not want, which to limit, and which to stop at a certain time. This includes surgery, medicine, IV fluid, and tube feedings.   Durable power of  for healthcare (DPAHC):  This is a written record that states who you want to make healthcare choices for you when you are unable to make them for yourself. This person, called a proxy, is usually a family member or a friend. You may choose more than 1 proxy.  Do not resuscitate (DNR) order:  A DNR order is used in case your heart stops beating or you stop breathing. It is a request not to have certain forms of treatment, such as CPR. A DNR order may be included in other types of advance directives.  Medical directive:  This covers the care that you want if you are in a coma, near death, or unable to make decisions for yourself. You can list the treatments you want for each condition. Treatment may include pain medicine, surgery, blood transfusions, dialysis, IV or tube feedings, and a ventilator (breathing machine).  Values history:  This document has questions about your views, beliefs, and how you feel and think about life. This information can help others choose the care that you would choose.  Why are advance directives important?  An advance directive helps you control your care. Although spoken wishes may be used, it is better to have your wishes written down. Spoken wishes can be misunderstood, or not followed. Treatments may be given even if you do not want them. An  advance directive may make it easier for your family to make difficult choices about your care.   Urinary Incontinence   Urinary incontinence (UI)  is when you lose control of your bladder. UI develops because your bladder cannot store or empty urine properly. The 3 most common types of UI are stress incontinence, urge incontinence, or both.  Medicines:   May be given to help strengthen your bladder control. Report any side effects of medication to your healthcare provider.  Do pelvic muscle exercises often:  Your pelvic muscles help you stop urinating. Squeeze these muscles tight for 5 seconds, then relax for 5 seconds. Gradually work up to squeezing for 10 seconds. Do 3 sets of 15 repetitions a day, or as directed. This will help strengthen your pelvic muscles and improve bladder control.  Train your bladder:  Go to the bathroom at set times, such as every 2 hours, even if you do not feel the urge to go. You can also try to hold your urine when you feel the urge to go. For example, hold your urine for 5 minutes when you feel the urge to go. As that becomes easier, hold your urine for 10 minutes.   Self-care:   Keep a UI record.  Write down how often you leak urine and how much you leak. Make a note of what you were doing when you leaked urine.  Drink liquids as directed. You may need to limit the amount of liquid you drink to help control your urine leakage. Do not drink any liquid right before you go to bed. Limit or do not have drinks that contain caffeine or alcohol.   Prevent constipation.  Eat a variety of high-fiber foods. Good examples are high-fiber cereals, beans, vegetables, and whole-grain breads. Walking is the best way to trigger your intestines to have a bowel movement.  Exercise regularly and maintain a healthy weight.  Weight loss and exercise will decrease pressure on your bladder and help you control your leakage.   Use a catheter as directed  to help empty your bladder. A catheter is a tiny,  plastic tube that is put into your bladder to drain your urine.   Go to behavior therapy as directed.  Behavior therapy may be used to help you learn to control your urge to urinate.    Weight Management   Why it is important to manage your weight:  Being overweight increases your risk of health conditions such as heart disease, high blood pressure, type 2 diabetes, and certain types of cancer. It can also increase your risk for osteoarthritis, sleep apnea, and other respiratory problems. Aim for a slow, steady weight loss. Even a small amount of weight loss can lower your risk of health problems.  How to lose weight safely:  A safe and healthy way to lose weight is to eat fewer calories and get regular exercise. You can lose up about 1 pound a week by decreasing the number of calories you eat by 500 calories each day.   Healthy meal plan for weight management:  A healthy meal plan includes a variety of foods, contains fewer calories, and helps you stay healthy. A healthy meal plan includes the following:  Eat whole-grain foods more often.  A healthy meal plan should contain fiber. Fiber is the part of grains, fruits, and vegetables that is not broken down by your body. Whole-grain foods are healthy and provide extra fiber in your diet. Some examples of whole-grain foods are whole-wheat breads and pastas, oatmeal, brown rice, and bulgur.  Eat a variety of vegetables every day.  Include dark, leafy greens such as spinach, kale, laureen greens, and mustard greens. Eat yellow and orange vegetables such as carrots, sweet potatoes, and winter squash.   Eat a variety of fruits every day.  Choose fresh or canned fruit (canned in its own juice or light syrup) instead of juice. Fruit juice has very little or no fiber.  Eat low-fat dairy foods.  Drink fat-free (skim) milk or 1% milk. Eat fat-free yogurt and low-fat cottage cheese. Try low-fat cheeses such as mozzarella and other reduced-fat cheeses.  Choose meat and other  protein foods that are low in fat.  Choose beans or other legumes such as split peas or lentils. Choose fish, skinless poultry (chicken or turkey), or lean cuts of red meat (beef or pork). Before you cook meat or poultry, cut off any visible fat.   Use less fat and oil.  Try baking foods instead of frying them. Add less fat, such as margarine, sour cream, regular salad dressing and mayonnaise to foods. Eat fewer high-fat foods. Some examples of high-fat foods include french fries, doughnuts, ice cream, and cakes.  Eat fewer sweets.  Limit foods and drinks that are high in sugar. This includes candy, cookies, regular soda, and sweetened drinks.  Exercise:  Exercise at least 30 minutes per day on most days of the week. Some examples of exercise include walking, biking, dancing, and swimming. You can also fit in more physical activity by taking the stairs instead of the elevator or parking farther away from stores. Ask your healthcare provider about the best exercise plan for you.    © Copyright [employer] 2018 Information is for End User's use only and may not be sold, redistributed or otherwise used for commercial purposes. All illustrations and images included in CareNotes® are the copyrighted property of [employer] or [employer]      Medicare Preventive Visit Patient Instructions  Thank you for completing your Welcome to Medicare Visit or Medicare Annual Wellness Visit today. Your next wellness visit will be due in one year (5/21/2026).  The screening/preventive services that you may require over the next 5-10 years are detailed below. Some tests may not apply to you based off risk factors and/or age. Screening tests ordered at today's visit but not completed yet may show as past due. Also, please note that scanned in results may not display below.  Preventive Screenings:  Service Recommendations Previous Testing/Comments   Colorectal Cancer Screening  * Colonoscopy    * Fecal Occult Blood Test  (FOBT)/Fecal Immunochemical Test (FIT)  * Fecal DNA/Cologuard Test  * Flexible Sigmoidoscopy Age: 45-75 years old   Colonoscopy: every 10 years (may be performed more frequently if at higher risk)  OR  FOBT/FIT: every 1 year  OR  Cologuard: every 3 years  OR  Sigmoidoscopy: every 5 years  Screening may be recommended earlier than age 45 if at higher risk for colorectal cancer. Also, an individualized decision between you and your healthcare provider will decide whether screening between the ages of 76-85 would be appropriate. Colonoscopy: Not on file  FOBT/FIT: Not on file  Cologuard: Not on file  Sigmoidoscopy: Not on file          Breast Cancer Screening Age: 40+ years old  Frequency: every 1-2 years  Not required if history of left and right mastectomy Mammogram: 12/01/2022        Cervical Cancer Screening Between the ages of 21-29, pap smear recommended once every 3 years.   Between the ages of 30-65, can perform pap smear with HPV co-testing every 5 years.   Recommendations may differ for women with a history of total hysterectomy, cervical cancer, or abnormal pap smears in past. Pap Smear: 11/29/2017        Hepatitis C Screening Once for adults born between 1945 and 1965  More frequently in patients at high risk for Hepatitis C Hep C Antibody: 06/22/2016    Screening Current   Diabetes Screening 1-2 times per year if you're at risk for diabetes or have pre-diabetes Fasting glucose: 85 mg/dL (5/16/2025)  A1C: 5.9 % (5/16/2025)  Screening Current   Cholesterol Screening Once every 5 years if you don't have a lipid disorder. May order more often based on risk factors. Lipid panel: 05/16/2025    Screening Current     Other Preventive Screenings Covered by Medicare:  Abdominal Aortic Aneurysm (AAA) Screening: covered once if your at risk. You're considered to be at risk if you have a family history of AAA.  Lung Cancer Screening: covers low dose CT scan once per year if you meet all of the following conditions: (1)  Age 55-77; (2) No signs or symptoms of lung cancer; (3) Current smoker or have quit smoking within the last 15 years; (4) You have a tobacco smoking history of at least 20 pack years (packs per day multiplied by number of years you smoked); (5) You get a written order from a healthcare provider.  Glaucoma Screening: covered annually if you're considered high risk: (1) You have diabetes OR (2) Family history of glaucoma OR (3)  aged 50 and older OR (4)  American aged 65 and older  Osteoporosis Screening: covered every 2 years if you meet one of the following conditions: (1) You're estrogen deficient and at risk for osteoporosis based off medical history and other findings; (2) Have a vertebral abnormality; (3) On glucocorticoid therapy for more than 3 months; (4) Have primary hyperparathyroidism; (5) On osteoporosis medications and need to assess response to drug therapy.   Last bone density test (DXA Scan): Not on file.  HIV Screening: covered annually if you're between the age of 15-65. Also covered annually if you are younger than 15 and older than 65 with risk factors for HIV infection. For pregnant patients, it is covered up to 3 times per pregnancy.    Immunizations:  Immunization Recommendations   Influenza Vaccine Annual influenza vaccination during flu season is recommended for all persons aged >= 6 months who do not have contraindications   Pneumococcal Vaccine   * Pneumococcal conjugate vaccine = PCV13 (Prevnar 13), PCV15 (Vaxneuvance), PCV20 (Prevnar 20)  * Pneumococcal polysaccharide vaccine = PPSV23 (Pneumovax) Adults 19-65 yo with certain risk factors or if 65+ yo  If never received any pneumonia vaccine: recommend Prevnar 20 (PCV20)  Give PCV20 if previously received 1 dose of PCV13 or PPSV23   Hepatitis B Vaccine 3 dose series if at intermediate or high risk (ex: diabetes, end stage renal disease, liver disease)   Respiratory syncytial virus (RSV) Vaccine - COVERED BY MEDICARE  PART D  * RSVPreF3 (Arexvy) CDC recommends that adults 60 years of age and older may receive a single dose of RSV vaccine using shared clinical decision-making (SCDM)   Tetanus (Td) Vaccine - COST NOT COVERED BY MEDICARE PART B Following completion of primary series, a booster dose should be given every 10 years to maintain immunity against tetanus. Td may also be given as tetanus wound prophylaxis.   Tdap Vaccine - COST NOT COVERED BY MEDICARE PART B Recommended at least once for all adults. For pregnant patients, recommended with each pregnancy.   Shingles Vaccine (Shingrix) - COST NOT COVERED BY MEDICARE PART B  2 shot series recommended in those 19 years and older who have or will have weakened immune systems or those 50 years and older     Health Maintenance Due:      Topic Date Due   • HIV Screening  Never done   • Cervical Cancer Screening  11/29/2019   • Colorectal Cancer Screening  Never done   • Breast Cancer Screening: Mammogram  12/01/2023   • Hepatitis C Screening  Completed     Immunizations Due:      Topic Date Due   • COVID-19 Vaccine (3 - 2024-25 season) 09/01/2024     Advance Directives   What are advance directives?  Advance directives are legal documents that state your wishes and plans for medical care. These plans are made ahead of time in case you lose your ability to make decisions for yourself. Advance directives can apply to any medical decision, such as the treatments you want, and if you want to donate organs.   What are the types of advance directives?  There are many types of advance directives, and each state has rules about how to use them. You may choose a combination of any of the following:  Living will:  This is a written record of the treatment you want. You can also choose which treatments you do not want, which to limit, and which to stop at a certain time. This includes surgery, medicine, IV fluid, and tube feedings.   Durable power of  for healthcare (DPAHC):  This is  a written record that states who you want to make healthcare choices for you when you are unable to make them for yourself. This person, called a proxy, is usually a family member or a friend. You may choose more than 1 proxy.  Do not resuscitate (DNR) order:  A DNR order is used in case your heart stops beating or you stop breathing. It is a request not to have certain forms of treatment, such as CPR. A DNR order may be included in other types of advance directives.  Medical directive:  This covers the care that you want if you are in a coma, near death, or unable to make decisions for yourself. You can list the treatments you want for each condition. Treatment may include pain medicine, surgery, blood transfusions, dialysis, IV or tube feedings, and a ventilator (breathing machine).  Values history:  This document has questions about your views, beliefs, and how you feel and think about life. This information can help others choose the care that you would choose.  Why are advance directives important?  An advance directive helps you control your care. Although spoken wishes may be used, it is better to have your wishes written down. Spoken wishes can be misunderstood, or not followed. Treatments may be given even if you do not want them. An advance directive may make it easier for your family to make difficult choices about your care.   Urinary Incontinence   Urinary incontinence (UI)  is when you lose control of your bladder. UI develops because your bladder cannot store or empty urine properly. The 3 most common types of UI are stress incontinence, urge incontinence, or both.  Medicines:   May be given to help strengthen your bladder control. Report any side effects of medication to your healthcare provider.  Do pelvic muscle exercises often:  Your pelvic muscles help you stop urinating. Squeeze these muscles tight for 5 seconds, then relax for 5 seconds. Gradually work up to squeezing for 10 seconds. Do 3 sets  of 15 repetitions a day, or as directed. This will help strengthen your pelvic muscles and improve bladder control.  Train your bladder:  Go to the bathroom at set times, such as every 2 hours, even if you do not feel the urge to go. You can also try to hold your urine when you feel the urge to go. For example, hold your urine for 5 minutes when you feel the urge to go. As that becomes easier, hold your urine for 10 minutes.   Self-care:   Keep a UI record.  Write down how often you leak urine and how much you leak. Make a note of what you were doing when you leaked urine.  Drink liquids as directed. You may need to limit the amount of liquid you drink to help control your urine leakage. Do not drink any liquid right before you go to bed. Limit or do not have drinks that contain caffeine or alcohol.   Prevent constipation.  Eat a variety of high-fiber foods. Good examples are high-fiber cereals, beans, vegetables, and whole-grain breads. Walking is the best way to trigger your intestines to have a bowel movement.  Exercise regularly and maintain a healthy weight.  Weight loss and exercise will decrease pressure on your bladder and help you control your leakage.   Use a catheter as directed  to help empty your bladder. A catheter is a tiny, plastic tube that is put into your bladder to drain your urine.   Go to behavior therapy as directed.  Behavior therapy may be used to help you learn to control your urge to urinate.    Weight Management   Why it is important to manage your weight:  Being overweight increases your risk of health conditions such as heart disease, high blood pressure, type 2 diabetes, and certain types of cancer. It can also increase your risk for osteoarthritis, sleep apnea, and other respiratory problems. Aim for a slow, steady weight loss. Even a small amount of weight loss can lower your risk of health problems.  How to lose weight safely:  A safe and healthy way to lose weight is to eat fewer  calories and get regular exercise. You can lose up about 1 pound a week by decreasing the number of calories you eat by 500 calories each day.   Healthy meal plan for weight management:  A healthy meal plan includes a variety of foods, contains fewer calories, and helps you stay healthy. A healthy meal plan includes the following:  Eat whole-grain foods more often.  A healthy meal plan should contain fiber. Fiber is the part of grains, fruits, and vegetables that is not broken down by your body. Whole-grain foods are healthy and provide extra fiber in your diet. Some examples of whole-grain foods are whole-wheat breads and pastas, oatmeal, brown rice, and bulgur.  Eat a variety of vegetables every day.  Include dark, leafy greens such as spinach, kale, laureen greens, and mustard greens. Eat yellow and orange vegetables such as carrots, sweet potatoes, and winter squash.   Eat a variety of fruits every day.  Choose fresh or canned fruit (canned in its own juice or light syrup) instead of juice. Fruit juice has very little or no fiber.  Eat low-fat dairy foods.  Drink fat-free (skim) milk or 1% milk. Eat fat-free yogurt and low-fat cottage cheese. Try low-fat cheeses such as mozzarella and other reduced-fat cheeses.  Choose meat and other protein foods that are low in fat.  Choose beans or other legumes such as split peas or lentils. Choose fish, skinless poultry (chicken or turkey), or lean cuts of red meat (beef or pork). Before you cook meat or poultry, cut off any visible fat.   Use less fat and oil.  Try baking foods instead of frying them. Add less fat, such as margarine, sour cream, regular salad dressing and mayonnaise to foods. Eat fewer high-fat foods. Some examples of high-fat foods include french fries, doughnuts, ice cream, and cakes.  Eat fewer sweets.  Limit foods and drinks that are high in sugar. This includes candy, cookies, regular soda, and sweetened drinks.  Exercise:  Exercise at least 30  minutes per day on most days of the week. Some examples of exercise include walking, biking, dancing, and swimming. You can also fit in more physical activity by taking the stairs instead of the elevator or parking farther away from stores. Ask your healthcare provider about the best exercise plan for you.    © Copyright [employer] 2018 Information is for End User's use only and may not be sold, redistributed or otherwise used for commercial purposes. All illustrations and images included in CareNotes® are the copyrighted property of [employer].D.A[employer]M., Inc. or [employer]

## 2025-05-20 NOTE — PROGRESS NOTES
Name: Chastity Shaw      : 1975      MRN: [id number]  Encounter Provider: Danika Moralez DO  Encounter Date: 2025   Encounter department: [facility]  :  Assessment & Plan  BMI 29.0-29.9,adult         Medicare annual wellness visit, subsequent              Preventive health issues were discussed with patient, and age appropriate screening tests were ordered as noted in patient's After Visit Summary. Personalized health advice and appropriate referrals for health education or preventive services given if needed, as noted in patient's After Visit Summary.    History of Present Illness   {?Quick Links Encounters * My Last Note * Last Note in Specialty * Snapshot * Since Last Visit * History :61518}  HPI   Patient Care Team:  Danika Moralez DO as PCP - General (Family Medicine)  Perez Darby MD as PCP - PCP-[facility] ([facility])  MD Ángel Chatman MD    Review of Systems  Medical History Reviewed by provider this encounter:       Annual Wellness Visit Questionnaire   Annual Wellness Visit  Social Drivers of Health     Financial Resource Strain: Not At Risk (2025)    Received from [facility]    Financial Insecurity    • In the last 12 months did you skip medications to save money?: No    • In the last 12 months was there a time when you needed to see a doctor but could not because of cost?: No   Food Insecurity: No Food Insecurity (2025)    Nursing - Inadequate Food Risk Classification    • Worried About Running Out of Food in the Last Year: Never true    • Ran Out of Food in the Last Year: Never true   Recent Concern: Food Insecurity - Food Insecurity Present (5/15/2025)    Nursing - Inadequate Food Risk Classification    • Worried About Running Out of Food in the Last Year: Sometimes true    • Ran Out of Food in the Last Year: Sometimes true   Transportation Needs: Unmet Transportation Needs (2025)    PRAPARE -  Transportation    • Lack of Transportation (Medical): Yes    • Lack of Transportation (Non-Medical): No   Housing Stability: Unknown (5/20/2025)    Housing Stability Vital Sign    • Unable to Pay for Housing in the Last Year: No    • Homeless in the Last Year: No   Utilities: Not At Risk (5/20/2025)    [facility] Utilities    • Threatened with loss of utilities: No     No results found.    Objective {?Quick Links Trend Vitals * Enter New Vitals * Results Review * Timeline (Adult) * Labs * Imaging * Cardiology * Procedures * Lung Cancer Screening * Surgical eConsent :58946}  There were no vitals taken for this visit.    Physical Exam  {Administrative / Billing Section (Optional):48861}

## 2025-05-20 NOTE — ASSESSMENT & PLAN NOTE
A month hx of vaginal dryness with sexual intercourse. Her partner and she tried lubricant with minimal help.  Denied vaginal bleeding or discharge    - trial estrace vaginal cream for two weeks, will insert 2g into vaginal canal 1-3 times a week  - f/u FSH and estrogen in 4 weeks  Orders:    estradiol (ESTRACE VAGINAL) 0.1 mg/g vaginal cream; Insert 2 g into the vagina once a week Insert the cream to vaginal canal 1-3 times a week for two weeks    Estrogens, total; Future    Follicle stimulating hormone; Future

## 2025-05-20 NOTE — PROGRESS NOTES
Name: Chastity Shaw      : 1975      MRN: [id number]  Encounter Provider: Danika Moralez DO  Encounter Date: 2025   Encounter department: [facility]  :  Assessment & Plan  Medicare annual wellness visit, subsequent  Reported 3 year hx of intermittent, non-exertional mid-epigastric chest pain and vaginal dryness with sexual intercourse  - review physical labs, recommended continuation of daily walking and cutting down sugary drinks (pineapple sodas) from diet.   - attempted EKG in office but machine malfunctioned. EKG referral provided for patient to have it done at the hospital  - trial pepcid 20mg BID for possible epigastric pain due to acid reflux; counseling on avoidance of possible food triggers  - obtain estrogen and FSH lab, trial estrogen cream for two weeks.   - f/u in 4 weeks for pap smear       Chest pain, unspecified type  Hx of Mitral Valve Prolapse in childhood  3 year hx of intermittent, non-reproducible, non-exertional mid-epigastric chest pain that also radiate to lower L chest. Lasts few seconds to a minute for each episode  Prior test done in hospital in  with no significant findings, troponin negative.   No cardiac workup stress test or cardiac procedure done.  Patient was given nitro to take daily initially then imdur as needed now.   Echo done in : normal EF 65%, no wall abnormalities. Mitral valve appear normal with mild regurgitation  Attempted EKG in office today but machine malfunction  Possible differentials include acid reflux, less likely angina or msk related issues    - referral to EKG provided  - trial pepcid 20mg BID   - recommend patient to stop imdur or nitro for now  - f/u estrogen and FSH lab in 4 weeks  Orders:    famotidine (PEPCID) 20 mg tablet; Take 1 tablet (20 mg total) by mouth 2 (two) times a day    ECG 12 lead; Future    Estrogens, total; Future    Follicle stimulating hormone; Future    BMI 29.0-29.9,adult  BMI  Counseling: Body mass index is 29.76 kg/m². The BMI is above normal. Nutrition recommendations include decreasing soda and/or juice intake. Exercise recommendations include exercising 3-5 times per week.        Vaginal dryness  A month hx of vaginal dryness with sexual intercourse. Her partner and she tried lubricant with minimal help.  Denied vaginal bleeding or discharge    - trial estrace vaginal cream for two weeks, will insert 2g into vaginal canal 1-3 times a week  - f/u FSH and estrogen in 4 weeks  Orders:    estradiol (ESTRACE VAGINAL) 0.1 mg/g vaginal cream; Insert 2 g into the vagina once a week Insert the cream to vaginal canal 1-3 times a week for two weeks    Estrogens, total; Future    Follicle stimulating hormone; Future    Gastroesophageal reflux disease with esophagitis without hemorrhage  Hx of acid reflux, not on any meds  Reported three year hx of intermittent chest pain as well. Negative cardiac workup in the past, was prescribed nitro and imdur to take.    - stop nitro or imdur, trial pepcid 20mg BID  - f/u in 4 weeks  Orders:    famotidine (PEPCID) 20 mg tablet; Take 1 tablet (20 mg total) by mouth 2 (two) times a day      Depression Screening and Follow-up Plan: Patient was screened for depression during today's encounter. They screened negative with a PHQ-2 score of 2.        Preventive health issues were discussed with patient, and age appropriate screening tests were ordered as noted in patient's After Visit Summary. Personalized health advice and appropriate referrals for health education or preventive services given if needed, as noted in patient's After Visit Summary.    History of Present Illness     HPI   Chastity Shaw is a 49 yo female with hx of Anxiety, chronic pain syndrome (CRPS), R OA hip pain, GERD, lumbar radiculopathy, vit D deficiency presents for medicare physical. Patient reported she's f/u ortho for her R hip OA pain. The ortho is recommending injection first  before considering surgery options. Patient is still having moderate-severe pain daily and could only take gabapentin 3600mg daily for pain control. However, she still manage to stay active by walking at least 30-40 minutes daily.     She notices mid-epigastric chest pain started three years ago and was checked at hospital with no significant cardiac findings or cardiac surgeries done. The pain is non-reproducible, non-exertional, and would start around mid-epigastric area and radiate to the L lower chest and last for seconds to a minute. She was asked to take nitro as needed few years ago. She's currently not taking any medications for acid reflux. Denied eating spicy food or drinking coffee. Former smoker, quit 14 years ago.     Patient also complains of vaginal dryness during sexual activity. She tried lubricant with not much improvement. Patient denied hot flashes, trouble sleeping. Denied recent fever, chills, palpitation, SOB, abdominal pain, changes with urination. Occasional has stress urinary incontinence from coughing or sneezing.     Patient Care Team:  Danika Moralez DO as PCP - General (Family Medicine)  Perez Darby MD as PCP - [facility] ([facility])  MD Ángel Chatman MD    Review of Systems   Constitutional:  Negative for activity change, appetite change, chills, fever and unexpected weight change.   HENT:  Negative for congestion.    Eyes:  Negative for visual disturbance.   Respiratory:  Negative for cough and shortness of breath.    Cardiovascular:  Positive for chest pain. Negative for palpitations and leg swelling.        Mid-epigastric pain   Gastrointestinal:  Negative for abdominal pain, blood in stool, constipation, diarrhea, nausea and vomiting.   Genitourinary:  Negative for difficulty urinating, dysuria, hematuria, pelvic pain, urgency, vaginal bleeding, vaginal discharge and vaginal pain.   Musculoskeletal:         Chronic R hip OA with pain with activities    Skin:  Negative for color change and rash.   Allergic/Immunologic: Positive for environmental allergies.   Neurological:  Positive for headaches. Negative for dizziness.        Tension headache   Psychiatric/Behavioral:  Negative for sleep disturbance and suicidal ideas.    All other systems reviewed and are negative.    Medical History Reviewed by provider this encounter:  Tobacco  Allergies  Meds  Problems  Med Hx  Surg Hx  Fam Hx       Annual Wellness Visit Questionnaire   Chastity is here for her Subsequent Wellness visit. Last Medicare Wellness visit information reviewed, patient interviewed and updates made to the record today.      Health Risk Assessment:   Patient rates overall health as fair. Patient feels that their physical health rating is same. Patient is satisfied with their life. Eyesight was rated as slightly worse. Hearing was rated as same. Patient feels that their emotional and mental health rating is slightly better. Patients states they are never, rarely angry. Patient states they are sometimes unusually tired/fatigued. Pain experienced in the last 7 days has been a lot. Patient's pain rating has been 9/10. Patient states that she has experienced no weight loss or gain in last 6 months.     Depression Screening:   PHQ-2 Score: 2      Fall Risk Screening:   In the past year, patient has experienced: history of falling in past year    Number of falls: 2 or more  Injured during fall?: Yes    Feels unsteady when standing or walking?: Yes    Worried about falling?: Yes      Urinary Incontinence Screening:   Patient has leaked urine accidently in the last six months.     Home Safety:  Patient has trouble with stairs inside or outside of their home. Patient has working smoke alarms and has working carbon monoxide detector. Home safety hazards include: none.     Nutrition:   Current diet is Regular and Limited junk food.     Medications:   Patient is not currently taking any over-the-counter  supplements. Patient is able to manage medications.     Activities of Daily Living (ADLs)/Instrumental Activities of Daily Living (IADLs):   Walk and transfer into and out of bed and chair?: Yes  Dress and groom yourself?: Yes    Bathe or shower yourself?: Yes    Feed yourself? Yes  Do your laundry/housekeeping?: Yes  Manage your money, pay your bills and track your expenses?: Yes  Make your own meals?: Yes    Do your own shopping?: Yes    Previous Hospitalizations:   Any hospitalizations or ED visits within the last 12 months?: Yes    How many hospitalizations have you had in the last year?: 1-2    Advance Care Planning:   Living will: No    Durable POA for healthcare: No    Advanced directive: No      Preventive Screenings      Cardiovascular Screening:    General: Screening Current      Diabetes Screening:     General: Screening Current      Colorectal Cancer Screening:     General: Patient Declines    Due for: Cologuard      Breast Cancer Screening:     General: Patient Declines    Due for: Mammogram        Cervical Cancer Screening:    General: Risks and Benefits Discussed    Due for: Cervical Pap Smear      Osteoporosis Screening:    General: Screening Not Indicated      Abdominal Aortic Aneurysm (AAA) Screening:        General: Screening Not Indicated      Lung Cancer Screening:     General: Screening Not Indicated      Hepatitis C Screening:    General: Screening Current    Immunizations:  - Immunizations due: Zoster (Shingrix)    Screening, Brief Intervention, and Referral to Treatment (SBIRT)     Screening  Typical number of drinks in a day: 0  Typical number of drinks in a week: 0  Interpretation: Low risk drinking behavior.    AUDIT-C Screenin) How often did you have a drink containing alcohol in the past year? monthly or less  2) How many drinks did you have on a typical day when you were drinking in the past year? 1 to 2  3) How often did you have 6 or more drinks on one occasion in the past year?  never    AUDIT-C Score: 1  Interpretation: Score 0-2 (female): Negative screen for alcohol misuse    Single Item Drug Screening:  How often have you used an illegal drug (including marijuana) or a prescription medication for non-medical reasons in the past year? never    Single Item Drug Screen Score: 0  Interpretation: Negative screen for possible drug use disorder    Social Drivers of Health     Financial Resource Strain: Not At Risk (4/9/2025)    Received from [facility]    Financial Insecurity     In the last 12 months did you skip medications to save money?: No     In the last 12 months was there a time when you needed to see a doctor but could not because of cost?: No   Food Insecurity: No Food Insecurity (5/20/2025)    Nursing - Inadequate Food Risk Classification     Worried About Running Out of Food in the Last Year: Never true     Ran Out of Food in the Last Year: Never true   Recent Concern: Food Insecurity - Food Insecurity Present (5/15/2025)    Nursing - Inadequate Food Risk Classification     Worried About Running Out of Food in the Last Year: Sometimes true     Ran Out of Food in the Last Year: Sometimes true   Transportation Needs: Unmet Transportation Needs (5/20/2025)    PRAPARE - Transportation     Lack of Transportation (Medical): Yes     Lack of Transportation (Non-Medical): No   Housing Stability: Unknown (5/20/2025)    Housing Stability Vital Sign     Unable to Pay for Housing in the Last Year: No     Homeless in the Last Year: No   Utilities: Not At Risk (5/20/2025)    [facility] Utilities     Threatened with loss of utilities: No     No results found.    Objective   /78 (BP Location: Left arm)   Pulse 86   Temp 98.7 °F (37.1 °C)   Resp 18   Wt 82.4 kg (181 lb 9.6 oz)   LMP 03/02/2020 (Approximate)   SpO2 97%   BMI 29.76 kg/m²     Physical Exam  Vitals and nursing note reviewed.   Constitutional:       General: She is not in acute distress.     Appearance: Normal  appearance. She is well-developed. She is not ill-appearing.   HENT:      Head: Normocephalic and atraumatic.      Right Ear: Tympanic membrane, ear canal and external ear normal. There is no impacted cerumen.      Left Ear: Tympanic membrane, ear canal and external ear normal. There is no impacted cerumen.      Nose: Nose normal. No congestion.      Mouth/Throat:      Mouth: Mucous membranes are moist.      Pharynx: Oropharynx is clear. No oropharyngeal exudate or posterior oropharyngeal erythema.     Eyes:      General:         Right eye: No discharge.         Left eye: No discharge.      Extraocular Movements: Extraocular movements intact.      Conjunctiva/sclera: Conjunctivae normal.      Pupils: Pupils are equal, round, and reactive to light.       Cardiovascular:      Rate and Rhythm: Normal rate and regular rhythm.      Pulses: Normal pulses.      Heart sounds: Murmur heard.      Comments: 1/6 late systolic murmur around R lower chest  Pulmonary:      Effort: Pulmonary effort is normal. No respiratory distress.      Breath sounds: Normal breath sounds. No wheezing.   Abdominal:      General: Abdomen is flat. Bowel sounds are normal. There is no distension.      Palpations: Abdomen is soft. There is no mass.      Tenderness: There is no abdominal tenderness. There is no guarding or rebound.     Musculoskeletal:         General: Tenderness present. No swelling. Normal range of motion.      Cervical back: Normal range of motion and neck supple.      Right lower leg: No edema.      Left lower leg: No edema.      Comments: Chronic R hip pain with all ROM     Skin:     General: Skin is warm and dry.      Capillary Refill: Capillary refill takes less than 2 seconds.      Findings: No rash.     Neurological:      General: No focal deficit present.      Mental Status: She is alert and oriented to person, place, and time.     Psychiatric:         Mood and Affect: Mood normal.       Administrative Statements   I have  spent a total time of 30 minutes in caring for this patient on the day of the visit/encounter including Impressions, Counseling / Coordination of care, Documenting in the medical record, Reviewing/placing orders in the medical record (including tests, medications, and/or procedures), Obtaining or reviewing history  , and Communicating with other healthcare professionals .

## 2025-05-21 LAB — ESTROGEN SERPL-MCNC: 61 PG/ML

## 2025-05-22 ENCOUNTER — RESULTS FOLLOW-UP (OUTPATIENT)
Dept: FAMILY MEDICINE CLINIC | Facility: HOME HEALTHCARE | Age: 50
End: 2025-05-22

## 2025-05-23 ENCOUNTER — TELEPHONE (OUTPATIENT)
Dept: FAMILY MEDICINE CLINIC | Facility: HOME HEALTHCARE | Age: 50
End: 2025-05-23

## 2025-05-27 ENCOUNTER — PATIENT OUTREACH (OUTPATIENT)
Dept: CASE MANAGEMENT | Facility: OTHER | Age: 50
End: 2025-05-27

## 2025-05-27 DIAGNOSIS — Z13.9 ENCOUNTER FOR SCREENING INVOLVING SOCIAL DETERMINANTS OF HEALTH (SDOH): Primary | ICD-10-CM

## 2025-05-28 ENCOUNTER — CLINICAL SUPPORT (OUTPATIENT)
Dept: FAMILY MEDICINE CLINIC | Facility: HOME HEALTHCARE | Age: 50
End: 2025-05-28

## 2025-05-28 ENCOUNTER — RESULTS FOLLOW-UP (OUTPATIENT)
Dept: FAMILY MEDICINE CLINIC | Facility: HOME HEALTHCARE | Age: 50
End: 2025-05-28

## 2025-05-28 ENCOUNTER — PATIENT OUTREACH (OUTPATIENT)
Dept: CASE MANAGEMENT | Facility: OTHER | Age: 50
End: 2025-05-28

## 2025-05-28 DIAGNOSIS — R07.9 CHEST PAIN, UNSPECIFIED TYPE: Primary | ICD-10-CM

## 2025-05-28 NOTE — PROGRESS NOTES
KANCHAN PFEIFFER received a referral from [facility] report regarding patient's need for social work follow up due to SDOH. KANCHAN PFEIFFER assessed patient for needs related to SDOH including transportation, finances, food insecurity, etc. Patient states she was approved for SNAP and her benefits will begin next month. Pt is also aware of her local food bank. Pt states she does have access to transportation to her medical appointments via her insurance plan. Pt does report having transportation barrier outside of medical appointments. Discussed applying for [facility] transit as a potential option. Patient also states she will need some assistance with a [employer] electric bill as she expected to receive her first bill soon and is waiting on her SSD check. KANCHAN PFEIFFER reviewed Find Help for potential options and provided pt with contact info for [facility] [employer] Utility assistance program. Patient has dual Medicare/Medicaid and reports feeling insurance coverage is adequate in meeting their needs, no difficulty affording copays or medications at this time. Patient denies any additional concerns. KANCHAN PFEIFFER will close referral,however, will remain available for additional assistance/support if needed, via new order.

## 2025-05-28 NOTE — PROGRESS NOTES
ECG Completed. No complications. ECG looked over by Davin Duarte PA-C prior to patient leaving office and was given the okay for patient to leave. Patient notified that results will be send to Dr. Moralez - patient verbalized understanding.  ECG scanned as a preliminary result to Dr. Moralez.

## 2025-06-04 ENCOUNTER — OFFICE VISIT (OUTPATIENT)
Dept: FAMILY MEDICINE CLINIC | Facility: HOME HEALTHCARE | Age: 50
End: 2025-06-04
Payer: COMMERCIAL

## 2025-06-04 VITALS
TEMPERATURE: 97.4 F | HEART RATE: 83 BPM | SYSTOLIC BLOOD PRESSURE: 90 MMHG | HEIGHT: 66 IN | WEIGHT: 180.8 LBS | RESPIRATION RATE: 18 BRPM | DIASTOLIC BLOOD PRESSURE: 62 MMHG | BODY MASS INDEX: 29.06 KG/M2 | OXYGEN SATURATION: 98 %

## 2025-06-04 DIAGNOSIS — F41.9 ANXIETY: ICD-10-CM

## 2025-06-04 DIAGNOSIS — K21.00 GASTROESOPHAGEAL REFLUX DISEASE WITH ESOPHAGITIS WITHOUT HEMORRHAGE: ICD-10-CM

## 2025-06-04 DIAGNOSIS — R21 RASH: Primary | ICD-10-CM

## 2025-06-04 DIAGNOSIS — N89.8 VAGINAL DRYNESS: ICD-10-CM

## 2025-06-04 PROCEDURE — 99213 OFFICE O/P EST LOW 20 MIN: CPT

## 2025-06-04 RX ORDER — ESTRADIOL 2 MG/1
2 TABLET ORAL DAILY
Qty: 14 TABLET | Refills: 0 | Status: SHIPPED | OUTPATIENT
Start: 2025-06-04 | End: 2025-06-04 | Stop reason: CLARIF

## 2025-06-04 RX ORDER — LORAZEPAM 0.5 MG/1
0.5 TABLET ORAL EVERY 6 HOURS PRN
Qty: 30 TABLET | Refills: 0 | Status: SHIPPED | OUTPATIENT
Start: 2025-06-04

## 2025-06-04 RX ORDER — KETOROLAC TROMETHAMINE 10 MG/1
10 TABLET, FILM COATED ORAL EVERY 6 HOURS PRN
COMMUNITY
Start: 2025-02-23 | End: 2026-02-23

## 2025-06-04 RX ORDER — MOMETASONE FUROATE 1 MG/ML
SOLUTION TOPICAL DAILY
Qty: 30 ML | Refills: 0 | Status: SHIPPED | OUTPATIENT
Start: 2025-06-04

## 2025-06-04 RX ORDER — ESTRADIOL 0.1 MG/G
2 CREAM VAGINAL WEEKLY
Qty: 42.5 G | Refills: 0 | Status: SHIPPED | OUTPATIENT
Start: 2025-06-04 | End: 2025-06-18

## 2025-06-04 RX ORDER — TRAMADOL HYDROCHLORIDE 50 MG/1
50 TABLET ORAL EVERY 6 HOURS PRN
COMMUNITY
Start: 2025-03-03

## 2025-06-04 RX ORDER — BENZOCAINE/MENTHOL 6 MG-10 MG
LOZENGE MUCOUS MEMBRANE 4 TIMES DAILY PRN
Qty: 25 G | Refills: 0 | Status: SHIPPED | OUTPATIENT
Start: 2025-06-04 | End: 2025-06-04 | Stop reason: CLARIF

## 2025-06-04 NOTE — ASSESSMENT & PLAN NOTE
Takes ativan 0.5mg once prn or every 3 days for anxiety and before medical procedure (hip injection)  HAROON score 13    - PDMP reviewed, refill given  Orders:    LORazepam (ATIVAN) 0.5 mg tablet; Take 1 tablet (0.5 mg total) by mouth every 6 (six) hours as needed for anxiety

## 2025-06-04 NOTE — ASSESSMENT & PLAN NOTE
Patient trial pepcid 20mg for past two weeks, with minimal improvement  Still has intermittent pain in mid-epigastric area  EKG done a week ago with NSR, no ST changes. She took nitro when she has the pain.    - encourage patient to stop taking nitro now since her EKG is normal  - increase pepcid to 40mg daily  - f/u in 4 weeks.

## 2025-06-04 NOTE — PROGRESS NOTES
Name: Chastity Shaw      : 1975      MRN: [id number]  Encounter Provider: Danika Moralez DO  Encounter Date: 2025   Encounter department: [facility]  :  Assessment & Plan  Rash  Ongoing intermittent eczema rash around upper chest and neck area for a year. Recently the rash has been spreading.  Patient tried OTC hydrocortisone cream and baby powder after shower with not much improvement.  Denied recent change of her detergent, shampoo/body wash  On exam, dry, scaly red patches on tip of shoulders, lower neck, and upper chest area. No discharge, papules noted.     - trial mometasone lotion, apply after shower  - encourage patient to pat dry after shower and apply moisturizer  - lukewarm water for bathing, limit time to 10-15 minutes.   - Avoid contact with known allergens  Orders:    mometasone (ELOCON) 0.1 % lotion; Apply topically daily    Vaginal dryness  Tried estrace 2g cream three times a week with minimal improvement    - continue trial estrace 2g cream, increase to once daily for two weeks  - f/u in 2 weeks    Orders:    estradiol (ESTRACE VAGINAL) 0.1 mg/g vaginal cream; Insert 2 g into the vagina once a week for 14 days Insert the cream to vaginal canal once daily for two weeks    Anxiety  Takes ativan 0.5mg once prn or every 3 days for anxiety and before medical procedure (hip injection)  HAROON score 13    - PDMP reviewed, refill given  Orders:    LORazepam (ATIVAN) 0.5 mg tablet; Take 1 tablet (0.5 mg total) by mouth every 6 (six) hours as needed for anxiety    Gastroesophageal reflux disease with esophagitis without hemorrhage  Patient trial pepcid 20mg for past two weeks, with minimal improvement  Still has intermittent pain in mid-epigastric area  EKG done a week ago with NSR, no ST changes. She took nitro when she has the pain.    - encourage patient to stop taking nitro now since her EKG is normal  - increase pepcid to 40mg daily  - f/u in 4 weeks.     "           History of Present Illness   HPI  Chastity Shaw is a 49 yo female with hx of anxiety, chronic pain syndrome (CRPS), R OA hip pain, GERD, lumbar radiculopathy, vit D deficiency, s/p total laparoscopic hysterectomy, b/l salpingo-oophorectomy and cystoscopy on 08/2021 due to chronic pain presents for follow up. Patient has skin rashes around lower neck and upper chest area for past one year. It was controlled intermittently with OTC hydrocortisone cream but recently the rash is spreading. Patient denied itchiness and feels dry and scaly on exam. No discharge or bleeding. No recent change with detergent or body wash.   Patient also tried estrace vaginal cream 2g for past two weeks. She used it three times a week so far with minimal improvement. She denied vaginal bleeding, discharge, pain. She mainly has dryness during sexual intercourse. LMP two years ago. Last pap smear 5 years ago normal.     Review of Systems   Constitutional:  Negative for chills and fever.   HENT:  Negative for congestion.    Eyes:  Negative for visual disturbance.   Respiratory:  Negative for cough and shortness of breath.    Cardiovascular:  Negative for chest pain and palpitations.   Gastrointestinal:  Positive for abdominal pain. Negative for constipation, diarrhea, nausea and vomiting.        Intermittent mid-epigastric pain   Genitourinary:  Negative for dysuria, hematuria, vaginal bleeding, vaginal discharge and vaginal pain.   Musculoskeletal:  Negative for arthralgias and back pain.   Skin:  Negative for color change and rash.   Neurological:  Negative for headaches.   Psychiatric/Behavioral:  Negative for sleep disturbance. The patient is nervous/anxious.    All other systems reviewed and are negative.      Objective   BP 90/62 (BP Location: Left arm, Patient Position: Sitting, Cuff Size: Large)   Pulse 83   Temp (!) 97.4 °F (36.3 °C) (Tympanic)   Resp 18   Ht 5' 5.5\" (1.664 m)   Wt 82 kg (180 lb 12.8 oz)   LMP " 03/02/2020 (Approximate)   SpO2 98%   BMI 29.63 kg/m²      Physical Exam  Vitals and nursing note reviewed.   Constitutional:       General: She is not in acute distress.     Appearance: Normal appearance. She is well-developed. She is not ill-appearing.   HENT:      Head: Normocephalic and atraumatic.      Right Ear: External ear normal.      Left Ear: External ear normal.      Nose: Nose normal. No congestion.      Mouth/Throat:      Mouth: Mucous membranes are moist.      Pharynx: Oropharynx is clear. No oropharyngeal exudate or posterior oropharyngeal erythema.     Eyes:      General:         Right eye: No discharge.         Left eye: No discharge.      Extraocular Movements: Extraocular movements intact.      Conjunctiva/sclera: Conjunctivae normal.      Pupils: Pupils are equal, round, and reactive to light.       Cardiovascular:      Rate and Rhythm: Normal rate and regular rhythm.      Pulses: Normal pulses.      Heart sounds: Normal heart sounds. No murmur heard.  Pulmonary:      Effort: Pulmonary effort is normal. No respiratory distress.      Breath sounds: Normal breath sounds. No wheezing.   Abdominal:      General: Abdomen is flat. Bowel sounds are normal. There is no distension.      Palpations: Abdomen is soft. There is no mass.      Tenderness: There is no abdominal tenderness. There is no guarding or rebound.   Genitourinary:     General: Normal vulva.      Vagina: No vaginal discharge.      Rectum: Guaiac result negative.     Musculoskeletal:         General: No swelling. Normal range of motion.      Cervical back: Normal range of motion and neck supple.      Right lower leg: No edema.      Left lower leg: No edema.     Skin:     General: Skin is warm and dry.      Capillary Refill: Capillary refill takes less than 2 seconds.      Findings: No rash.     Neurological:      Mental Status: She is alert and oriented to person, place, and time.     Psychiatric:         Mood and Affect: Mood normal.        Administrative Statements   I have spent a total time of 20 minutes in caring for this patient on the day of the visit/encounter including Impressions, Counseling / Coordination of care, Documenting in the medical record, Reviewing/placing orders in the medical record (including tests, medications, and/or procedures), Obtaining or reviewing history  , and Communicating with other healthcare professionals .

## 2025-06-04 NOTE — ASSESSMENT & PLAN NOTE
Tried estrace 2g cream three times a week with minimal improvement    - continue trial estrace 2g cream, increase to once daily for two weeks  - f/u in 2 weeks    Orders:    estradiol (ESTRACE VAGINAL) 0.1 mg/g vaginal cream; Insert 2 g into the vagina once a week for 14 days Insert the cream to vaginal canal once daily for two weeks

## 2025-06-06 ENCOUNTER — CONSULT (OUTPATIENT)
Dept: PAIN MEDICINE | Facility: CLINIC | Age: 50
End: 2025-06-06
Payer: COMMERCIAL

## 2025-06-06 VITALS
WEIGHT: 184 LBS | TEMPERATURE: 98 F | BODY MASS INDEX: 29.57 KG/M2 | OXYGEN SATURATION: 98 % | HEIGHT: 66 IN | RESPIRATION RATE: 16 BRPM | HEART RATE: 82 BPM

## 2025-06-06 DIAGNOSIS — G89.4 CHRONIC PAIN SYNDROME: ICD-10-CM

## 2025-06-06 DIAGNOSIS — G90.511 COMPLEX REGIONAL PAIN SYNDROME TYPE 1 OF RIGHT UPPER EXTREMITY: Primary | ICD-10-CM

## 2025-06-06 PROCEDURE — 99204 OFFICE O/P NEW MOD 45 MIN: CPT | Performed by: ANESTHESIOLOGY

## 2025-06-06 PROCEDURE — G2211 COMPLEX E/M VISIT ADD ON: HCPCS | Performed by: ANESTHESIOLOGY

## 2025-06-06 NOTE — PROGRESS NOTES
Name: Chastity Shwa      : 1975      MRN: [id number]  Encounter Provider: Carlitos Alonso MD  Encounter Date: 2025   Encounter department: [facility] [place]  :  Assessment & Plan    Patient had a spinal cord stimulator a using [employer] the company for cervical and lumbar CRPS.  Patient reported no relief from symptoms.  Patient also had sympathetic nerve block to providing relief.  Patient had the trial which provided her with relief however the implant did not.  Spinal cord stimulator was informed 3 months.  Patient had spinal cord stimulator battery and leads removed.  Patient had a paddle placed in the thoracic.        My impressions and treatment recommendations were discussed in detail with the patient who verbalized understanding and had no further questions.  Discharge instructions were provided. I personally saw and examined the patient and I agree with the above discussed plan of care.    History of Present Illness     Chastity Shaw is a 50 y.o. female with complaints of neck pain, right arm pain, low back pain, right leg pain with chronic pains and secondary cervical radiculopathy, lumbar radiculopathy presents to office for initial consultation.  Patient reports pain started status post work-related injury in  and has been plaguing her for 12 years and 6 months.  Patient reports her pain is 9-10, constant without atypical pain pattern.  Patient reports her pain as burning, cramping, shooting, numbness, sharp, cutting, pins-and-needles, pressure-like, throbbing, dull/aching with weakness in right upper and right lower extremity.  Patient reports pain is increased by laying down, standing, bending, sitting, walking, exercise, relaxation.  Nothing decreases her pain.  Pain does not change with kneeling, coughing/sneezing, bowel movements.  Patient reported no relief from surgery, nerve block, physical therapy, exercise, heat/ice treatment, TENS unit.   "Patient is currently taking gabapentin which provided no relief.    Review of Systems   Musculoskeletal:  Positive for back pain, gait problem and joint swelling.   Neurological:  Positive for weakness.   All other systems reviewed and are negative.    Medical History Reviewed by provider this encounter:     .       Objective   Pulse 82   Temp 98 °F (36.7 °C) (Temporal)   Resp 16   Ht 5' 5.5\" (1.664 m)   Wt 83.5 kg (184 lb)   LMP 03/02/2020 (Approximate)   SpO2 98%   BMI 30.15 kg/m²         Physical Exam  Constitutional: normal, well developed, well nourished, alert, in no distress and non-toxic and no overt pain behavior. and obese  Eyes: anicteric  HEENT: grossly intact  Neck: supple, symmetric, trachea midline and no masses   Pulmonary: even and unlabored  Cardiovascular: No edema or pitting edema present  Skin: Normal without rashes or lesions and well hydrated  Psychiatric: Mood and affect appropriate  Neurologic: Cranial Nerves II-XII grossly intact  Musculoskeletal: antalgic    Cervical Spine examination demonstrates. Decreased ROM secondary to pain with lateral rotation to the left/right and bending to the left/right, in addition to neck flexion. 5/5 upper extremity strength in all muscle groups bilaterally. Negative Spurling's maneuver to the b/l Ue, sensitivity to light touch intact b/l Ue.    Lumbar/Sacral Spine examination demonstrates.  Full range of motion lumbar spine with pain upon: flexion, lateral rotation to the left/right, and bending to the left/right.  Bilateral lumbar paraspinals tender to palpation. Muscle spasms noted in the lumbar area bilaterally. 4/5 lower extremity strength in all muscle groups bilaterally. Positive seated straight leg raise for bilateral lower extremities.  Sensitivity to light touch intact bilateral lower extremities. 2+ reflexes in the patella and Achilles.  No ankle clonus      Findings: For the purposes of this dictation, the lumbar vertebrae are labeled "   from a caudal to cranial direction, the first vertebra with lumbar morphology is   labeled as L5.     Conus and lower thoracic cord: The conus is normal in position and signal   intensity.     Marrow: There is no abnormal focal marrow replacement on the STIR images.     Alignment: The lumbar vertebra are normal in alignment. The vertebral body   heights are maintained.     L1-L2: There is no spinal stenosis or foraminal narrowing.     L2-L3 : There is a tiny left subarticular disc protrusion and bilateral facet   arthrosis causing mild spinal stenosis without foraminal narrowing.     L3-L4: There is a disc bulge and bilateral facet arthrosis causing mild spinal   stenosis and mild bilateral foraminal narrowing.     L4-L5: There is a disc bulge, facet arthrosis causing mild spinal stenosis and   mild bilateral foraminal narrowing.     L5-S1: There is bilateral facet arthrosis without significant spinal stenosis or   foraminal narrowing.     There is no significant change in the multilevel degenerative disc disease when   compared to the prior study.   Impression    Impression:  1.  Findings most consistent with anterior labral tear with an adjacent 7 mm  para labral cyst. MR arthrogram is recommended for further characterization, if  clinically indicated.    2.  Mild to moderate right hip primary osteoarthritis.    3.  Mild hamstring tendinosis.            Workstation:[id number]  Narrative    History: Right hip pain. Fracture?    Technique: Multiplanar, multisequence, noncontrast MRI of the right hip.    Findings:    Pelvic bones and hip cartilage: Small marginal acetabular osteophytes are seen.  Center edge angle measures approximately 30 degrees.    Femur: There is thinning of the acetabular and femoral head articular cartilage  in the anterior hip joint.    Hip Labrum: There is heterogeneous signal in the anterior labrum with an  adjacent 7 mm cyst.    Tendons: There is heterogeneous signal hamstring tendon  origins.    Muscles: The visualized musculature is within normal limits.    Other: Normal.  Exam End: 02/19/25 10:37 AM    Specimen Collected: 02/19/25 10:38 AM Last Resulted: 02/19/25 10:41 AM   Received From: [facility]  Result Received: 04/18/25 11:16 AM    View Encounter

## 2025-06-09 ENCOUNTER — TELEPHONE (OUTPATIENT)
Dept: PAIN MEDICINE | Facility: CLINIC | Age: 50
End: 2025-06-09

## 2025-06-09 DIAGNOSIS — G90.50 RSD (REFLEX SYMPATHETIC DYSTROPHY): ICD-10-CM

## 2025-06-09 DIAGNOSIS — M54.16 LUMBAR RADICULOPATHY: ICD-10-CM

## 2025-06-09 DIAGNOSIS — M54.9 MID BACK PAIN: Primary | ICD-10-CM

## 2025-06-09 DIAGNOSIS — G90.521 COMPLEX REGIONAL PAIN SYNDROME TYPE 1 OF RIGHT LOWER EXTREMITY: ICD-10-CM

## 2025-06-09 NOTE — TELEPHONE ENCOUNTER
S/w who states she would like to move forward w/ Mauro aburto. Pt asking what would be the next steps moving forward?  LOV 6/6/25 RM     Pt needing Thoracic MRI?  Psych eval?    Pls advise. Thank you!    Pt also states that she had a great experience meeting RM in consult and really apprec how she was treated as a person and listened to.

## 2025-06-11 ENCOUNTER — HOSPITAL ENCOUNTER (OUTPATIENT)
Dept: RADIOLOGY | Facility: MEDICAL CENTER | Age: 50
Discharge: HOME | End: 2025-06-11
Attending: ORTHOPAEDIC SURGERY
Payer: COMMERCIAL

## 2025-06-11 VITALS
SYSTOLIC BLOOD PRESSURE: 119 MMHG | RESPIRATION RATE: 18 BRPM | DIASTOLIC BLOOD PRESSURE: 82 MMHG | OXYGEN SATURATION: 99 % | TEMPERATURE: 97.9 F | HEART RATE: 77 BPM

## 2025-06-11 DIAGNOSIS — M16.11 PRIMARY OSTEOARTHRITIS OF ONE HIP, RIGHT: ICD-10-CM

## 2025-06-11 PROBLEM — N92.6 IRREGULAR PERIODS: Status: RESOLVED | Noted: 2019-06-12 | Resolved: 2025-06-11

## 2025-06-11 PROBLEM — J45.20 MILD INTERMITTENT ASTHMA WITHOUT COMPLICATION: Status: ACTIVE | Noted: 2024-09-19

## 2025-06-11 PROCEDURE — 20610 DRAIN/INJ JOINT/BURSA W/O US: CPT | Performed by: PHYSICAL MEDICINE & REHABILITATION

## 2025-06-11 PROCEDURE — 77002 NEEDLE LOCALIZATION BY XRAY: CPT

## 2025-06-11 PROCEDURE — 77002 NEEDLE LOCALIZATION BY XRAY: CPT | Performed by: PHYSICAL MEDICINE & REHABILITATION

## 2025-06-11 PROCEDURE — A9585 GADOBUTROL INJECTION: HCPCS | Performed by: PHYSICAL MEDICINE & REHABILITATION

## 2025-06-11 RX ORDER — METHYLPREDNISOLONE ACETATE 40 MG/ML
40 INJECTION, SUSPENSION INTRA-ARTICULAR; INTRALESIONAL; INTRAMUSCULAR; PARENTERAL; SOFT TISSUE ONCE
Status: COMPLETED | OUTPATIENT
Start: 2025-06-11 | End: 2025-06-11

## 2025-06-11 RX ORDER — ROPIVACAINE HYDROCHLORIDE 2 MG/ML
3 INJECTION, SOLUTION EPIDURAL; INFILTRATION; PERINEURAL ONCE
Status: COMPLETED | OUTPATIENT
Start: 2025-06-11 | End: 2025-06-11

## 2025-06-11 RX ORDER — GADOBUTROL 604.72 MG/ML
2 INJECTION INTRAVENOUS ONCE
Status: COMPLETED | OUTPATIENT
Start: 2025-06-11 | End: 2025-06-11

## 2025-06-11 RX ADMIN — GADOBUTROL 2 ML: 604.72 INJECTION INTRAVENOUS at 08:16

## 2025-06-11 RX ADMIN — ROPIVACAINE HYDROCHLORIDE 3 ML: 2 INJECTION, SOLUTION EPIDURAL; INFILTRATION at 08:16

## 2025-06-11 RX ADMIN — METHYLPREDNISOLONE ACETATE 40 MG: 40 INJECTION, SUSPENSION INTRA-ARTICULAR; INTRALESIONAL; INTRAMUSCULAR; SOFT TISSUE at 08:16

## 2025-06-11 NOTE — H&P
History of Present Illness: The patient is a 50 y.o. female who presents with complaints of right hip pain    Past Medical History[1]    Past Surgical History[2]    Current Medications[3]    Allergies[4]    Physical Exam:   Vitals:    06/11/25 0801   BP: 119/82   Pulse: 73   Resp: 18   Temp: 97.9 °F (36.6 °C)   SpO2: 98%     General: Awake, Alert, Oriented x 3, Mood and affect appropriate  Respiratory: Respirations even and unlabored  Cardiovascular: Peripheral pulses intact; no edema  Musculoskeletal Exam: right hip pain    ASA Score: 3    Patient/Chart Verification  Patient ID Verified: Verbal  ID Band Applied: No  Consents Confirmed: To be obtained in the Procedural area  Interval H&P(within 24 hr) Complete (required for Outpatients and Surgery Admit only): To be obtained in the Procedural area  Allergies Reviewed: Yes  Anticoag/NSAID held?: NA  Currently on antibiotics?: No  Pregnancy denied?: Yes    Assessment:   1. Primary osteoarthritis of one hip, right        Plan: right hip joint CSI         [1]   Past Medical History:  Diagnosis Date    Anxiety     Asthma     Back pain     Chronic narcotic dependence (HCC)     CRPS (complex regional pain syndrome type I)     CRPS (complex regional pain syndrome type I)     RLE and RUE    Current mild episode of major depressive disorder without prior episode (HCC) 10/15/2018    Depression     Fracture     2015 right knee fracture    Latex allergy     Limb alert care status     RUE, RLE    Mobility impaired     uses cane    Optic neuritis, left     PONV (postoperative nausea and vomiting)     RSD (reflex sympathetic dystrophy)     CRPS RUE/RLE    Wears glasses    [2]   Past Surgical History:  Procedure Laterality Date    BREAST LUMPECTOMY      Left    IMPLANTATION / PLACEMENT EPIDURAL NEUROSTIMULATOR ELECTRODES Right     KNEE ARTHROSCOPY Right     MI INSJ/RPLCMT SPINAL NPG/RCVR POCKET CRTJ&CONNJ Right 7/18/2017    Procedure: PLACEMENT OF NEW SPINAL COLUMN STIMULATOR IN THE  RIGHT BUTTOCK THROUGH SEPERATE INCISION WITH TUNNELING OF LEAD; POSSIBLE PLACEMENT OF EXTENSION;  Surgeon: Ángel Mercado MD;  Location: [facility];  Service: Neurosurgery    IL INSJ/RPLCMT SPINAL NPG/RCVR POCKET CRTJ&CONNJ N/A 4/25/2018    Procedure: Removal of left flank spinal cord stimulator generator, placement of new spinal cord stimulator generator in left buttock through separate incision;  Surgeon: Ángel Mercado MD;  Location: [facility];  Service: Neurosurgery    IL GLORIA IMPLTJ NSTIM ELTRDS PLATE/PADDLE EDRL N/A 7/14/2016    Procedure: LAMINECTOMY  T10 -11 WITH  PLACEMENT OF  SPINAL CORD STIMULATOR AND PULSE GEBNERATOR  IMPULSE MONITORING ;  Surgeon: Nicole Wylie MD;  Location: [facility];  Service: Orthopedics    IL REVJ/RMVL IMPL SPI NPG/RCVR DTCH CONNJ ELTRD RA Right 7/18/2017    Procedure: REMOVAL RIGHT FLANK SPINAL COLUMN STIMULATOR IPG;  Surgeon: Ángel Mercado MD;  Location: [facility];  Service: Neurosurgery    IL REVJ/RMVL IMPL SPI NPG/RCVR DTCH CONNJ ELTRD RA Bilateral 3/1/2019    Procedure: REMOVAL OF IMPLANTABLE PULSE GENERATOR (IPG) DORSAL SPINAL COLUMN STIMULATOR  (DCS) AND GENERATOR OF CERVICAL AND THORACIC (NEUROMONITORING);  Surgeon: Maxi Hector MD;  Location: [facility];  Service: Neurosurgery    TUBAL LIGATION Bilateral 2003    WISDOM TOOTH EXTRACTION     [3]   Current Outpatient Medications:     albuterol (PROVENTIL HFA,VENTOLIN HFA) 90 mcg/act inhaler, Inhale 2 puffs as needed for wheezing, Disp: 1 each, Rfl: 0    aspirin (ECOTRIN LOW STRENGTH) 81 mg EC tablet, Take 81 mg by mouth in the morning., Disp: , Rfl:     EPINEPHrine (EpiPen 2-Cas) 0.3 mg/0.3 mL SOAJ, Inject 0.3 mL (0.3 mg total) into a muscle once for 1 dose, Disp: 0.3 mL, Rfl: 2    estradiol (ESTRACE VAGINAL) 0.1 mg/g vaginal cream, Insert 2 g into the vagina once a week for 14 days Insert the cream to vaginal canal once daily for two weeks, Disp: 42.5 g, Rfl: 0    famotidine (PEPCID) 20 mg tablet, Take 1 tablet (20  "mg total) by mouth 2 (two) times a day, Disp: 60 tablet, Rfl: 0    Fluticasone-Salmeterol (Advair) 250-50 mcg/dose inhaler, , Disp: , Rfl:     gabapentin (NEURONTIN) 600 MG tablet, 2 tablets in the morning and 2 tablets in the evening and 2 tablets before bedtime., Disp: , Rfl:     ketorolac (TORADOL) 10 mg tablet, Take 10 mg by mouth every 6 (six) hours as needed, Disp: , Rfl:     LORazepam (ATIVAN) 0.5 mg tablet, Take 1 tablet (0.5 mg total) by mouth every 6 (six) hours as needed for anxiety, Disp: 30 tablet, Rfl: 0    mometasone (ELOCON) 0.1 % lotion, Apply topically daily, Disp: 30 mL, Rfl: 0    traMADol (ULTRAM) 50 mg tablet, Take 50 mg by mouth every 6 (six) hours as needed for severe pain, Disp: , Rfl:   [4]   Allergies  Allergen Reactions    Bee Venom Anaphylaxis    Codeine Anaphylaxis     Reaction Date: 26Apr2011;   Possible other narcotics; pt may take Narco    Diazepam Rash    Hydrocodone-Guaifenesin Anaphylaxis    Iodinated Contrast Media Anaphylaxis    Iodine - Food Allergy Anaphylaxis    Latex Anaphylaxis    Other Anaphylaxis     Narcotics.the patient states, \"I can only take Dilaudid and Toradol\"    Oxycodone Anaphylaxis    Oxycodone Hcl Anaphylaxis    Oxycodone-Acetaminophen Anaphylaxis    Penicillins Anaphylaxis     Anaphylaxis    Percocet [Oxycodone-Acetaminophen] Anaphylaxis    Robaxin [Methocarbamol] Other (See Comments)     Stroke symptoms    Shellfish Allergy - Food Allergy Anaphylaxis    Shellfish-Derived Products - Food Allergy Anaphylaxis     Anaphylaxis    Clindamycin Rash    Hydrocodone-Acetaminophen Other (See Comments)    Sertraline Other (See Comments)     Drooling, disorientation     "

## 2025-06-11 NOTE — DISCHARGE INSTR - LAB

## 2025-06-12 ENCOUNTER — TELEPHONE (OUTPATIENT)
Age: 50
End: 2025-06-12

## 2025-06-12 NOTE — TELEPHONE ENCOUNTER
Caller: Patient    Doctor: Dr. Baptiste    Reason for call:     Patient recently had an right hip injection and she finally has had a relief from the pain.  She would like  to speak to a nurse about this and how long would this injection last.  She wants to speak to Dr Baptiste office.  Please call and advise.    Call back#: [phone]

## 2025-06-12 NOTE — TELEPHONE ENCOUNTER
Spoke to patient. She is doing quite well at this point and her pain is well controlled. I advised it is quite variable how long these injections last. She does have a FU on 7/7 which she would like to keep in case her pain returns. She will continue to monitor her symptoms for now.

## 2025-06-12 NOTE — TELEPHONE ENCOUNTER
Caller: Chastity KAM    Doctor: Dr. Morales     Reason for call: The pt will upload the pain diary into My Chart     Call back#: [phone]

## 2025-06-16 ENCOUNTER — TELEPHONE (OUTPATIENT)
Dept: PAIN MEDICINE | Facility: CLINIC | Age: 50
End: 2025-06-16

## 2025-06-16 NOTE — TELEPHONE ENCOUNTER
The prior authorization request for this study has not been approved. You can schedule a peer to peer by calling [employer] [phone] Case# [id number] with the deadline date of Scheduled for tomorrow 6/17 @ 8:30am. The insurance determined the following:    [ ] Does not meet Medical Necessity  [ ] No prior imaging  [ x] PT or conservative treatment incomplete  [ ] Missing Labs  [ ] Frequency    Imaging requires six weeks of provider directed treatment to be completed. This must  have been completed in the past three months without improved symptoms. Contact (via  office visit, phone, email, or messaging) must occur after the treatment is completed. This  has not been met because:    The notes sent to us do not show you have completed a full six weeks of this type of  treatment.  Your treatment did not occur within the last 12 weeks.  Symptoms must be the same or worse after treatment to support imaging.  The notes sent to us do not show any contact with your provider after you completed your  treatment. This contact is needed to plan your future care.    If you choose not to complete a peer to peer then please reply to this message to let us know. Please notify your patient and contact central scheduling by calling [phone] to cancel the appointment and cancel the order in Epic.

## 2025-06-16 NOTE — TELEPHONE ENCOUNTER
Advised pt to cancel tomorrow MRI at this time until I am able to speak to Dr Alonso  Verbalized understanding

## 2025-06-17 ENCOUNTER — TELEPHONE (OUTPATIENT)
Age: 50
End: 2025-06-17

## 2025-06-17 NOTE — TELEPHONE ENCOUNTER
"Called and spoke w/pt and she is c/o \"extreme pain\" right hip after having injection by Dr Malik, Pain Management. Reviewed MYCHART msg and response from WALLY Lopez PA-C w/pt. She states she only got a day and a half of relief. WALLY Tabor had written for pt to see how she does over next 2 weeks until appt w/Dr Baptiste on 7/7/25 when they can discuss further.  Pt states there is no redness to R hip and there is warmth and swelling that she always has due to her CRPS, Complex Regional Pain Syndrome. She is taking gabapentin. Tylenol upsets her stomach. Sheis taking Ibuprofen 200mg 2 tabs twice a day and it helps some. Advised, if allowed can take every 6 hours. She is resting and elevating, Cannot use ice as it makes her CRPS worse. Does warm baths and uses heat to hip. She has not been sleeping well due to pain. She would like to know what else she can do.  She is now using a cane to ambulate due to pain. No fever. Advised if symptoms worsen fever, redness to hip, increase pain call back, if severe pain and unable to walk then go to ED. Pt states she understands. Please review and advise.   "

## 2025-06-17 NOTE — TELEPHONE ENCOUNTER
Caller: Chastity    Doctor: Dr. Baptiste    Reason for call: Dr Baptiste sent patient to Dr Malik for injection. Had the injection done on 6/12 and is scheduled for follow up with Emile in 7/7.  Patients states she is so much pain she cannot even walk from her room to the bathroom.  Purchased a cane and was wondering what more she can do for relief until the visit in July     Call back#: [phone]   I have reviewed and confirmed nurses' notes for patient's medications, allergies, medical history, and surgical history.

## 2025-06-17 NOTE — TELEPHONE ENCOUNTER
Erica please let her know that there is nothing else that we can recommend for her in regards to the pain.  If she is having extreme pain and cannot wait to be seen in emergency room would be appropriate for updated imaging evaluation.  She can try to talk to manages her CRPS to see if they have any other recommendations.  Ally please give her a sooner appointment if available for reevaluation.

## 2025-06-17 NOTE — TELEPHONE ENCOUNTER
Called and spoke w/pt and relayed WALLY Lopez's msg. Did not see any earlier available appts at this time. Pt can CB if needed and see if any cancellations.

## 2025-06-18 ENCOUNTER — OFFICE VISIT (OUTPATIENT)
Dept: FAMILY MEDICINE CLINIC | Facility: HOME HEALTHCARE | Age: 50
End: 2025-06-18
Payer: COMMERCIAL

## 2025-06-18 VITALS
RESPIRATION RATE: 18 BRPM | WEIGHT: 182.6 LBS | OXYGEN SATURATION: 97 % | SYSTOLIC BLOOD PRESSURE: 110 MMHG | HEART RATE: 88 BPM | TEMPERATURE: 98.6 F | DIASTOLIC BLOOD PRESSURE: 80 MMHG | BODY MASS INDEX: 29.92 KG/M2

## 2025-06-18 DIAGNOSIS — N89.8 VAGINAL DRYNESS: Primary | ICD-10-CM

## 2025-06-18 DIAGNOSIS — K21.00 GASTROESOPHAGEAL REFLUX DISEASE WITH ESOPHAGITIS WITHOUT HEMORRHAGE: ICD-10-CM

## 2025-06-18 PROCEDURE — 99213 OFFICE O/P EST LOW 20 MIN: CPT

## 2025-06-18 RX ORDER — ESTRADIOL 0.1 MG/G
1 CREAM VAGINAL DAILY
Qty: 42.5 G | Refills: 0 | Status: SHIPPED | OUTPATIENT
Start: 2025-06-18

## 2025-06-18 RX ORDER — OMEPRAZOLE 20 MG/1
20 CAPSULE, DELAYED RELEASE ORAL DAILY
Qty: 30 CAPSULE | Refills: 0 | Status: SHIPPED | OUTPATIENT
Start: 2025-06-18 | End: 2025-07-18

## 2025-06-18 RX ORDER — ESTRADIOL 1 MG/1
1 TABLET ORAL DAILY
Qty: 30 TABLET | Refills: 0 | Status: SHIPPED | OUTPATIENT
Start: 2025-06-18 | End: 2025-06-18 | Stop reason: CLARIF

## 2025-06-18 NOTE — ASSESSMENT & PLAN NOTE
Tried estrace 2g daily since 06/04 and dryness improved.   Denied vaginal pain, bleeding.     - lower to 1g daily, f/u in 1-2 months  Orders:    estradiol (ESTRACE VAGINAL) 0.1 mg/g vaginal cream; Insert 1 g into the vagina daily

## 2025-06-18 NOTE — PROGRESS NOTES
Name: Chastity Shaw      : 1975      MRN: [id number]  Encounter Provider: Danika Moralez DO  Encounter Date: 2025   Encounter department: [facility]  :  Assessment & Plan  Vaginal dryness  Tried estrace 2g daily since  and dryness improved.   Denied vaginal pain, bleeding.     - lower to 1g daily, f/u in 1-2 months  Orders:    estradiol (ESTRACE VAGINAL) 0.1 mg/g vaginal cream; Insert 1 g into the vagina daily    Gastroesophageal reflux disease with esophagitis without hemorrhage  Patient still has intermittent mid-epigastric pain. Already avoiding caffeine, citric food.   Patient feels more irritated when walking.   Tried pepcid 40mg daily with no improvement    - trial omeprazole 20mg daily, inform to stop pepcid  - f/u in 1-2 months  Orders:    omeprazole (PriLOSEC) 20 mg delayed release capsule; Take 1 capsule (20 mg total) by mouth daily           History of Present Illness   HPI  Chastity Shaw is a 51 yo female with hx of anxiety, chronic pain syndrome (CRPS), R OA hip pain, GERD, lumbar radiculopathy, mild intermittent asthma, vit D deficiency, hx of endometriosis s/p total laparoscopic hysterectomy, b/l salpingo-oophorectomy and cystoscopy on 2021 presents for follow up. Patient reported her vaginal dryness improved with estrace 2g cream daily. She denied vaginal pain or bleeding. Patient also has been taking pepcid 40mg daily but mid-epigastric pain still persist and more pain with walking. Patient has been cautious with food choice, avoid caffeine and citric food. Her eczema rash also improved after applying mometasone cream. Patient recently had ICS injection to her R hip and reported the pain was relief for only a day and half. She is planning to discuss other treatment options on 2025 ortho appointment. Denied CP, SOB.     Review of Systems   Constitutional:  Negative for chills and fever.   HENT:  Negative for congestion and sore  throat.    Eyes:  Negative for visual disturbance.   Respiratory:  Negative for cough and shortness of breath.    Cardiovascular:  Negative for chest pain and palpitations.   Gastrointestinal:  Negative for abdominal pain, diarrhea, nausea and vomiting.   Genitourinary:  Negative for dysuria, hematuria, vaginal bleeding, vaginal discharge and vaginal pain.   Musculoskeletal:  Positive for joint swelling. Negative for back pain.        Chronic R hip pain s/p ICS injection, mild swelling around R hip compared to L hip   Skin:  Negative for color change and rash.   Neurological:  Negative for headaches.   All other systems reviewed and are negative.      Objective   /80 (BP Location: Left arm)   Pulse 88   Temp 98.6 °F (37 °C)   Resp 18   Wt 82.8 kg (182 lb 9.6 oz)   LMP 03/02/2020 (Approximate)   SpO2 97%   BMI 29.92 kg/m²      Physical Exam  Vitals and nursing note reviewed.   Constitutional:       General: She is not in acute distress.     Appearance: Normal appearance. She is well-developed. She is not ill-appearing.   HENT:      Head: Normocephalic and atraumatic.      Right Ear: External ear normal.      Left Ear: External ear normal.      Nose: Nose normal. No congestion.      Mouth/Throat:      Mouth: Mucous membranes are moist.      Pharynx: Oropharynx is clear. No oropharyngeal exudate or posterior oropharyngeal erythema.     Eyes:      General:         Right eye: No discharge.         Left eye: No discharge.      Extraocular Movements: Extraocular movements intact.      Conjunctiva/sclera: Conjunctivae normal.       Cardiovascular:      Rate and Rhythm: Normal rate and regular rhythm.      Pulses: Normal pulses.      Heart sounds: Normal heart sounds. No murmur heard.  Pulmonary:      Effort: Pulmonary effort is normal. No respiratory distress.      Breath sounds: Normal breath sounds. No wheezing.   Abdominal:      General: Abdomen is flat. Bowel sounds are normal. There is no distension.       Palpations: Abdomen is soft.      Tenderness: There is no abdominal tenderness. There is no guarding.     Musculoskeletal:         General: Tenderness present. No swelling. Normal range of motion.      Cervical back: Normal range of motion and neck supple.      Right lower leg: No edema.      Left lower leg: No edema.      Comments: Chronic R hip pain     Skin:     General: Skin is warm and dry.      Capillary Refill: Capillary refill takes less than 2 seconds.      Findings: No erythema, lesion or rash.     Neurological:      General: No focal deficit present.      Mental Status: She is alert and oriented to person, place, and time.     Psychiatric:         Mood and Affect: Mood normal.       Administrative Statements   I have spent a total time of 20 minutes in caring for this patient on the day of the visit/encounter including Impressions, Counseling / Coordination of care, Documenting in the medical record, Reviewing/placing orders in the medical record (including tests, medications, and/or procedures), and Obtaining or reviewing history  .

## 2025-06-18 NOTE — ASSESSMENT & PLAN NOTE
Patient still has intermittent mid-epigastric pain. Already avoiding caffeine, citric food.   Patient feels more irritated when walking.   Tried pepcid 40mg daily with no improvement    - trial omeprazole 20mg daily, inform to stop pepcid  - f/u in 1-2 months  Orders:    omeprazole (PriLOSEC) 20 mg delayed release capsule; Take 1 capsule (20 mg total) by mouth daily

## 2025-06-19 NOTE — TELEPHONE ENCOUNTER
Spoke to pt, they are going to have the psych eval w [facility], emailed referral to their office. Sent MCM to pt w [facility] phone, advised if they do not hear from pt in next day or so to please call them.    Pt takes aspirin 81mg, prescribed by Dr. Moralez, PCP, sent staff message w aspirin hold request.    Email sent to Noam for education.

## 2025-06-25 ENCOUNTER — TELEPHONE (OUTPATIENT)
Dept: PAIN MEDICINE | Facility: CLINIC | Age: 50
End: 2025-06-25

## 2025-06-26 ENCOUNTER — HOSPITAL ENCOUNTER (OUTPATIENT)
Dept: MRI IMAGING | Facility: HOSPITAL | Age: 50
Discharge: HOME | End: 2025-06-26
Attending: ANESTHESIOLOGY
Payer: COMMERCIAL

## 2025-06-26 DIAGNOSIS — M54.16 LUMBAR RADICULOPATHY: ICD-10-CM

## 2025-06-26 DIAGNOSIS — G90.521 COMPLEX REGIONAL PAIN SYNDROME TYPE 1 OF RIGHT LOWER EXTREMITY: ICD-10-CM

## 2025-06-26 DIAGNOSIS — M54.9 MID BACK PAIN: ICD-10-CM

## 2025-06-26 DIAGNOSIS — G90.50 RSD (REFLEX SYMPATHETIC DYSTROPHY): ICD-10-CM

## 2025-06-26 PROCEDURE — 72146 MRI CHEST SPINE W/O DYE: CPT

## 2025-07-01 ENCOUNTER — TELEPHONE (OUTPATIENT)
Dept: FAMILY MEDICINE CLINIC | Facility: HOME HEALTHCARE | Age: 50
End: 2025-07-01

## 2025-07-01 NOTE — TELEPHONE ENCOUNTER
After she sets up apt for hip surgery  call back and  might be able to set up for aide.. but has to wait til after confirmed

## 2025-07-03 ENCOUNTER — TELEPHONE (OUTPATIENT)
Age: 50
End: 2025-07-03

## 2025-07-03 DIAGNOSIS — G89.4 CHRONIC PAIN SYNDROME: ICD-10-CM

## 2025-07-03 DIAGNOSIS — G90.511 COMPLEX REGIONAL PAIN SYNDROME TYPE 1 OF RIGHT UPPER EXTREMITY: ICD-10-CM

## 2025-07-03 DIAGNOSIS — M54.16 LUMBAR RADICULOPATHY: ICD-10-CM

## 2025-07-03 DIAGNOSIS — G90.511 COMPLEX REGIONAL PAIN SYNDROME TYPE 1 OF RIGHT UPPER EXTREMITY: Primary | ICD-10-CM

## 2025-07-03 RX ORDER — GABAPENTIN 600 MG/1
1200 TABLET ORAL 3 TIMES DAILY
Qty: 180 TABLET | Refills: 0 | Status: SHIPPED | OUTPATIENT
Start: 2025-07-03 | End: 2025-08-02

## 2025-07-03 RX ORDER — GABAPENTIN 600 MG/1
1200 TABLET ORAL 3 TIMES DAILY
Qty: 180 TABLET | Refills: 0 | Status: SHIPPED | OUTPATIENT
Start: 2025-07-03 | End: 2025-07-03 | Stop reason: SDUPTHER

## 2025-07-03 NOTE — TELEPHONE ENCOUNTER
Pt contacted Call Center requested refill of their medication.        Doctor Name: Dr. Alonso       Medication Name:   gabapentin (NEURONTIN)      Dosage of Med: 600 mg       Frequency of Med: 2 tablets in the morning and 2 tablets in the evening and 2 tablets before bedtime       Remaining Medication: Few left       Pharmacy and Location:   [address] [facility] [place]           Pt. Preferred Callback Phone Number: [phone]      Thank you.

## 2025-07-03 NOTE — TELEPHONE ENCOUNTER
S/w pt who is rqst rf of Trae 600 mg x2 in AM, 2 in evening and 2 @ hs. Pt states she was prev prescr this med by [facility] PM Dr. Garcia. Pt states it is somewhat helpful and has been on med for approx 12 years (per pt).  Are you able to take over med and send rf to [facility] in [place]?  Pls advise.  Thank you!    LOV 6/6/25 RM

## 2025-07-07 ENCOUNTER — TELEPHONE (OUTPATIENT)
Dept: FAMILY MEDICINE CLINIC | Facility: HOME HEALTHCARE | Age: 50
End: 2025-07-07

## 2025-07-07 ENCOUNTER — OFFICE VISIT (OUTPATIENT)
Dept: OBGYN CLINIC | Facility: MEDICAL CENTER | Age: 50
End: 2025-07-07
Payer: COMMERCIAL

## 2025-07-07 ENCOUNTER — TELEPHONE (OUTPATIENT)
Age: 50
End: 2025-07-07

## 2025-07-07 ENCOUNTER — TELEPHONE (OUTPATIENT)
Dept: FAMILY MEDICINE CLINIC | Facility: CLINIC | Age: 50
End: 2025-07-07

## 2025-07-07 VITALS — HEIGHT: 66 IN | WEIGHT: 182 LBS | BODY MASS INDEX: 29.25 KG/M2

## 2025-07-07 DIAGNOSIS — Z01.812 PRE-OPERATIVE LABORATORY EXAMINATION: ICD-10-CM

## 2025-07-07 DIAGNOSIS — J06.9 VIRAL URI WITH COUGH: ICD-10-CM

## 2025-07-07 DIAGNOSIS — F41.9 ANXIETY: ICD-10-CM

## 2025-07-07 DIAGNOSIS — R21 RASH: ICD-10-CM

## 2025-07-07 DIAGNOSIS — K21.00 GASTROESOPHAGEAL REFLUX DISEASE WITH ESOPHAGITIS WITHOUT HEMORRHAGE: ICD-10-CM

## 2025-07-07 DIAGNOSIS — N89.8 VAGINAL DRYNESS: ICD-10-CM

## 2025-07-07 DIAGNOSIS — M16.11 PRIMARY OSTEOARTHRITIS OF RIGHT HIP: Primary | ICD-10-CM

## 2025-07-07 PROCEDURE — 99214 OFFICE O/P EST MOD 30 MIN: CPT | Performed by: ORTHOPAEDIC SURGERY

## 2025-07-07 RX ORDER — MUPIROCIN 2 %
OINTMENT (GRAM) TOPICAL 2 TIMES DAILY
Qty: 15 G | Refills: 0 | Status: SHIPPED | OUTPATIENT
Start: 2025-07-07 | End: 2025-07-08 | Stop reason: SDUPTHER

## 2025-07-07 RX ORDER — FOLIC ACID 1 MG/1
1 TABLET ORAL DAILY
Qty: 30 TABLET | Refills: 0 | Status: SHIPPED | OUTPATIENT
Start: 2025-07-07 | End: 2025-07-08 | Stop reason: SDUPTHER

## 2025-07-07 RX ORDER — CHLORHEXIDINE GLUCONATE ORAL RINSE 1.2 MG/ML
15 SOLUTION DENTAL ONCE
OUTPATIENT
Start: 2025-07-07 | End: 2025-07-07

## 2025-07-07 RX ORDER — FERROUS SULFATE 324(65)MG
TABLET, DELAYED RELEASE (ENTERIC COATED) ORAL
Qty: 30 TABLET | Refills: 0 | Status: SHIPPED | OUTPATIENT
Start: 2025-07-07 | End: 2025-07-08 | Stop reason: SDUPTHER

## 2025-07-07 RX ORDER — SODIUM CHLORIDE, SODIUM LACTATE, POTASSIUM CHLORIDE, CALCIUM CHLORIDE 600; 310; 30; 20 MG/100ML; MG/100ML; MG/100ML; MG/100ML
20 INJECTION, SOLUTION INTRAVENOUS CONTINUOUS
OUTPATIENT
Start: 2025-07-07

## 2025-07-07 RX ORDER — ASCORBIC ACID 500 MG
500 TABLET ORAL 2 TIMES DAILY
Qty: 60 TABLET | Refills: 0 | Status: SHIPPED | OUTPATIENT
Start: 2025-07-07 | End: 2025-07-08 | Stop reason: SDUPTHER

## 2025-07-07 NOTE — PROGRESS NOTES
Orthopaedic Surgery - Office Note  Chastity Shaw (50 y.o. female)   : 1975   MRN: [id number]  Encounter Date: 2025    Assessment / Plan  Assessment & Plan  Primary osteoarthritis of right hip  Will schedule Right WILLARD, 2025 [facility]  Orders:    Comprehensive metabolic panel; Future    Hemoglobin A1C W/EAG Estimation; Future    CBC and differential; Future    MRSA culture; Future    Anemia Panel w/Reflex; Future    Protime-INR; Future    APTT; Future    Ambulatory Referral to Center for Perioperative Medicine; Future    Ambulatory referral to Physical Therapy; Future    Case request operating room: ARTHROPLASTY HIP TOTAL; Standing    Pre-operative laboratory examination    Orders:    Comprehensive metabolic panel; Future    Hemoglobin A1C W/EAG Estimation; Future    CBC and differential; Future    MRSA culture; Future    Anemia Panel w/Reflex; Future    Protime-INR; Future    APTT; Future          The diagnosis and treatment options were reviewed.  The patient wishes to proceed with Right WILLARD.  The risks, benefits, and alternatives were discussed.  Written consent was obtained.  Preoperative medical clearance will be obtained.  Will schedule Right WILLARD, posterior approach for 2025  Follow-up:  Return for post-op with x-rays upon arrival right hip and pelvis.      Chief Complaint / Date of Onset  Right Hip pain  Injury Mechanism / Date  None  Surgery / Date  None    History of Present Illness   Chastity Shaw is a 50 y.o. female who presents for repeat evaluation of her right hip due to persistent pain. She has mild-modest OA with a degenerative labral tear with cyst. At her previous visit an arthroscopy vs WILLARD discussed. Since her last office visit she had a right hip image guided injection of cortisone with great improvement of her symptoms however only lasted 1.5 days. She was able to walk and get around much better following her image guided injection.  She is on disability,  "CRPS. Takes 1200mg Neurontin TID, 50mg tramadol Q6hrs. Presents today using a SPC.   Previously had medtronic spinal cord stimulator but is awaiting a nevros SPC. Sees Dr. Malik.     Treatment Summary  Medications / Modalities  1200mg neurontin TID, 50mg tramadol Q6hrs  Bracing / Immobilization  None  Physical Therapy  Had PT previously, maintains HEP  Injections  Right hip image guided injection, 6/11/25  Prior Surgeries  None  Other Treatments  Activity modification. Cane.    Employment / Current Status  Unemployed, CRPS    Sport / Organization / Current Status  N/A      Review of Systems  Pertinent items are noted in HPI.  All other systems were reviewed and are negative.      Physical Exam  Ht 5' 5.5\" (1.664 m)   Wt 82.6 kg (182 lb)   LMP 03/02/2020 (Approximate)   BMI 29.83 kg/m²   Cons: Appears well.  No apparent distress.  Psych: Alert. Oriented x3.  Mood and affect normal.  Eyes: PERRLA, EOMI  Resp: Normal effort.  No audible wheezing or stridor.  CV: Palpable pulse.  No discernable arrhythmia.  No LE edema.  Lymph:  No palpable cervical, axillary, or inguinal lymphadenopathy.  Skin: Warm.  No palpable masses.  No visible lesions.  Neuro: Normal muscle tone.  Normal and symmetric DTR's.     Right Hip Exam  Alignment / Posture:  Normal lumbar alignment.  Inspection:  No swelling. No edema. No erythema. No ecchymosis. No muscle atrophy.  Palpation:  No tenderness. No warmth. Mild clicking with passive hip flexion with IR/ER.  ROM:  Hip Flexion 80, leg goes into ER. Hip Abduction 20, limited by pain. Hip ER 20 with pain and guarding. Hip IR 10 with pain and guarding.  Strength:  5/5 hip flexors and abductors.  Stability:  No objective hip instability.  Tests:  No pertinent positive or negative tests.  Neurovascular:  Sensation intact L1-S1 BLE. Toes warm and perfused.  Gait:  mildly antalgic with SPC        Studies Reviewed  Previously taken XRs and MRI were re-reviewed       Procedures  No procedures " today.    Medical, Surgical, Family, and Social History  The patient's medical history, family history, and social history, were reviewed and updated as appropriate.    Past Medical History[1]    Past Surgical History[2]    Family History[3]    Social History     Occupational History    Not on file   Tobacco Use    Smoking status: Former     Current packs/day: 0.00     Average packs/day: 0.5 packs/day for 10.0 years (5.0 ttl pk-yrs)     Types: Cigarettes     Start date: 6/22/2005     Quit date: 6/22/2010     Years since quitting: 15.0     Passive exposure: Current    Smokeless tobacco: Former   Vaping Use    Vaping status: Never Used   Substance and Sexual Activity    Alcohol use: Yes     Comment: Holidays    Drug use: No    Sexual activity: Yes     Partners: Male     Birth control/protection: Condom Male       Allergies[4]    Current Medications[5]      Panda Garcia    Scribe Attestation      I,:  Panda Garcia am acting as a scribe while in the presence of the attending physician.:       I,:  Fish Baptiste MD personally performed the services described in this documentation    as scribed in my presence.:                  [1]   Past Medical History:  Diagnosis Date    Anxiety     Asthma     Back pain     Chronic narcotic dependence (HCC)     CRPS (complex regional pain syndrome type I)     CRPS (complex regional pain syndrome type I)     RLE and RUE    Current mild episode of major depressive disorder without prior episode (HCC) 10/15/2018    Depression     Fracture     2015 right knee fracture    Irregular periods 06/12/2019    Latex allergy     Limb alert care status     RUE, RLE    Mobility impaired     uses cane    Optic neuritis, left     PONV (postoperative nausea and vomiting)     RSD (reflex sympathetic dystrophy)     CRPS RUE/RLE    Wears glasses    [2]   Past Surgical History:  Procedure Laterality Date    BREAST LUMPECTOMY      Left    HYSTERECTOMY Bilateral 08/24/2021    IMPLANTATION / PLACEMENT  EPIDURAL NEUROSTIMULATOR ELECTRODES Right     KNEE ARTHROSCOPY Right     CT INSJ/RPLCMT SPINAL NPG/RCVR POCKET CRTJ&CONNJ Right 07/18/2017    Procedure: PLACEMENT OF NEW SPINAL COLUMN STIMULATOR IN THE RIGHT BUTTOCK THROUGH SEPERATE INCISION WITH TUNNELING OF LEAD; POSSIBLE PLACEMENT OF EXTENSION;  Surgeon: Ángel Mercado MD;  Location: [facility];  Service: Neurosurgery    CT INSJ/RPLCMT SPINAL NPG/RCVR POCKET CRTJ&CONNJ N/A 04/25/2018    Procedure: Removal of left flank spinal cord stimulator generator, placement of new spinal cord stimulator generator in left buttock through separate incision;  Surgeon: Ángel Mercado MD;  Location: [facility];  Service: Neurosurgery    CT GLORIA IMPLTJ NSTIM ELTRDS PLATE/PADDLE EDRL N/A 07/14/2016    Procedure: LAMINECTOMY  T10 -11 WITH  PLACEMENT OF  SPINAL CORD STIMULATOR AND PULSE GEBNERATOR  IMPULSE MONITORING ;  Surgeon: Nicole Wylie MD;  Location: [facility];  Service: Orthopedics    CT REVJ/RMVL IMPL SPI NPG/RCVR DTCH CONNJ ELTRD RA Right 07/18/2017    Procedure: REMOVAL RIGHT FLANK SPINAL COLUMN STIMULATOR IPG;  Surgeon: Ángel Mercado MD;  Location: [facility];  Service: Neurosurgery    CT REVJ/RMVL IMPL SPI NPG/RCVR DTCH CONNJ ELTRD RA Bilateral 03/01/2019    Procedure: REMOVAL OF IMPLANTABLE PULSE GENERATOR (IPG) DORSAL SPINAL COLUMN STIMULATOR  (DCS) AND GENERATOR OF CERVICAL AND THORACIC (NEUROMONITORING);  Surgeon: Maxi Hector MD;  Location: [facility];  Service: Neurosurgery    TUBAL LIGATION Bilateral 2003    WISDOM TOOTH EXTRACTION     [3]   Family History  Adopted: Yes   Problem Relation Name Age of Onset    Breast cancer Mother Karen Amaya     Cancer Mother Karen Amaya    [4]   Allergies  Allergen Reactions    Bee Venom Anaphylaxis    Codeine Anaphylaxis     Reaction Date: 26Apr2011;   Possible other narcotics; pt may take Narco    Diazepam Rash    Hydrocodone-Guaifenesin Anaphylaxis    Iodinated Contrast Media Anaphylaxis    Iodine - Food Allergy  "Anaphylaxis    Latex Anaphylaxis    Other Anaphylaxis     Narcotics.the patient states, \"I can only take Dilaudid and Toradol\"    Oxycodone Anaphylaxis    Oxycodone Hcl Anaphylaxis    Oxycodone-Acetaminophen Anaphylaxis    Penicillins Anaphylaxis     Anaphylaxis    Percocet [Oxycodone-Acetaminophen] Anaphylaxis    Robaxin [Methocarbamol] Other (See Comments)     Stroke symptoms    Shellfish Allergy - Food Allergy Anaphylaxis    Shellfish-Derived Products - Food Allergy Anaphylaxis     Anaphylaxis    Clindamycin Rash    Hydrocodone-Acetaminophen Other (See Comments)    Sertraline Other (See Comments)     Drooling, disorientation   [5]   Current Outpatient Medications:     albuterol (PROVENTIL HFA,VENTOLIN HFA) 90 mcg/act inhaler, Inhale 2 puffs as needed for wheezing, Disp: 1 each, Rfl: 0    aspirin (ECOTRIN LOW STRENGTH) 81 mg EC tablet, Take 81 mg by mouth in the morning., Disp: , Rfl:     estradiol (ESTRACE VAGINAL) 0.1 mg/g vaginal cream, Insert 1 g into the vagina daily, Disp: 42.5 g, Rfl: 0    Fluticasone-Salmeterol (Advair) 250-50 mcg/dose inhaler, , Disp: , Rfl:     gabapentin (NEURONTIN) 600 MG tablet, Take 2 tablets (1,200 mg total) by mouth in the morning and 2 tablets (1,200 mg total) in the evening and 2 tablets (1,200 mg total) before bedtime., Disp: 180 tablet, Rfl: 0    ketorolac (TORADOL) 10 mg tablet, Take 10 mg by mouth every 6 (six) hours as needed, Disp: , Rfl:     LORazepam (ATIVAN) 0.5 mg tablet, Take 1 tablet (0.5 mg total) by mouth every 6 (six) hours as needed for anxiety, Disp: 30 tablet, Rfl: 0    mometasone (ELOCON) 0.1 % lotion, Apply topically daily, Disp: 30 mL, Rfl: 0    traMADol (ULTRAM) 50 mg tablet, Take 50 mg by mouth every 6 (six) hours as needed for severe pain, Disp: , Rfl:     EPINEPHrine (EpiPen 2-Cas) 0.3 mg/0.3 mL SOAJ, Inject 0.3 mL (0.3 mg total) into a muscle once for 1 dose, Disp: 0.3 mL, Rfl: 2    omeprazole (PriLOSEC) 20 mg delayed release capsule, Take 1 capsule (20 " mg total) by mouth daily (Patient not taking: Reported on 7/7/2025), Disp: 30 capsule, Rfl: 0

## 2025-07-07 NOTE — TELEPHONE ENCOUNTER
Received call from patient requesting refills for medications. Preferred pharmacy updated to [facility] in [place].

## 2025-07-07 NOTE — TELEPHONE ENCOUNTER
Caller: [facility]    Doctor: Dr. Baptiste    Reason for call: pharmacy called about the tramadol prescription that patient was prescribed. I advised that was not ordered by our provider    Call back#: n.a

## 2025-07-07 NOTE — ASSESSMENT & PLAN NOTE
Will schedule Right WILLARD, 9/18/2025 [facility]  Orders:    Comprehensive metabolic panel; Future    Hemoglobin A1C W/EAG Estimation; Future    CBC and differential; Future    MRSA culture; Future    Anemia Panel w/Reflex; Future    Protime-INR; Future    APTT; Future    Ambulatory Referral to Center for Perioperative Medicine; Future    Ambulatory referral to Physical Therapy; Future    Case request operating room: ARTHROPLASTY HIP TOTAL; Standing

## 2025-07-07 NOTE — TELEPHONE ENCOUNTER
Patient called asking if she can get a shower chair and shower grab bars ordered as she will having her right hip replacement next month.

## 2025-07-08 ENCOUNTER — TELEPHONE (OUTPATIENT)
Age: 50
End: 2025-07-08

## 2025-07-08 DIAGNOSIS — R21 RASH: ICD-10-CM

## 2025-07-08 DIAGNOSIS — F41.9 ANXIETY: ICD-10-CM

## 2025-07-08 DIAGNOSIS — K21.00 GASTROESOPHAGEAL REFLUX DISEASE WITH ESOPHAGITIS WITHOUT HEMORRHAGE: ICD-10-CM

## 2025-07-08 DIAGNOSIS — N89.8 VAGINAL DRYNESS: ICD-10-CM

## 2025-07-08 DIAGNOSIS — J06.9 VIRAL URI WITH COUGH: ICD-10-CM

## 2025-07-08 DIAGNOSIS — M16.11 PRIMARY OSTEOARTHRITIS OF RIGHT HIP: ICD-10-CM

## 2025-07-08 LAB
DME PARACHUTE DELIVERY DATE REQUESTED: NORMAL
DME PARACHUTE ITEM DESCRIPTION: NORMAL
DME PARACHUTE ITEM DESCRIPTION: NORMAL
DME PARACHUTE ORDER STATUS: NORMAL
DME PARACHUTE SUPPLIER NAME: NORMAL
DME PARACHUTE SUPPLIER PHONE: NORMAL

## 2025-07-08 RX ORDER — LORAZEPAM 0.5 MG/1
0.5 TABLET ORAL EVERY 6 HOURS PRN
Qty: 30 TABLET | Refills: 0 | Status: CANCELLED | OUTPATIENT
Start: 2025-07-08

## 2025-07-08 RX ORDER — ESTRADIOL 0.1 MG/G
2 CREAM VAGINAL DAILY
Qty: 42.5 G | Refills: 0 | Status: SHIPPED | OUTPATIENT
Start: 2025-07-08

## 2025-07-08 RX ORDER — OMEPRAZOLE 20 MG/1
20 CAPSULE, DELAYED RELEASE ORAL DAILY
Qty: 30 CAPSULE | Refills: 0 | Status: CANCELLED | OUTPATIENT
Start: 2025-07-08 | End: 2025-08-07

## 2025-07-08 RX ORDER — ALBUTEROL SULFATE 90 UG/1
2 INHALANT RESPIRATORY (INHALATION) AS NEEDED
Qty: 6.7 G | Refills: 0 | Status: CANCELLED | OUTPATIENT
Start: 2025-07-08

## 2025-07-08 RX ORDER — FOLIC ACID 1 MG/1
1 TABLET ORAL DAILY
Qty: 30 TABLET | Refills: 0 | Status: SHIPPED | OUTPATIENT
Start: 2025-07-08

## 2025-07-08 RX ORDER — ASCORBIC ACID 500 MG
500 TABLET ORAL 2 TIMES DAILY
Qty: 60 TABLET | Refills: 0 | Status: SHIPPED | OUTPATIENT
Start: 2025-07-08

## 2025-07-08 RX ORDER — MULTIVITAMIN
1 TABLET ORAL DAILY
Qty: 30 TABLET | Refills: 0 | Status: SHIPPED | OUTPATIENT
Start: 2025-07-08 | End: 2025-08-07

## 2025-07-08 RX ORDER — ALBUTEROL SULFATE 90 UG/1
2 INHALANT RESPIRATORY (INHALATION) AS NEEDED
Qty: 6.7 G | Refills: 0 | Status: SHIPPED | OUTPATIENT
Start: 2025-07-08

## 2025-07-08 RX ORDER — MOMETASONE FUROATE 1 MG/ML
LOTION TOPICAL DAILY
Qty: 30 ML | Refills: 0 | Status: SHIPPED | OUTPATIENT
Start: 2025-07-08

## 2025-07-08 RX ORDER — MUPIROCIN 2 %
OINTMENT (GRAM) TOPICAL 2 TIMES DAILY
Qty: 15 G | Refills: 0 | Status: SHIPPED | OUTPATIENT
Start: 2025-07-08

## 2025-07-08 RX ORDER — FERROUS SULFATE 324(65)MG
TABLET, DELAYED RELEASE (ENTERIC COATED) ORAL
Qty: 30 TABLET | Refills: 0 | Status: SHIPPED | OUTPATIENT
Start: 2025-07-08

## 2025-07-08 RX ORDER — MOMETASONE FUROATE 1 MG/ML
LOTION TOPICAL DAILY
Qty: 30 ML | Refills: 0 | Status: CANCELLED | OUTPATIENT
Start: 2025-07-08

## 2025-07-08 RX ORDER — OMEPRAZOLE 20 MG/1
20 CAPSULE, DELAYED RELEASE ORAL DAILY
Qty: 30 CAPSULE | Refills: 0 | Status: SHIPPED | OUTPATIENT
Start: 2025-07-08 | End: 2025-08-07

## 2025-07-08 RX ORDER — ESTRADIOL 0.1 MG/G
2 CREAM VAGINAL DAILY
Qty: 42.5 G | Refills: 0 | Status: CANCELLED | OUTPATIENT
Start: 2025-07-08

## 2025-07-08 RX ORDER — TRAMADOL HYDROCHLORIDE 50 MG/1
50 TABLET ORAL EVERY 6 HOURS PRN
Qty: 20 TABLET | Refills: 0 | Status: SHIPPED | OUTPATIENT
Start: 2025-07-08

## 2025-07-08 RX ORDER — LORAZEPAM 0.5 MG/1
0.5 TABLET ORAL EVERY 6 HOURS PRN
Qty: 30 TABLET | Refills: 1 | Status: SHIPPED | OUTPATIENT
Start: 2025-07-08

## 2025-07-08 NOTE — TELEPHONE ENCOUNTER
Please call the patient and make aware that these medications have been sent, along with pain medication script. Thank you Jess

## 2025-07-08 NOTE — TELEPHONE ENCOUNTER
Caller: fred    Doctor: Dr. Baptiste    Reason for call: patient's medication from yesterday was sent to [facility]. [facility] is closing and will not fill the RX. Please send to Pharmacy: [facility] - [place] - [address] [[id number]]     Patient also discussed pain medication with the DR. Nothing was called in. Can something for the pain be called in  Please advise the patient  Thank you  Call back#: [phone]

## 2025-07-10 ENCOUNTER — TELEPHONE (OUTPATIENT)
Dept: OBGYN CLINIC | Facility: HOSPITAL | Age: 50
End: 2025-07-10

## 2025-07-10 NOTE — TELEPHONE ENCOUNTER
Caller: Patient    Doctor: Dr. Baptiste    Reason for call: Patient wants to know when she should start taking vitamins/meds. Patient has surgery on 9/18    Call back#: [phone]

## 2025-07-11 ENCOUNTER — TELEPHONE (OUTPATIENT)
Dept: FAMILY MEDICINE CLINIC | Facility: HOME HEALTHCARE | Age: 50
End: 2025-07-11

## 2025-07-11 DIAGNOSIS — M16.11 PRIMARY OSTEOARTHRITIS OF ONE HIP, RIGHT: Primary | ICD-10-CM

## 2025-07-11 NOTE — TELEPHONE ENCOUNTER
Received message from clinic staff that patient is requesting home aid after surgery due to possible difficulty with ambulation and need for joint restoration. [facility] referral ordered for patient to have the care service after her arthroplasty R hip surgery on 09/18/2025.

## 2025-07-11 NOTE — TELEPHONE ENCOUNTER
Caller: Chastity    Doctor: Dr. Morales    Reason for call: pt is calling stating she called to set up an appt for a psych eval. She was told they are out of network. She is asking for next step. Please advise pt     Call back#: [phone]

## 2025-07-14 NOTE — TELEPHONE ENCOUNTER
Called pt, advised I would send a [facility] w a few more psych eval options- [facility] and Dr. Isaac or [facility] if she wants to travel.

## 2025-07-14 NOTE — TELEPHONE ENCOUNTER
Advised pt as per BK review  Pt is upset now that she can't have her eval until 7/24 and then that is just an initial evaluation with appointment to follow at another date.  PT wants everything taken care of prior to her hip surgery.  I did encourage her to check with her insurance regarding other places she could have her eval.   Pt asking if Ali has any other insite in this matter

## 2025-07-14 NOTE — TELEPHONE ENCOUNTER
It looks like the thoracic MRI was ordered as a preop study for spinal cord stimulator??  There are some degenerative, noncompressive changes seen.  No acute findings.

## 2025-07-14 NOTE — TELEPHONE ENCOUNTER
Spoke to pt, she is going to call [facility] in [place] since [facility] is out of network.    Pt is asking if someone could call her to go over the MRI results?

## 2025-07-15 DIAGNOSIS — G89.4 CHRONIC PAIN SYNDROME: Primary | ICD-10-CM

## 2025-07-16 ENCOUNTER — TELEPHONE (OUTPATIENT)
Dept: RADIOLOGY | Facility: CLINIC | Age: 50
End: 2025-07-16

## 2025-07-16 NOTE — TELEPHONE ENCOUNTER
----- Message from Danika Moralez DO sent at 6/19/2025  2:50 PM EDT -----  Regarding: RE: aspirin hold- SCS trial  Maame Silva,    Yes, patient could hold the aspirin for 6 days prior and 5-7 days during the spinal cord stimulator trial.   When is she scheduled to have the procedure done?    Thank You,  Fouzia Moralez DO (Yi-Ling), Family Medicine PGY-2  ----- Message -----  From: Shira Damon MA  Sent: 6/19/2025  11:59 AM EDT  To: Danika Moralez DO  Subject: aspirin hold- SCS trial                          Hello,  Patient is being considered for a Spinal Cord Stimulator Trial for the management of their pain. However, the patient is currently on an anti-coagulant per your orders. Your permission to discontinue this anti-coagulant is necessary in order to proceed with this procedure. We will instruct the patient to restart their anti-coagulant immediately after their trial lead is removed.     Patient would need to hold ASPIRIN 81 MG FOR 6 DAYS PRIOR AND 5-7 DAYS DURING TRIAL FOR A TOTAL OF 11-13 DAYS.  This was initially prescribed by [facility], but pt said that you would be managing it now bc she no longer sees any [facility] doctors.      Do you give permission for the above patient to discontinue the anti-coagulant for the duration specified above? Please advise.  Thank you.

## 2025-07-22 PROBLEM — R26.2 AMBULATORY DYSFUNCTION: Status: ACTIVE | Noted: 2025-07-22

## 2025-07-24 ENCOUNTER — TELEPHONE (OUTPATIENT)
Dept: FAMILY MEDICINE CLINIC | Facility: CLINIC | Age: 50
End: 2025-07-24

## 2025-07-24 DIAGNOSIS — M16.11 PRIMARY OSTEOARTHRITIS OF ONE HIP, RIGHT: ICD-10-CM

## 2025-07-24 DIAGNOSIS — R26.2 AMBULATORY DYSFUNCTION: Primary | ICD-10-CM

## 2025-07-24 LAB
DME PARACHUTE DELIVERY DATE REQUESTED: NORMAL
DME PARACHUTE ITEM DESCRIPTION: NORMAL
DME PARACHUTE ORDER STATUS: NORMAL
DME PARACHUTE SUPPLIER NAME: NORMAL
DME PARACHUTE SUPPLIER PHONE: NORMAL

## 2025-07-24 NOTE — TELEPHONE ENCOUNTER
Hi patient called back to provide the fax number for physician certification form that needs a non-resident signature. Fax number is [phone] thanks!

## 2025-07-27 LAB
DME PARACHUTE DELIVERY DATE ACTUAL: NORMAL
DME PARACHUTE DELIVERY DATE REQUESTED: NORMAL
DME PARACHUTE ITEM DESCRIPTION: NORMAL
DME PARACHUTE ORDER STATUS: NORMAL
DME PARACHUTE SUPPLIER NAME: NORMAL
DME PARACHUTE SUPPLIER PHONE: NORMAL

## 2025-08-04 DIAGNOSIS — G89.4 CHRONIC PAIN SYNDROME: ICD-10-CM

## 2025-08-04 DIAGNOSIS — R21 RASH: ICD-10-CM

## 2025-08-04 DIAGNOSIS — M54.16 LUMBAR RADICULOPATHY: ICD-10-CM

## 2025-08-04 DIAGNOSIS — G90.511 COMPLEX REGIONAL PAIN SYNDROME TYPE 1 OF RIGHT UPPER EXTREMITY: ICD-10-CM

## 2025-08-04 RX ORDER — MOMETASONE FUROATE 1 MG/ML
LOTION TOPICAL DAILY
Qty: 30 ML | Refills: 0 | Status: SHIPPED | OUTPATIENT
Start: 2025-08-04

## 2025-08-04 RX ORDER — GABAPENTIN 600 MG/1
TABLET ORAL
Qty: 180 TABLET | Refills: 0 | Status: SHIPPED | OUTPATIENT
Start: 2025-08-04

## 2025-08-19 ENCOUNTER — TELEPHONE (OUTPATIENT)
Age: 50
End: 2025-08-19

## 2025-08-19 ENCOUNTER — TELEPHONE (OUTPATIENT)
Dept: OBGYN CLINIC | Facility: HOSPITAL | Age: 50
End: 2025-08-19

## 2025-08-19 DIAGNOSIS — M16.11 PRIMARY OSTEOARTHRITIS OF RIGHT HIP: Primary | ICD-10-CM

## 2025-08-21 ENCOUNTER — PATIENT OUTREACH (OUTPATIENT)
Dept: OBGYN CLINIC | Facility: HOSPITAL | Age: 50
End: 2025-08-21

## 2025-08-21 LAB
DME PARACHUTE DELIVERY DATE ACTUAL: NORMAL
DME PARACHUTE DELIVERY DATE EXPECTED: NORMAL
DME PARACHUTE DELIVERY DATE REQUESTED: NORMAL
DME PARACHUTE ITEM DESCRIPTION: NORMAL
DME PARACHUTE ITEM DESCRIPTION: NORMAL
DME PARACHUTE ORDER STATUS: NORMAL
DME PARACHUTE SUPPLIER NAME: NORMAL
DME PARACHUTE SUPPLIER PHONE: NORMAL

## (undated) DEVICE — MEDTRONIC ACCESSORY KIT

## (undated) DEVICE — ADHESIVE SKN CLSR HISTOACRYL FLEX 0.5ML LF

## (undated) DEVICE — SKIN MARKER DUAL TIP WITH RULER CAP, FLEXIBLE RULER AND LABELS: Brand: DEVON

## (undated) DEVICE — ULTRACLEAN ACCESSORY ELECTRODE 1" (2.54 CM) COATED BLADE: Brand: ULTRACLEAN

## (undated) DEVICE — SYRINGE 10ML LL

## (undated) DEVICE — CHLORAPREP HI-LITE 26ML ORANGE

## (undated) DEVICE — INTENDED FOR TISSUE SEPARATION, AND OTHER PROCEDURES THAT REQUIRE A SHARP SURGICAL BLADE TO PUNCTURE OR CUT.: Brand: BARD-PARKER SAFETY BLADES SIZE 10, STERILE

## (undated) DEVICE — GLOVE INDICATOR PI UNDERGLOVE SZ 8 BLUE

## (undated) DEVICE — 3M™ IOBAN™ 2 ANTIMICROBIAL INCISE DRAPE 6650EZ: Brand: IOBAN™ 2

## (undated) DEVICE — GLOVE INDICATOR PI UNDERGLOVE SZ 7.5 BLUE

## (undated) DEVICE — DRAPE EQUIPMENT RF WAND

## (undated) DEVICE — GLOVE INDICATOR PI UNDERGLOVE SZ 6.5 BLUE

## (undated) DEVICE — SUT SILK 2-0 SH 30 IN K833H

## (undated) DEVICE — DRAPE C-ARM X-RAY

## (undated) DEVICE — 3M™ TEGADERM™ TRANSPARENT FILM DRESSING FRAME STYLE, 1626W, 4 IN X 4-3/4 IN (10 CM X 12 CM), 50/CT 4CT/CASE: Brand: 3M™ TEGADERM™

## (undated) DEVICE — DRAPE CAMERA/LASER

## (undated) DEVICE — BULB SYRINGE,IRRIGATION WITH PROTECTIVE CAP: Brand: DOVER

## (undated) DEVICE — ANTIBACTERIAL VIOLET BRAIDED (POLYGLACTIN 910), SYNTHETIC ABSORBABLE SUTURE: Brand: COATED VICRYL

## (undated) DEVICE — RECHARGER STIM RESTORE SENSOR SURESCAN MRI

## (undated) DEVICE — NEEDLE 25G X 1 1/2

## (undated) DEVICE — VIAL DECANTER

## (undated) DEVICE — SCD SEQUENTIAL COMPRESSION COMFORT SLEEVE MEDIUM KNEE LENGTH: Brand: KENDALL SCD

## (undated) DEVICE — TELFA NON-ADHERENT ABSORBENT DRESSING: Brand: TELFA

## (undated) DEVICE — REM POLYHESIVE ADULT PATIENT RETURN ELECTRODE: Brand: VALLEYLAB

## (undated) DEVICE — TOOL 14MH30 LEGEND 14CM 3MM: Brand: MIDAS REX ™

## (undated) DEVICE — BETADINE SCRUB BRUSH

## (undated) DEVICE — STRL UNIVERSAL MINOR GENERAL: Brand: CARDINAL HEALTH

## (undated) DEVICE — Device

## (undated) DEVICE — TIBURON SPLIT SHEET: Brand: CONVERTORS

## (undated) DEVICE — PENCIL ELECTROSURG E-Z CLEAN -0035H

## (undated) DEVICE — FLOSEAL HEMOSTATIC MATRIX, 5 ML: Brand: FLOSEAL

## (undated) DEVICE — NEURO PATTIES 1/2 X 1 1/2

## (undated) DEVICE — LIGHT HANDLE COVER SLEEVE DISP BLUE STELLAR

## (undated) DEVICE — ELECTRODE BLADE MOD E-Z CLEAN  2.75IN 7CM -0012AM

## (undated) DEVICE — TUBING SUCTION 5MM X 12 FT

## (undated) DEVICE — BUTTON SWITCH PENCIL HOLSTER: Brand: VALLEYLAB

## (undated) DEVICE — RECHARGER PRGRMR THERAPHY MANAGER RS2

## (undated) DEVICE — DRAPE C-ARMOUR

## (undated) DEVICE — PATIENT PROGRAMMER MYSTIM MRI MEDTRONIC

## (undated) DEVICE — GLOVE SRG LF STRL BGL SKNSNS 6.5 PF

## (undated) DEVICE — PROGRAMMER MULTII-PURPOSE PT THERAPY MANAGER RS2

## (undated) DEVICE — PREP SURGICAL PURPREP 26ML

## (undated) DEVICE — 2000CC GUARDIAN II: Brand: GUARDIAN

## (undated) DEVICE — PROXIMATE PLUS MD MULTI-DIRECTIONAL RELEASE SKIN STAPLERS CONTAINS 35 STAINLESS STEEL STAPLES APPROXIMATE CLOSED DIMENSIONS: 6.9MM X 3.9MM WIDE: Brand: PROXIMATE

## (undated) DEVICE — BETHLEHEM UNIVERSAL SPINE, KIT: Brand: CARDINAL HEALTH

## (undated) DEVICE — INTENDED FOR TISSUE SEPARATION, AND OTHER PROCEDURES THAT REQUIRE A SHARP SURGICAL BLADE TO PUNCTURE OR CUT.: Brand: BARD-PARKER ® CARBON RIB-BACK BLADES

## (undated) DEVICE — 3M™ STERI-DRAPE™ INCISE DRAPE 1040 (35CM X 35CM): Brand: STERI-DRAPE™

## (undated) DEVICE — GLOVE SRG BIOGEL 8

## (undated) DEVICE — PROXIMATE SKIN STAPLERS (35 WIDE) CONTAINS 35 STAINLESS STEEL STAPLES (FIXED HEAD): Brand: PROXIMATE

## (undated) DEVICE — GLOVE SRG LF STRL BGL SKNSNS 7 PF